# Patient Record
Sex: MALE | Race: WHITE | NOT HISPANIC OR LATINO | URBAN - METROPOLITAN AREA
[De-identification: names, ages, dates, MRNs, and addresses within clinical notes are randomized per-mention and may not be internally consistent; named-entity substitution may affect disease eponyms.]

---

## 2021-03-15 ENCOUNTER — INPATIENT (INPATIENT)
Facility: HOSPITAL | Age: 86
LOS: 3 days | Discharge: HOME | DRG: 177 | End: 2021-03-19
Attending: HOSPITALIST | Admitting: HOSPITALIST
Payer: MEDICARE

## 2021-03-15 VITALS
DIASTOLIC BLOOD PRESSURE: 78 MMHG | OXYGEN SATURATION: 95 % | SYSTOLIC BLOOD PRESSURE: 126 MMHG | WEIGHT: 184.97 LBS | RESPIRATION RATE: 16 BRPM | HEART RATE: 74 BPM | TEMPERATURE: 100 F

## 2021-03-15 DIAGNOSIS — I24.8 OTHER FORMS OF ACUTE ISCHEMIC HEART DISEASE: ICD-10-CM

## 2021-03-15 DIAGNOSIS — E11.9 TYPE 2 DIABETES MELLITUS WITHOUT COMPLICATIONS: ICD-10-CM

## 2021-03-15 DIAGNOSIS — Z29.9 ENCOUNTER FOR PROPHYLACTIC MEASURES, UNSPECIFIED: ICD-10-CM

## 2021-03-15 DIAGNOSIS — I10 ESSENTIAL (PRIMARY) HYPERTENSION: ICD-10-CM

## 2021-03-15 DIAGNOSIS — U07.1 COVID-19: ICD-10-CM

## 2021-03-15 DIAGNOSIS — R09.89 OTHER SPECIFIED SYMPTOMS AND SIGNS INVOLVING THE CIRCULATORY AND RESPIRATORY SYSTEMS: ICD-10-CM

## 2021-03-15 DIAGNOSIS — R77.8 OTHER SPECIFIED ABNORMALITIES OF PLASMA PROTEINS: ICD-10-CM

## 2021-03-15 LAB
ALBUMIN SERPL ELPH-MCNC: 3.1 G/DL — LOW (ref 3.5–5)
ALP SERPL-CCNC: 96 U/L — SIGNIFICANT CHANGE UP (ref 40–120)
ALT FLD-CCNC: 31 U/L DA — SIGNIFICANT CHANGE UP (ref 10–60)
ANION GAP SERPL CALC-SCNC: 6 MMOL/L — SIGNIFICANT CHANGE UP (ref 5–17)
APTT BLD: 28 SEC — SIGNIFICANT CHANGE UP (ref 27.5–35.5)
AST SERPL-CCNC: 26 U/L — SIGNIFICANT CHANGE UP (ref 10–40)
BASOPHILS # BLD AUTO: 0.01 K/UL — SIGNIFICANT CHANGE UP (ref 0–0.2)
BASOPHILS NFR BLD AUTO: 0.1 % — SIGNIFICANT CHANGE UP (ref 0–2)
BILIRUB SERPL-MCNC: 0.4 MG/DL — SIGNIFICANT CHANGE UP (ref 0.2–1.2)
BUN SERPL-MCNC: 27 MG/DL — HIGH (ref 7–18)
CALCIUM SERPL-MCNC: 8.4 MG/DL — SIGNIFICANT CHANGE UP (ref 8.4–10.5)
CHLORIDE SERPL-SCNC: 101 MMOL/L — SIGNIFICANT CHANGE UP (ref 96–108)
CK SERPL-CCNC: 104 U/L — SIGNIFICANT CHANGE UP (ref 35–232)
CO2 SERPL-SCNC: 31 MMOL/L — SIGNIFICANT CHANGE UP (ref 22–31)
CREAT SERPL-MCNC: 1.06 MG/DL — SIGNIFICANT CHANGE UP (ref 0.5–1.3)
D DIMER BLD IA.RAPID-MCNC: 1577 NG/ML DDU — HIGH
EOSINOPHIL # BLD AUTO: 0 K/UL — SIGNIFICANT CHANGE UP (ref 0–0.5)
EOSINOPHIL NFR BLD AUTO: 0 % — SIGNIFICANT CHANGE UP (ref 0–6)
FIBRINOGEN PPP-MCNC: 577 MG/DL — HIGH (ref 290–520)
GLUCOSE SERPL-MCNC: 127 MG/DL — HIGH (ref 70–99)
HCT VFR BLD CALC: 38.3 % — LOW (ref 39–50)
HGB BLD-MCNC: 12.8 G/DL — LOW (ref 13–17)
IMM GRANULOCYTES NFR BLD AUTO: 0.7 % — SIGNIFICANT CHANGE UP (ref 0–1.5)
INR BLD: 1.01 RATIO — SIGNIFICANT CHANGE UP (ref 0.88–1.16)
LACTATE SERPL-SCNC: 1.5 MMOL/L — SIGNIFICANT CHANGE UP (ref 0.7–2)
LDH SERPL L TO P-CCNC: 194 U/L — SIGNIFICANT CHANGE UP (ref 120–225)
LYMPHOCYTES # BLD AUTO: 0.93 K/UL — LOW (ref 1–3.3)
LYMPHOCYTES # BLD AUTO: 13.6 % — SIGNIFICANT CHANGE UP (ref 13–44)
MCHC RBC-ENTMCNC: 31.1 PG — SIGNIFICANT CHANGE UP (ref 27–34)
MCHC RBC-ENTMCNC: 33.4 GM/DL — SIGNIFICANT CHANGE UP (ref 32–36)
MCV RBC AUTO: 93 FL — SIGNIFICANT CHANGE UP (ref 80–100)
MONOCYTES # BLD AUTO: 0.98 K/UL — HIGH (ref 0–0.9)
MONOCYTES NFR BLD AUTO: 14.3 % — HIGH (ref 2–14)
NEUTROPHILS # BLD AUTO: 4.86 K/UL — SIGNIFICANT CHANGE UP (ref 1.8–7.4)
NEUTROPHILS NFR BLD AUTO: 71.3 % — SIGNIFICANT CHANGE UP (ref 43–77)
NRBC # BLD: 0 /100 WBCS — SIGNIFICANT CHANGE UP (ref 0–0)
NT-PROBNP SERPL-SCNC: 2445 PG/ML — HIGH (ref 0–450)
PLATELET # BLD AUTO: 174 K/UL — SIGNIFICANT CHANGE UP (ref 150–400)
POTASSIUM SERPL-MCNC: 3.6 MMOL/L — SIGNIFICANT CHANGE UP (ref 3.5–5.3)
POTASSIUM SERPL-SCNC: 3.6 MMOL/L — SIGNIFICANT CHANGE UP (ref 3.5–5.3)
PROT SERPL-MCNC: 6.8 G/DL — SIGNIFICANT CHANGE UP (ref 6–8.3)
PROTHROM AB SERPL-ACNC: 12 SEC — SIGNIFICANT CHANGE UP (ref 10.6–13.6)
RAPID RVP RESULT: DETECTED
RBC # BLD: 4.12 M/UL — LOW (ref 4.2–5.8)
RBC # FLD: 12.1 % — SIGNIFICANT CHANGE UP (ref 10.3–14.5)
SARS-COV-2 RNA SPEC QL NAA+PROBE: DETECTED
SODIUM SERPL-SCNC: 138 MMOL/L — SIGNIFICANT CHANGE UP (ref 135–145)
TROPONIN I SERPL-MCNC: 0.07 NG/ML — HIGH (ref 0–0.04)
WBC # BLD: 6.83 K/UL — SIGNIFICANT CHANGE UP (ref 3.8–10.5)
WBC # FLD AUTO: 6.83 K/UL — SIGNIFICANT CHANGE UP (ref 3.8–10.5)

## 2021-03-15 PROCEDURE — 99222 1ST HOSP IP/OBS MODERATE 55: CPT | Mod: CS

## 2021-03-15 PROCEDURE — 71275 CT ANGIOGRAPHY CHEST: CPT | Mod: 26

## 2021-03-15 PROCEDURE — 71045 X-RAY EXAM CHEST 1 VIEW: CPT | Mod: 26

## 2021-03-15 PROCEDURE — 99285 EMERGENCY DEPT VISIT HI MDM: CPT | Mod: CS

## 2021-03-15 RX ORDER — ALBUTEROL 90 UG/1
2 AEROSOL, METERED ORAL EVERY 4 HOURS
Refills: 0 | Status: DISCONTINUED | OUTPATIENT
Start: 2021-03-15 | End: 2021-03-17

## 2021-03-15 RX ORDER — ACETAMINOPHEN 500 MG
650 TABLET ORAL EVERY 4 HOURS
Refills: 0 | Status: DISCONTINUED | OUTPATIENT
Start: 2021-03-15 | End: 2021-03-19

## 2021-03-15 RX ORDER — GUAIFENESIN/DEXTROMETHORPHAN 600MG-30MG
10 TABLET, EXTENDED RELEASE 12 HR ORAL EVERY 4 HOURS
Refills: 0 | Status: DISCONTINUED | OUTPATIENT
Start: 2021-03-15 | End: 2021-03-18

## 2021-03-15 RX ORDER — ENOXAPARIN SODIUM 100 MG/ML
40 INJECTION SUBCUTANEOUS DAILY
Refills: 0 | Status: DISCONTINUED | OUTPATIENT
Start: 2021-03-15 | End: 2021-03-19

## 2021-03-15 RX ORDER — REMDESIVIR 5 MG/ML
200 INJECTION INTRAVENOUS EVERY 24 HOURS
Refills: 0 | Status: COMPLETED | OUTPATIENT
Start: 2021-03-15 | End: 2021-03-16

## 2021-03-15 RX ORDER — INSULIN LISPRO 100/ML
VIAL (ML) SUBCUTANEOUS
Refills: 0 | Status: DISCONTINUED | OUTPATIENT
Start: 2021-03-15 | End: 2021-03-19

## 2021-03-15 RX ORDER — ASPIRIN/CALCIUM CARB/MAGNESIUM 324 MG
325 TABLET ORAL ONCE
Refills: 0 | Status: COMPLETED | OUTPATIENT
Start: 2021-03-15 | End: 2021-03-15

## 2021-03-15 RX ORDER — SODIUM CHLORIDE 9 MG/ML
1000 INJECTION INTRAMUSCULAR; INTRAVENOUS; SUBCUTANEOUS ONCE
Refills: 0 | Status: COMPLETED | OUTPATIENT
Start: 2021-03-15 | End: 2021-03-15

## 2021-03-15 RX ORDER — REMDESIVIR 5 MG/ML
INJECTION INTRAVENOUS
Refills: 0 | Status: DISCONTINUED | OUTPATIENT
Start: 2021-03-15 | End: 2021-03-19

## 2021-03-15 RX ORDER — ASPIRIN/CALCIUM CARB/MAGNESIUM 324 MG
81 TABLET ORAL DAILY
Refills: 0 | Status: DISCONTINUED | OUTPATIENT
Start: 2021-03-15 | End: 2021-03-19

## 2021-03-15 RX ORDER — DEXAMETHASONE 0.5 MG/5ML
6 ELIXIR ORAL DAILY
Refills: 0 | Status: DISCONTINUED | OUTPATIENT
Start: 2021-03-15 | End: 2021-03-19

## 2021-03-15 RX ADMIN — SODIUM CHLORIDE 1000 MILLILITER(S): 9 INJECTION INTRAMUSCULAR; INTRAVENOUS; SUBCUTANEOUS at 16:28

## 2021-03-15 RX ADMIN — Medication 325 MILLIGRAM(S): at 17:57

## 2021-03-15 NOTE — H&P ADULT - ASSESSMENT
86 year old male with a PMH of HTN, DM who presented with a chief complaint of fever, cough and sob. Patient is admitted for AHRF 2/2 COVID pna     Primary team to confirm home medications

## 2021-03-15 NOTE — H&P ADULT - HISTORY OF PRESENT ILLNESS
86 year old male with a PMH of HTN, DM who presented with a chief complaint of fever, cough and sob. pt states his sxs started few days ago when he started having fevers, cough productive of whitish-colored sputum associated with shortness of breath. He endorses his sxs have progressively gotten worse. Denies nausea, vomiting, diarrhea, abdominal pain, chest pain, palpitations, dizziness, headache, wheezing, joint pain or swelling

## 2021-03-15 NOTE — H&P ADULT - ATTENDING COMMENTS
Patient is an 86 year old male with a PMH of HTN, DM who presented with a chief complaint of fever.  (Spanish  ID: [id number])  Patient states that beginning several days prior to current presentation he began to experience intermittent fevers as well as a progressively worsening cough productive of whitish-colored sputum associated with shortness of breath, particularly with exertion.    At time of examination in the ED, patient denies any headache, dizziness, chest pain, palpitations, abdominal pain, nausea/vomiting/diarrhea.    T(C): 37.7 (03-15-21 @ 14:04), Max: 37.7 (03-15-21 @ 14:04)  T(F): 99.8 (03-15-21 @ 14:04), Max: 99.8 (03-15-21 @ 14:04)  HR: 64 (03-15-21 @ 17:12) (64 - 74)  BP: 106/53 (03-15-21 @ 17:12) (106/53 - 126/78)  RR: 19 (03-15-21 @ 17:12) (16 - 19)  SpO2: 93% (03-15-21 @ 17:12) (93% - 95%)  Wt(kg): --    P/E: As above MAR    A/P:    Cough, Fever and Shortness of Breath most probably due to Severe COVID Infection:  -COVID testing still pending in ED  -SIRS Criteria=  0 + possible source of infection (?Lungs)  -At time of examination in the ED, patient is requiring supplemental oxygen.  Therefore, should COVID testing return as detected, patient can be categorized as Severe Disease.  -Therefore, will start patient on Lovenox  -In addition, will start patient on Dexamethasone 6mg IV Daily for 10 days or until discharge (whichever is shorter)  -Will also start patient on Albuterol MDI Q2H PRN  -Will send ESR, CRP, Procalcitonin  -Will continue to trend d-dimer, CRP, LDH, Troponin, Ferritin, CPK  -Tylenol PRN for fever  -Robitussin for Cough  -Will continue to provide patient with any and all additional supportive care as necessary  -Will ask Infectious Disease to evaluate patient for further recommendations, such as Remdesivir    Troponinemia likely due to Type 2 MI:  -Troponin= 0.066 (No baseline available)  -Will admit patient to Telemetry for continuous cardiac monitoring  -Will continue to trend troponin with simultaneous acquisition of EKG  -TTE    Elevated Pro-BNP:  -As above  -Pro-BNP= 2,445 (No baseline available)    Normocytic Anemia:  -Hgb= 12.8  -MCV= 93.0, RDW= 12.1  -Will send Retic Count, Thiamine, Folate, B12, Iron Panel (Iron, TIBC, Ferritin)    HTN:  -Will resume patient's home medication  -Vital Signs Q4H    DM:  -Hemoglobin A1c with AM labs  -Blood Glucose Monitoring ACHS  -Regular Insulin Sliding Scale ACHS  -Carb Controlled, Heart Healthy diet as tolerated    Hypoalbuminemia:  -Nutrition Consult    GI/DVT PPx:  -Pepcid  -Lovenox Patient is an 86 year old male with a PMH of HTN, DM who presented with a chief complaint of fever.  (Tanzanian  ID: [id number])  Patient states that beginning several days prior to current presentation he began to experience intermittent fevers as well as a progressively worsening cough productive of whitish-colored sputum associated with shortness of breath, particularly with exertion.    At time of examination in the ED, patient denies any headache, dizziness, chest pain, palpitations, abdominal pain, nausea/vomiting/diarrhea.    T(C): 37.7 (03-15-21 @ 14:04), Max: 37.7 (03-15-21 @ 14:04)  T(F): 99.8 (03-15-21 @ 14:04), Max: 99.8 (03-15-21 @ 14:04)  HR: 64 (03-15-21 @ 17:12) (64 - 74)  BP: 106/53 (03-15-21 @ 17:12) (106/53 - 126/78)  RR: 19 (03-15-21 @ 17:12) (16 - 19)  SpO2: 93% (03-15-21 @ 17:12) (93% - 95%)  Wt(kg): --    P/E: As above MAR    A/P:    Cough, Fever and Shortness of Breath most probably due to Severe COVID Infection:  -COVID testing still pending in ED  -SIRS Criteria=  0 + possible source of infection (?Lungs)  -At time of examination in the ED, patient is requiring supplemental oxygen.  Therefore, should COVID testing return as detected, patient can be categorized as Severe Disease.  -Therefore, will start patient on Lovenox  -In addition, will start patient on Dexamethasone 6mg IV Daily for 10 days or until discharge (whichever is shorter)  -Will also start patient on Albuterol MDI Q2H PRN  -Will send ESR, CRP, Procalcitonin  -Will continue to trend d-dimer, CRP, LDH, Troponin, Ferritin, CPK  -Tylenol PRN for fever  -Robitussin for Cough  -Will continue to provide patient with any and all additional supportive care as necessary  -Will ask Infectious Disease to evaluate patient for further recommendations, such as Remdesivir    Troponinemia likely due to Type 2 MI:  -Troponin= 0.066 (No baseline available)  -Will admit patient to Telemetry for continuous cardiac monitoring  -Will continue to trend troponin with simultaneous acquisition of EKG  -TTE    Elevated Pro-BNP:  -As above  -Pro-BNP= 2,445 (No baseline available)    Normocytic Anemia:  -Hgb= 12.8  -MCV= 93.0, RDW= 12.1  -Will send Retic Count, Thiamine, Folate, B12, Iron Panel (Iron, TIBC, Ferritin)    HTN:  -Will resume patient's home medication  -Vital Signs Q4H    DM:  -Hemoglobin A1c with AM labs  -Blood Glucose Monitoring ACHS  -Regular Insulin Sliding Scale ACHS  -Carb Controlled, Heart Healthy, Low Sodium diet as tolerated    Hypoalbuminemia:  -Nutrition Consult    GI/DVT PPx:  -Pepcid  -Lovenox Patient is an 86 year old male with a PMH of HTN, DM who presented with a chief complaint of fever.  (Gibraltarian  ID: [id number])  Patient states that beginning several days prior to current presentation he began to experience intermittent fevers as well as a progressively worsening cough productive of whitish-colored sputum associated with shortness of breath, particularly with exertion.    At time of examination in the ED, patient denies any headache, dizziness, chest pain, palpitations, abdominal pain, nausea/vomiting/diarrhea.    T(C): 37.7 (03-15-21 @ 14:04), Max: 37.7 (03-15-21 @ 14:04)  T(F): 99.8 (03-15-21 @ 14:04), Max: 99.8 (03-15-21 @ 14:04)  HR: 64 (03-15-21 @ 17:12) (64 - 74)  BP: 106/53 (03-15-21 @ 17:12) (106/53 - 126/78)  RR: 19 (03-15-21 @ 17:12) (16 - 19)  SpO2: 93% (03-15-21 @ 17:12) (93% - 95%)  Wt(kg): --    P/E: As above MAR    A/P:    Cough, Fever and Shortness of Breath most probably due to Severe COVID Infection vs Community Acquired Pneumonia (CAP):  -COVID testing still pending in ED  -SIRS Criteria=  0 + possible source of infection (?Lungs)  -Chest film, though portable, appears to demonstrate a possible small consolidated density in the left lower lung field potentially suggestive of a bacterial pneumonia  -At time of examination in the ED, patient is requiring supplemental oxygen.  Therefore, should COVID testing return as detected, patient can be categorized as Severe Disease.  -Therefore, will start patient on Lovenox  -In addition, will start patient on Dexamethasone 6mg IV Daily for 10 days or until discharge (whichever is shorter)  -Will also start patient on Albuterol MDI Q2H PRN  -Will send ESR, CRP, Procalcitonin STAT  -Will continue to trend d-dimer, CRP, LDH, Troponin, Ferritin, CPK  -Tylenol PRN for fever  -Robitussin for Cough  -Will empirically start patient on Rocephin and Azithro, for now  -Will continue to provide patient with any and all additional supportive care as necessary  -Will ask Infectious Disease to evaluate patient for further recommendations, such as Remdesivir    Troponinemia likely due to Type 2 MI:  -Troponin= 0.066 (No baseline available)  -Will admit patient to Telemetry for continuous cardiac monitoring  -Will continue to trend troponin with simultaneous acquisition of EKG  -TTE    Elevated Pro-BNP:  -As above  -Pro-BNP= 2,445 (No baseline available)    Normocytic Anemia:  -Hgb= 12.8  -MCV= 93.0, RDW= 12.1  -Will send Retic Count, Thiamine, Folate, B12, Iron Panel (Iron, TIBC, Ferritin)    HTN:  -Will resume patient's home medication  -Vital Signs Q4H    DM:  -Hemoglobin A1c with AM labs  -Blood Glucose Monitoring ACHS  -Regular Insulin Sliding Scale ACHS  -Carb Controlled, Heart Healthy, Low Sodium diet as tolerated    Hypoalbuminemia:  -Nutrition Consult    GI/DVT PPx:  -Pepcid  -Lovenox Patient is an 86 year old male with a PMH of HTN, DM who presented with a chief complaint of fever.  (Japanese  ID: [id number])  Patient states that beginning several days prior to current presentation he began to experience intermittent fevers as well as a progressively worsening cough productive of whitish-colored sputum associated with shortness of breath, particularly with exertion.    At time of examination in the ED, patient denies any headache, dizziness, chest pain, palpitations, abdominal pain, nausea/vomiting/diarrhea.    T(C): 37.7 (03-15-21 @ 14:04), Max: 37.7 (03-15-21 @ 14:04)  T(F): 99.8 (03-15-21 @ 14:04), Max: 99.8 (03-15-21 @ 14:04)  HR: 64 (03-15-21 @ 17:12) (64 - 74)  BP: 106/53 (03-15-21 @ 17:12) (106/53 - 126/78)  RR: 19 (03-15-21 @ 17:12) (16 - 19)  SpO2: 93% (03-15-21 @ 17:12) (93% - 95%)  Wt(kg): --    P/E: As above MAR    A/P:    Cough, Fever and Shortness of Breath most probably due to Severe COVID Infection:  -COVID testing still pending in ED  -SIRS Criteria=  0 + possible source of infection (?Lungs)  -Chest film, though portable, appears to demonstrate a possible small consolidated density in the left lower lung field  -At time of examination in the ED, patient is requiring supplemental oxygen.  Therefore, should COVID testing return as detected, patient can be categorized as Severe Disease.  -Therefore, will start patient on Lovenox  -In addition, will start patient on Dexamethasone 6mg IV Daily for 10 days or until discharge (whichever is shorter)  -Will also start patient on Albuterol MDI Q2H PRN  -Will send ESR, CRP, Procalcitonin STAT  -Will continue to trend d-dimer, CRP, LDH, Troponin, Ferritin, CPK  -Tylenol PRN for fever  -Robitussin for Cough  -Will continue to provide patient with any and all additional supportive care as necessary  -Will ask Infectious Disease to evaluate patient for further recommendations, such as Remdesivir    Troponinemia likely due to Type 2 MI:  -Troponin= 0.066 (No baseline available)  -Will admit patient to Telemetry for continuous cardiac monitoring  -Will continue to trend troponin with simultaneous acquisition of EKG  -TTE    Elevated Pro-BNP:  -As above  -Pro-BNP= 2,445 (No baseline available)    Normocytic Anemia:  -Hgb= 12.8  -MCV= 93.0, RDW= 12.1  -Will send Retic Count, Thiamine, Folate, B12, Iron Panel (Iron, TIBC, Ferritin)    HTN:  -Will resume patient's home medication  -Vital Signs Q4H    DM:  -Hemoglobin A1c with AM labs  -Blood Glucose Monitoring ACHS  -Regular Insulin Sliding Scale ACHS  -Carb Controlled, Heart Healthy, Low Sodium diet as tolerated    Hypoalbuminemia:  -Nutrition Consult    GI/DVT PPx:  -Pepcid  -Lovenox

## 2021-03-15 NOTE — ED PROVIDER NOTE - CLINICAL SUMMARY MEDICAL DECISION MAKING FREE TEXT BOX
No e/o fluid overload. No e/o DVT and no CP/SOB to suggest PE. No e/o fluid overload. No e/o DVT and no CP/SOB to suggest PE. Noted elevated trop and will admit. No focal PNA noted and concern for COVID and will hold off on abx at this time. Patient well appearing, hemodynamically stable, satting well on RA. Given ASA. D/w his girlfriend. Admitted to internal medicine for further monitoring, w/u, and care. No e/o fluid overload. No e/o DVT and no CP/SOB to suggest PE - noted elevated trop/BNP and will send for CTA, low suspicion for this and MAR Dr. Dobbs will f/u results. Noted elevated trop and will admit. No focal PNA noted and concern for COVID and will hold off on abx at this time. Patient well appearing, hemodynamically stable, satting well on RA. Given ASA. Satting well on 2L NC. D/w his girlfriend. Admitted to internal medicine for further monitoring, w/u, and care. No e/o fluid overload. No e/o DVT and no CP/SOB to suggest PE and CTA negative. Noted elevated trop and will admit. No focal PNA noted and concern for COVID and will hold off on abx at this time - COVID confirmed. Patient well appearing, hemodynamically stable, satting well on 2L NC. Given ASA. Satting well on 2L NC. D/w his girlfriend. Admitted to internal medicine for further monitoring, w/u, and care.

## 2021-03-15 NOTE — H&P ADULT - PROBLEM SELECTOR PLAN 2
-Pt noted to have troponin of .06  -EKG no acute changes  -d dimer >1500   -CTA negative for PE   -Will trend troponins   -will monitor on tele and order echo for now

## 2021-03-15 NOTE — H&P ADULT - PROBLEM SELECTOR PLAN 3
-Patient takes at home: Metformin  -Hold oral hypoglycemics  -HSS  -Accucheck: Before meals and at bedtime

## 2021-03-15 NOTE — ED ADULT NURSE NOTE - NSIMPLEMENTINTERV_GEN_ALL_ED
Implemented All Fall with Harm Risk Interventions:  [place] to call system. Call bell, personal items and telephone within reach. Instruct patient to call for assistance. Room bathroom lighting operational. Non-slip footwear when patient is off stretcher. Physically safe environment: no spills, clutter or unnecessary equipment. Stretcher in lowest position, wheels locked, appropriate side rails in place. Provide visual cue, wrist band, yellow gown, etc. Monitor gait and stability. Monitor for mental status changes and reorient to person, place, and time. Review medications for side effects contributing to fall risk. Reinforce activity limits and safety measures with patient and family. Provide visual clues: red socks.

## 2021-03-15 NOTE — H&P ADULT - NSHPPHYSICALEXAM_GEN_ALL_CORE
Vital Signs Last 24 Hrs  T(C): 37.7 (15 Mar 2021 14:04), Max: 37.7 (15 Mar 2021 14:04)  T(F): 99.8 (15 Mar 2021 14:04), Max: 99.8 (15 Mar 2021 14:04)  HR: 64 (15 Mar 2021 17:12) (64 - 74)  BP: 106/53 (15 Mar 2021 17:12) (106/53 - 126/78)  BP(mean): --  RR: 19 (15 Mar 2021 17:12) (16 - 19)  SpO2: 93% (15 Mar 2021 17:12) (93% - 95%)    GENERAL: NAD, on 2l nc  HEAD:  Atraumatic, Normocephalic  EYES: EOMI, PERRLA, conjunctiva and sclera clear  ENT: Moist mucous membranes  NECK: Supple, No JVD  CHEST/LUNG: RLL mild crackles   HEART: Regular rate and rhythm; S1+ S2+  ABDOMEN: Bowel sounds present; Soft, Nontender, Nondistended. No hepatomegaly  EXTREMITIES:  2+ Peripheral Pulses, brisk capillary refill. No clubbing, cyanosis, 2+ edema  NERVOUS SYSTEM:  Alert & Oriented , speech clear. No deficits   MSK: FROM all 4 extremities, full and equal strength  SKIN: No rashes or lesions

## 2021-03-15 NOTE — ED PROVIDER NOTE - PHYSICAL EXAMINATION
Afebrile, hemodynamically stable, saturating well on RA  NAD, well appearing, sitting and walking comfortably in bed, no WOB, speaking full sentences  Head NCAT  EOMI grossly, anicteric  MMM  No JVD  RRR, nml S1/S2, no m/r/g  Lungs CTAB, no w/r/r  Abd soft, NT, ND, nml BS, no rebound or guarding  AAO, CN's 3-12 grossly intact  JO spontaneously, no leg cyanosis or edema  Skin warm, well perfused, no rashes or hives

## 2021-03-15 NOTE — H&P ADULT - NSHPLABSRESULTS_GEN_ALL_CORE
< from: CT Angio Chest w/ IV Cont (03.15.21 @ 22:27) >      EXAM:  CT ANGIO CHEST (W)AW IC                            PROCEDURE DATE:  03/15/2021          INTERPRETATION:  CLINICAL INFORMATION: Cough, shortness of breath, concern for PE, history of heart disease    COMPARISON: None.    CONTRAST/COMPLICATIONS:  IV Contrast: Omnipaque 350 / 47 cc administered / 53 cc discarded.  Oral Contrast: NONE  Complications: None reported at time of study completion    PROCEDURE:  CT Angiography of the Chest.  Sagittal and coronal reformats were performed as well as3D (MIP) reconstructions.    FINDINGS:    LUNGS AND AIRWAYS: Patent central airways.  Streaky bilateral subsegmental atelectasis, worst in the lung bases. No focal lung consolidation.  PLEURA: No pleural effusion.  MEDIASTINUM AND SALLY: No lymphadenopathy.  VESSELS: No evidence of pulmonary embolism, though multiple segmental pulmonary arteries, particularly at the lung bases, are not well evaluated due to motion artifact.  HEART: Heart size is normal. No pericardial effusion. Coronary artery calcifications.  CHEST WALL AND LOWER NECK: Within normal limits.  VISUALIZED UPPER ABDOMEN: Within normal limits.  BONES: Degenerative changes.    IMPRESSION:  No CT evidence of pulmonary embolism, though multiple segmental pulmonary arteries, particularly at the lung bases, are not well evaluated due to motion artifact.    No pneumonia.            MARCEL WOO MD; Attending Radiologist  This document has been electronically signed. Mar 15 2021 10:51PM    < end of copied text >    < from: Xray Chest 1 View-PORTABLE IMMEDIATE (03.15.21 @ 16:33) >    IMPRESSION:  Heart magnified by AP film shallow inspiration. Unfolding calcification thoracic aorta. Bibasilar fibrotic/atelectatic stranding. No focal consolidation. Trace right pleural effusion. No acute infiltrate or pleural effusion.          < end of copied text >

## 2021-03-15 NOTE — H&P ADULT - PROBLEM SELECTOR PLAN 5
IMPROVE VTE Individual Risk Assessment  RISK                                                         Points  [  ] Previous VTE                                      3  [  ] Thrombophilia                                   2  [  ] Lower limb paralysis                         2 (unable to hold up >15 seconds)    [  ] Current Cancer                                  2       (within 6 months)  [  ] Immobilization > 24 hrs                    1  [  ] ICU/CCU stay > 24 hrs                         1  [  ] Age > 60                                              1  lovenox for dvt ppx

## 2021-03-15 NOTE — ED PROVIDER NOTE - OBJECTIVE STATEMENT
86yoM with h/o HTN, DM, presents with mucous productive cough x2-3 wks. Is unsure if he has fever. States lives alone and cares for himself and is eating normally and taking care of himself appropriately. No recent COVID swab, unknown if exposed. Denies CP, SOB, leg pain or swelling, abd pain, v/d, dysuria, rashes, and all other symptoms.

## 2021-03-15 NOTE — H&P ADULT - PROBLEM SELECTOR PLAN 1
Cough, Fever and Shortness of Breath most probably due to Severe COVID Infection vs Community Acquired Pneumonia (CAP):  -COVID +  -SIRS Criteria=  0 + possible source of infection ( Lungs)  -Chest No CT evidence of pulmonary embolism, though multiple segmental pulmonary arteries, particularly at the lung bases, are not well evaluated due to motion artifact.  -CXR Heart magnified by AP film shallow inspiration. Unfolding calcification thoracic aorta. Bibasilar fibrotic/atelectatic stranding. No focal consolidation. Trace right pleural effusion. No acute infiltrate or pleural effusion.  -will start patient on Dexamethasone 6mg IV Daily  -Will also start patient on Albuterol MDI PRN  -Will send ESR, CRP, Procalcitonin  -Will continue to trend d-dimer, CRP, LDH, Troponin, Ferritin, CPK  -Tylenol PRN for fever, Robitussin for Cough  -Will start on Remdesivir, dc if antibodies are positive

## 2021-03-15 NOTE — ED PROVIDER NOTE - CARE PLAN
Principal Discharge DX:	Demand ischemia of myocardium  Secondary Diagnosis:	Cough   Principal Discharge DX:	Demand ischemia of myocardium  Secondary Diagnosis:	Cough  Secondary Diagnosis:	COVID-19 virus infection

## 2021-03-16 DIAGNOSIS — I24.8 OTHER FORMS OF ACUTE ISCHEMIC HEART DISEASE: ICD-10-CM

## 2021-03-16 LAB
A1C WITH ESTIMATED AVERAGE GLUCOSE RESULT: 6.7 % — HIGH (ref 4–5.6)
ANION GAP SERPL CALC-SCNC: 7 MMOL/L — SIGNIFICANT CHANGE UP (ref 5–17)
APTT BLD: 29.1 SEC — SIGNIFICANT CHANGE UP (ref 27.5–35.5)
BUN SERPL-MCNC: 25 MG/DL — HIGH (ref 7–18)
CALCIUM SERPL-MCNC: 8.1 MG/DL — LOW (ref 8.4–10.5)
CHLORIDE SERPL-SCNC: 97 MMOL/L — SIGNIFICANT CHANGE UP (ref 96–108)
CHOLEST SERPL-MCNC: 156 MG/DL — SIGNIFICANT CHANGE UP
CK MB BLD-MCNC: 1.3 % — SIGNIFICANT CHANGE UP (ref 0–3.5)
CK MB CFR SERPL CALC: 1.4 NG/ML — SIGNIFICANT CHANGE UP (ref 0–3.6)
CK SERPL-CCNC: 110 U/L — SIGNIFICANT CHANGE UP (ref 35–232)
CK SERPL-CCNC: 114 U/L — SIGNIFICANT CHANGE UP (ref 35–232)
CO2 SERPL-SCNC: 33 MMOL/L — HIGH (ref 22–31)
CREAT SERPL-MCNC: 0.85 MG/DL — SIGNIFICANT CHANGE UP (ref 0.5–1.3)
CRP SERPL-MCNC: 59 MG/L — HIGH
CRP SERPL-MCNC: 90 MG/L — HIGH
D DIMER BLD IA.RAPID-MCNC: 971 NG/ML DDU — HIGH
ERYTHROCYTE [SEDIMENTATION RATE] IN BLOOD: 18 MM/HR — SIGNIFICANT CHANGE UP (ref 0–20)
ESTIMATED AVERAGE GLUCOSE: 146 MG/DL — HIGH (ref 68–114)
FERRITIN SERPL-MCNC: 208 NG/ML — SIGNIFICANT CHANGE UP (ref 30–400)
FERRITIN SERPL-MCNC: 274 NG/ML — SIGNIFICANT CHANGE UP (ref 30–400)
FOLATE SERPL-MCNC: 14.2 NG/ML — SIGNIFICANT CHANGE UP
GLUCOSE BLDC GLUCOMTR-MCNC: 189 MG/DL — HIGH (ref 70–99)
GLUCOSE BLDC GLUCOMTR-MCNC: 192 MG/DL — HIGH (ref 70–99)
GLUCOSE BLDC GLUCOMTR-MCNC: 204 MG/DL — HIGH (ref 70–99)
GLUCOSE BLDC GLUCOMTR-MCNC: 204 MG/DL — HIGH (ref 70–99)
GLUCOSE BLDC GLUCOMTR-MCNC: 218 MG/DL — HIGH (ref 70–99)
GLUCOSE SERPL-MCNC: 157 MG/DL — HIGH (ref 70–99)
HCT VFR BLD CALC: 36.4 % — LOW (ref 39–50)
HDLC SERPL-MCNC: 53 MG/DL — SIGNIFICANT CHANGE UP
HGB BLD-MCNC: 12.1 G/DL — LOW (ref 13–17)
INR BLD: 1.01 RATIO — SIGNIFICANT CHANGE UP (ref 0.88–1.16)
IRON SATN MFR SERPL: 14 UG/DL — LOW (ref 65–170)
IRON SATN MFR SERPL: 6 % — LOW (ref 20–55)
LDH SERPL L TO P-CCNC: 176 U/L — SIGNIFICANT CHANGE UP (ref 120–225)
LIPID PNL WITH DIRECT LDL SERPL: 83 MG/DL — SIGNIFICANT CHANGE UP
MAGNESIUM SERPL-MCNC: 2 MG/DL — SIGNIFICANT CHANGE UP (ref 1.6–2.6)
MCHC RBC-ENTMCNC: 31.3 PG — SIGNIFICANT CHANGE UP (ref 27–34)
MCHC RBC-ENTMCNC: 33.2 GM/DL — SIGNIFICANT CHANGE UP (ref 32–36)
MCV RBC AUTO: 94.1 FL — SIGNIFICANT CHANGE UP (ref 80–100)
NON HDL CHOLESTEROL: 103 MG/DL — SIGNIFICANT CHANGE UP
NRBC # BLD: 0 /100 WBCS — SIGNIFICANT CHANGE UP (ref 0–0)
NT-PROBNP SERPL-SCNC: 746 PG/ML — HIGH (ref 0–450)
PHOSPHATE SERPL-MCNC: 3 MG/DL — SIGNIFICANT CHANGE UP (ref 2.5–4.5)
PLATELET # BLD AUTO: 150 K/UL — SIGNIFICANT CHANGE UP (ref 150–400)
POTASSIUM SERPL-MCNC: 3.4 MMOL/L — LOW (ref 3.5–5.3)
POTASSIUM SERPL-SCNC: 3.4 MMOL/L — LOW (ref 3.5–5.3)
PROCALCITONIN SERPL-MCNC: 0.16 NG/ML — HIGH (ref 0.02–0.1)
PROCALCITONIN SERPL-MCNC: 0.23 NG/ML — HIGH (ref 0.02–0.1)
PROTHROM AB SERPL-ACNC: 12 SEC — SIGNIFICANT CHANGE UP (ref 10.6–13.6)
RBC # BLD: 3.87 M/UL — LOW (ref 4.2–5.8)
RBC # FLD: 12.4 % — SIGNIFICANT CHANGE UP (ref 10.3–14.5)
SARS-COV-2 IGG SERPL QL IA: NEGATIVE — SIGNIFICANT CHANGE UP
SARS-COV-2 IGM SERPL IA-ACNC: 0.11 INDEX — SIGNIFICANT CHANGE UP
SODIUM SERPL-SCNC: 137 MMOL/L — SIGNIFICANT CHANGE UP (ref 135–145)
TIBC SERPL-MCNC: 226 UG/DL — LOW (ref 250–450)
TRIGL SERPL-MCNC: 100 MG/DL — SIGNIFICANT CHANGE UP
TROPONIN I SERPL-MCNC: 0.05 NG/ML — HIGH (ref 0–0.04)
TROPONIN I SERPL-MCNC: 0.06 NG/ML — HIGH (ref 0–0.04)
TROPONIN I SERPL-MCNC: 0.06 NG/ML — HIGH (ref 0–0.04)
TSH SERPL-MCNC: 1.1 UU/ML — SIGNIFICANT CHANGE UP (ref 0.34–4.82)
UIBC SERPL-MCNC: 212 UG/DL — SIGNIFICANT CHANGE UP (ref 110–370)
VIT B12 SERPL-MCNC: 1527 PG/ML — HIGH (ref 232–1245)
WBC # BLD: 5.88 K/UL — SIGNIFICANT CHANGE UP (ref 3.8–10.5)
WBC # FLD AUTO: 5.88 K/UL — SIGNIFICANT CHANGE UP (ref 3.8–10.5)

## 2021-03-16 PROCEDURE — 99233 SBSQ HOSP IP/OBS HIGH 50: CPT | Mod: CS,GC

## 2021-03-16 RX ORDER — FERROUS SULFATE 325(65) MG
325 TABLET ORAL DAILY
Refills: 0 | Status: DISCONTINUED | OUTPATIENT
Start: 2021-03-16 | End: 2021-03-19

## 2021-03-16 RX ORDER — PANTOPRAZOLE SODIUM 20 MG/1
40 TABLET, DELAYED RELEASE ORAL
Refills: 0 | Status: DISCONTINUED | OUTPATIENT
Start: 2021-03-16 | End: 2021-03-19

## 2021-03-16 RX ORDER — SIMVASTATIN 20 MG/1
20 TABLET, FILM COATED ORAL AT BEDTIME
Refills: 0 | Status: DISCONTINUED | OUTPATIENT
Start: 2021-03-16 | End: 2021-03-19

## 2021-03-16 RX ORDER — METOPROLOL TARTRATE 50 MG
12.5 TABLET ORAL
Refills: 0 | Status: DISCONTINUED | OUTPATIENT
Start: 2021-03-16 | End: 2021-03-17

## 2021-03-16 RX ORDER — REMDESIVIR 5 MG/ML
100 INJECTION INTRAVENOUS EVERY 24 HOURS
Refills: 0 | Status: DISCONTINUED | OUTPATIENT
Start: 2021-03-17 | End: 2021-03-19

## 2021-03-16 RX ORDER — MIRTAZAPINE 45 MG/1
15 TABLET, ORALLY DISINTEGRATING ORAL AT BEDTIME
Refills: 0 | Status: DISCONTINUED | OUTPATIENT
Start: 2021-03-16 | End: 2021-03-19

## 2021-03-16 RX ORDER — POTASSIUM CHLORIDE 20 MEQ
20 PACKET (EA) ORAL ONCE
Refills: 0 | Status: COMPLETED | OUTPATIENT
Start: 2021-03-16 | End: 2021-03-16

## 2021-03-16 RX ADMIN — Medication 2: at 16:59

## 2021-03-16 RX ADMIN — SIMVASTATIN 20 MILLIGRAM(S): 20 TABLET, FILM COATED ORAL at 21:09

## 2021-03-16 RX ADMIN — Medication 1: at 21:09

## 2021-03-16 RX ADMIN — ENOXAPARIN SODIUM 40 MILLIGRAM(S): 100 INJECTION SUBCUTANEOUS at 11:54

## 2021-03-16 RX ADMIN — Medication 2: at 08:13

## 2021-03-16 RX ADMIN — Medication 20 MILLIEQUIVALENT(S): at 09:57

## 2021-03-16 RX ADMIN — MIRTAZAPINE 15 MILLIGRAM(S): 45 TABLET, ORALLY DISINTEGRATING ORAL at 21:09

## 2021-03-16 RX ADMIN — Medication 81 MILLIGRAM(S): at 11:55

## 2021-03-16 RX ADMIN — Medication 12.5 MILLIGRAM(S): at 17:19

## 2021-03-16 RX ADMIN — REMDESIVIR 500 MILLIGRAM(S): 5 INJECTION INTRAVENOUS at 02:57

## 2021-03-16 RX ADMIN — Medication 6 MILLIGRAM(S): at 02:57

## 2021-03-16 RX ADMIN — Medication 1: at 13:10

## 2021-03-16 NOTE — PATIENT PROFILE ADULT - STATED REASON FOR ADMISSION
"He has been coughing for a year so I didn't think it was Covid He has fever and cough for 3 days now".

## 2021-03-16 NOTE — PROGRESS NOTE ADULT - PROBLEM SELECTOR PLAN 1
c/w Remdesivir, Dexamethasone , Day 2  Continue with albuterol, antitussive  Supplemental oxygen to keep SpO2 92% and above. Wean as tolerated.  Monitor oxygenation  Airborne/contact isolation

## 2021-03-16 NOTE — PROGRESS NOTE ADULT - SUBJECTIVE AND OBJECTIVE BOX
NP Note discussed with  Primary Attending    Patient is a 86y old  Male who presents with a chief complaint of cough fever (15 Mar 2021 22:07)    HPI    86 year old male with a PMH of HTN, DM who presented with a chief complaint of fever, cough and sob. Pt states his symptoms started few days ago when he started having fevers, cough productive of whitish-colored sputum associated with shortness of breath. He endorses his symptoms  have progressively gotten worse. Denies nausea, vomiting, diarrhea, abdominal pain, chest pain, palpitations, dizziness, headache, wheezing, joint pain or swelling.  Pt found to have COVID 19 PCR detected. Remdesivir /Dexamethasone initiated, now Day 2. Oxygenating 98% on O2 3L NC.     INTERVAL HPI/OVERNIGHT EVENTS: no new complaints. Pt seen and examined this morning. Pt Croatian speaking. # [id number]. Pt reports "feeling good, no SOB, no chest discomfort, no cough, no fever..." Pt endorses chronic swelling in bilat low extremities for many years, not worse.      MEDICATIONS  (STANDING):  aspirin  chewable 81 milliGRAM(s) Oral daily  dexAMETHasone  Injectable 6 milliGRAM(s) IV Push daily  enoxaparin Injectable 40 milliGRAM(s) SubCutaneous daily  insulin lispro (ADMELOG) corrective regimen sliding scale   SubCutaneous Before meals and at bedtime  remdesivir  IVPB   IV Intermittent     MEDICATIONS  (PRN):  acetaminophen   Tablet .. 650 milliGRAM(s) Oral every 4 hours PRN Temp greater or equal to 38.5C (101.3F)  ALBUTerol    90 MICROgram(s) HFA Inhaler 2 Puff(s) Inhalation every 4 hours PRN Shortness of Breath and/or Wheezing  benzonatate 100 milliGRAM(s) Oral three times a day PRN Cough  guaifenesin/dextromethorphan  Syrup 10 milliLiter(s) Oral every 4 hours PRN Cough      __________________________________________________  REVIEW OF SYSTEMS:    CONSTITUTIONAL: No fever,   EYES: no acute visual disturbances  NECK: No pain or stiffness  RESPIRATORY: No cough; No shortness of breath  CARDIOVASCULAR: No chest pain, no palpitations  GASTROINTESTINAL: No pain. No nausea or vomiting; No diarrhea   NEUROLOGICAL: No headache or numbness, no tremors  MUSCULOSKELETAL: No joint pain, no muscle pain  GENITOURINARY: no dysuria, no frequency, no hesitancy  PSYCHIATRY: no depression , no anxiety  ALL OTHER  ROS negative        Vital Signs Last 24 Hrs  T(C): 36.7 (16 Mar 2021 13:52), Max: 37.2 (16 Mar 2021 02:00)  T(F): 98 (16 Mar 2021 13:52), Max: 98.9 (16 Mar 2021 02:00)  HR: 85 (16 Mar 2021 13:52) (54 - 98)  BP: 127/80 (16 Mar 2021 13:52) (104/69 - 129/59)  BP(mean): --  RR: 18 (16 Mar 2021 13:52) (18 - 19)  SpO2: 96% (16 Mar 2021 13:52) (93% - 99%)    ________________________________________________  PHYSICAL EXAM:  GENERAL: NAD  HEENT: Normocephalic;  conjunctivae and sclerae clear; moist mucous membranes;   NECK : supple  CHEST/LUNG: Clear to auscultation bilaterally with good air entry   HEART: S1 S2  regular; no murmurs, gallops or rubs  ABDOMEN: Soft, Nontender, Nondistended; Bowel sounds present  EXTREMITIES: Trace edema BLE.  calf tenderness  SKIN: warm and dry; no rash  NERVOUS SYSTEM:  Awake and alert; Oriented  to place, person and time ; no new deficits    _________________________________________________  LABS:                        12.1   5.88  )-----------( 150      ( 16 Mar 2021 06:03 )             36.4     03-16    137  |  97  |  25<H>  ----------------------------<  157<H>  3.4<L>   |  33<H>  |  0.85    Ca    8.1<L>      16 Mar 2021 06:03  Phos  3.0     03-16  Mg     2.0     03-16    TPro  6.8  /  Alb  3.1<L>  /  TBili  0.4  /  DBili  x   /  AST  26  /  ALT  31  /  AlkPhos  96  03-15    PT/INR - ( 16 Mar 2021 06:03 )   PT: 12.0 sec;   INR: 1.01 ratio         PTT - ( 16 Mar 2021 06:03 )  PTT:29.1 sec    CAPILLARY BLOOD GLUCOSE      POCT Blood Glucose.: 189 mg/dL (16 Mar 2021 13:02)  POCT Blood Glucose.: 218 mg/dL (16 Mar 2021 10:37)  POCT Blood Glucose.: 204 mg/dL (16 Mar 2021 07:48)    ---------------  `SARS-CoV-2: Detected (15 Mar 2021 16:27)      RADIOLOGY & ADDITIONAL TESTS:    ra< from: CT Angio Chest w/ IV Cont (03.15.21 @ 22:27) >  IMPRESSION:  No CT evidence of pulmonary embolism, though multiple segmental pulmonary arteries, particularly at the lung bases, are not well evaluated due to motion artifact.    No pneumonia.    < end of copied text >  < from: Xray Chest 1 View-PORTABLE IMMEDIATE (03.15.21 @ 16:33) >    IMPRESSION:  Heart magnified by AP film shallow inspiration. Unfolding calcificationthoracic aorta. Bibasilar fibrotic/atelectatic stranding. No focal consolidation. Trace right pleural effusion. No acute infiltrate or pleural effusion.    < end of copied text >      Consultant(s) Notes Reviewed:   YES/ No    Care Discussed with Consultants :     Plan of care was discussed with patient and /or primary care giver; all questions and concerns were addressed and care was aligned with patient's wishes.

## 2021-03-16 NOTE — PROGRESS NOTE ADULT - PROBLEM SELECTOR PLAN 2
Currently BP controlled without anti-HTN meds   c/w ASA  Will verify meds at his pharmacy [facility] [phone]

## 2021-03-17 LAB
ALBUMIN SERPL ELPH-MCNC: 2.6 G/DL — LOW (ref 3.5–5)
ALP SERPL-CCNC: 76 U/L — SIGNIFICANT CHANGE UP (ref 40–120)
ALT FLD-CCNC: 28 U/L DA — SIGNIFICANT CHANGE UP (ref 10–60)
ANION GAP SERPL CALC-SCNC: 5 MMOL/L — SIGNIFICANT CHANGE UP (ref 5–17)
AST SERPL-CCNC: 24 U/L — SIGNIFICANT CHANGE UP (ref 10–40)
BILIRUB SERPL-MCNC: 0.3 MG/DL — SIGNIFICANT CHANGE UP (ref 0.2–1.2)
BUN SERPL-MCNC: 28 MG/DL — HIGH (ref 7–18)
CALCIUM SERPL-MCNC: 8 MG/DL — LOW (ref 8.4–10.5)
CHLORIDE SERPL-SCNC: 101 MMOL/L — SIGNIFICANT CHANGE UP (ref 96–108)
CO2 SERPL-SCNC: 32 MMOL/L — HIGH (ref 22–31)
CREAT SERPL-MCNC: 0.8 MG/DL — SIGNIFICANT CHANGE UP (ref 0.5–1.3)
CRP SERPL-MCNC: 78 MG/L — HIGH
D DIMER BLD IA.RAPID-MCNC: 568 NG/ML DDU — HIGH
FERRITIN SERPL-MCNC: 472 NG/ML — HIGH (ref 30–400)
GLUCOSE BLDC GLUCOMTR-MCNC: 172 MG/DL — HIGH (ref 70–99)
GLUCOSE BLDC GLUCOMTR-MCNC: 178 MG/DL — HIGH (ref 70–99)
GLUCOSE BLDC GLUCOMTR-MCNC: 239 MG/DL — HIGH (ref 70–99)
GLUCOSE BLDC GLUCOMTR-MCNC: 307 MG/DL — HIGH (ref 70–99)
GLUCOSE SERPL-MCNC: 120 MG/DL — HIGH (ref 70–99)
HCT VFR BLD CALC: 37.9 % — LOW (ref 39–50)
HGB BLD-MCNC: 12.4 G/DL — LOW (ref 13–17)
LDH SERPL L TO P-CCNC: 211 U/L — SIGNIFICANT CHANGE UP (ref 120–225)
MAGNESIUM SERPL-MCNC: 2.1 MG/DL — SIGNIFICANT CHANGE UP (ref 1.6–2.6)
MCHC RBC-ENTMCNC: 30.3 PG — SIGNIFICANT CHANGE UP (ref 27–34)
MCHC RBC-ENTMCNC: 32.7 GM/DL — SIGNIFICANT CHANGE UP (ref 32–36)
MCV RBC AUTO: 92.7 FL — SIGNIFICANT CHANGE UP (ref 80–100)
NRBC # BLD: 0 /100 WBCS — SIGNIFICANT CHANGE UP (ref 0–0)
PHOSPHATE SERPL-MCNC: 2.4 MG/DL — LOW (ref 2.5–4.5)
PLATELET # BLD AUTO: 156 K/UL — SIGNIFICANT CHANGE UP (ref 150–400)
POTASSIUM SERPL-MCNC: 3.5 MMOL/L — SIGNIFICANT CHANGE UP (ref 3.5–5.3)
POTASSIUM SERPL-SCNC: 3.5 MMOL/L — SIGNIFICANT CHANGE UP (ref 3.5–5.3)
PROT SERPL-MCNC: 5.8 G/DL — LOW (ref 6–8.3)
RBC # BLD: 4.09 M/UL — LOW (ref 4.2–5.8)
RBC # FLD: 12.1 % — SIGNIFICANT CHANGE UP (ref 10.3–14.5)
SODIUM SERPL-SCNC: 138 MMOL/L — SIGNIFICANT CHANGE UP (ref 135–145)
WBC # BLD: 4.28 K/UL — SIGNIFICANT CHANGE UP (ref 3.8–10.5)
WBC # FLD AUTO: 4.28 K/UL — SIGNIFICANT CHANGE UP (ref 3.8–10.5)

## 2021-03-17 PROCEDURE — 99233 SBSQ HOSP IP/OBS HIGH 50: CPT | Mod: CS,GC

## 2021-03-17 RX ORDER — SIMVASTATIN 20 MG/1
1 TABLET, FILM COATED ORAL
Qty: 0 | Refills: 0 | DISCHARGE

## 2021-03-17 RX ORDER — SODIUM,POTASSIUM PHOSPHATES 278-250MG
1 POWDER IN PACKET (EA) ORAL
Refills: 0 | Status: COMPLETED | OUTPATIENT
Start: 2021-03-17 | End: 2021-03-17

## 2021-03-17 RX ORDER — POTASSIUM CHLORIDE 20 MEQ
20 PACKET (EA) ORAL ONCE
Refills: 0 | Status: COMPLETED | OUTPATIENT
Start: 2021-03-17 | End: 2021-03-17

## 2021-03-17 RX ORDER — ALBUTEROL 90 UG/1
2 AEROSOL, METERED ORAL EVERY 6 HOURS
Refills: 0 | Status: DISCONTINUED | OUTPATIENT
Start: 2021-03-17 | End: 2021-03-19

## 2021-03-17 RX ORDER — POTASSIUM CHLORIDE 20 MEQ
40 PACKET (EA) ORAL ONCE
Refills: 0 | Status: DISCONTINUED | OUTPATIENT
Start: 2021-03-17 | End: 2021-03-17

## 2021-03-17 RX ORDER — ACETAMINOPHEN 500 MG
650 TABLET ORAL ONCE
Refills: 0 | Status: COMPLETED | OUTPATIENT
Start: 2021-03-17 | End: 2021-03-17

## 2021-03-17 RX ADMIN — SIMVASTATIN 20 MILLIGRAM(S): 20 TABLET, FILM COATED ORAL at 23:14

## 2021-03-17 RX ADMIN — MIRTAZAPINE 15 MILLIGRAM(S): 45 TABLET, ORALLY DISINTEGRATING ORAL at 23:14

## 2021-03-17 RX ADMIN — Medication 4: at 13:24

## 2021-03-17 RX ADMIN — Medication 650 MILLIGRAM(S): at 09:34

## 2021-03-17 RX ADMIN — Medication 2: at 17:08

## 2021-03-17 RX ADMIN — Medication 12.5 MILLIGRAM(S): at 05:12

## 2021-03-17 RX ADMIN — Medication 1: at 23:13

## 2021-03-17 RX ADMIN — PANTOPRAZOLE SODIUM 40 MILLIGRAM(S): 20 TABLET, DELAYED RELEASE ORAL at 05:12

## 2021-03-17 RX ADMIN — Medication 81 MILLIGRAM(S): at 11:25

## 2021-03-17 RX ADMIN — Medication 1: at 08:07

## 2021-03-17 RX ADMIN — Medication 1 PACKET(S): at 08:22

## 2021-03-17 RX ADMIN — ALBUTEROL 2 PUFF(S): 90 AEROSOL, METERED ORAL at 15:17

## 2021-03-17 RX ADMIN — Medication 325 MILLIGRAM(S): at 11:25

## 2021-03-17 RX ADMIN — Medication 20 MILLIEQUIVALENT(S): at 09:43

## 2021-03-17 RX ADMIN — ALBUTEROL 2 PUFF(S): 90 AEROSOL, METERED ORAL at 09:39

## 2021-03-17 RX ADMIN — ALBUTEROL 2 PUFF(S): 90 AEROSOL, METERED ORAL at 23:14

## 2021-03-17 RX ADMIN — Medication 6 MILLIGRAM(S): at 05:11

## 2021-03-17 RX ADMIN — REMDESIVIR 500 MILLIGRAM(S): 5 INJECTION INTRAVENOUS at 02:27

## 2021-03-17 RX ADMIN — ENOXAPARIN SODIUM 40 MILLIGRAM(S): 100 INJECTION SUBCUTANEOUS at 11:25

## 2021-03-17 RX ADMIN — Medication 1 PACKET(S): at 09:39

## 2021-03-17 RX ADMIN — Medication 650 MILLIGRAM(S): at 08:14

## 2021-03-17 NOTE — PROGRESS NOTE ADULT - PROBLEM SELECTOR PLAN 2
Currently BP controlled without anti-HTN meds   c/w ASA  Will verify meds at his pharmacy [facility] [phone] Currently BP controlled without anti-HTN meds   c/w ASA  Med [facility] [phone]

## 2021-03-17 NOTE — PROGRESS NOTE ADULT - PROBLEM SELECTOR PLAN 3
Troponin elevated, downtrendin.066-->0.047-->0.060--->0.056  c/w ASA  c/w Tele Troponin elevated, downtrendin.066-->0.047-->0.060--->0.056  c/w ASA  c/w Tele  - Echo pending

## 2021-03-17 NOTE — PROGRESS NOTE ADULT - PROBLEM SELECTOR PLAN 1
c/w Remdesivir, Dexamethasone , Day 2  Continue with albuterol, antitussive  Supplemental oxygen to keep SpO2 92% and above. Wean as tolerated.  Monitor oxygenation  Airborne/contact isolation c/w Remdesivir, Dexamethasone since 3/15  Continue with albuterol, antitussive  titrating O2 down goal SpO2 92% and above  Monitor oxygenation  Airborne/contact isolation  currently on 2L   qualified for home O2 88% on RA - sent for home O2

## 2021-03-17 NOTE — PROGRESS NOTE ADULT - SUBJECTIVE AND OBJECTIVE BOX
PGY-1 Progress Note discussed with attending    PAGER #: [[phone]] TILL 5:00 PM  PLEASE CONTACT ON CALL TEAM:   - On Call Team (Please refer to Toni) FROM 5:00 PM - 8:30PM  - Nightfloat Team FROM 8:30 -7:30 AM    CHIEF COMPLAINT & BRIEF HOSPITAL COURSE:  86 year old male with a PMH of HTN, DM who presented with a chief complaint of fever, cough and sob. COVID PCR+/Ab neg.  D-dimer 1557. CT neg for PE. . Trop up to 0.06 max 2/2 demand ischemia. Echo ordered. Rem/Dex since 3/15. Stable on 3L 96%    INTERVAL HPI/OVERNIGHT EVENTS:   Patient comfortable on 3L NC saturating 95-98%. No overnight events. On D2 of Rem/Dex. 88% oxygen at rest, improves with NC to 95-98% 2L. Sent request for home O2.    REVIEW OF SYSTEMS:  CONSTITUTIONAL: No fever, weight loss, or fatigue  RESPIRATORY: No cough, wheezing, chills or hemoptysis; + shortness of breath improving  CARDIOVASCULAR: No chest pain, palpitations, dizziness, or leg swelling  GASTROINTESTINAL: No abdominal pain. No nausea, vomiting, or hematemesis; No diarrhea or constipation. No melena or hematochezia.  GENITOURINARY: No dysuria or hematuria, urinary frequency  NEUROLOGICAL: No headaches, memory loss, loss of strength, numbness, or tremors  SKIN: No itching, burning, rashes, or lesions     MEDICATIONS  (STANDING):  ALBUTerol    90 MICROgram(s) HFA Inhaler 2 Puff(s) Inhalation every 6 hours  aspirin  chewable 81 milliGRAM(s) Oral daily  dexAMETHasone  Injectable 6 milliGRAM(s) IV Push daily  enoxaparin Injectable 40 milliGRAM(s) SubCutaneous daily  ferrous    sulfate 325 milliGRAM(s) Oral daily  insulin lispro (ADMELOG) corrective regimen sliding scale   SubCutaneous Before meals and at bedtime  metoprolol tartrate 12.5 milliGRAM(s) Oral two times a day  mirtazapine 15 milliGRAM(s) Oral at bedtime  pantoprazole    Tablet 40 milliGRAM(s) Oral before breakfast  remdesivir  IVPB   IV Intermittent   remdesivir  IVPB 100 milliGRAM(s) IV Intermittent every 24 hours  simvastatin 20 milliGRAM(s) Oral at bedtime    MEDICATIONS  (PRN):  acetaminophen   Tablet .. 650 milliGRAM(s) Oral every 4 hours PRN Temp greater or equal to 38.5C (101.3F)  benzonatate 100 milliGRAM(s) Oral three times a day PRN Cough  guaifenesin/dextromethorphan  Syrup 10 milliLiter(s) Oral every 4 hours PRN Cough      Vital Signs Last 24 Hrs  T(C): 36.5 (17 Mar 2021 11:42), Max: 37.3 (17 Mar 2021 04:45)  T(F): 97.7 (17 Mar 2021 11:42), Max: 99.1 (17 Mar 2021 04:45)  HR: 56 (17 Mar 2021 11:42) (56 - 64)  BP: 116/58 (17 Mar 2021 11:42) (116/58 - 134/83)  BP(mean): --  RR: 18 (17 Mar 2021 11:42) (18 - 18)  SpO2: 98% (17 Mar 2021 11:42) (95% - 99%)    PHYSICAL EXAMINATION:  GENERAL: NAD, well built  HEAD:  Atraumatic, Normocephalic  EYES:  conjunctiva and sclera clear  NECK: Supple, No JVD, Normal thyroid  CHEST/LUNG: Clear to auscultation. Clear to percussion bilaterally; No rales, rhonchi, wheezing, or rubs  HEART: Regular rate and rhythm; No murmurs, rubs, or gallops  ABDOMEN: Soft, Nontender, Nondistended; Bowel sounds present  NERVOUS SYSTEM:  Alert & Oriented X3,    EXTREMITIES:  2+ Peripheral Pulses, No clubbing, cyanosis, or edema  SKIN: warm dry                          12.4   4.28  )-----------( 156      ( 17 Mar 2021 06:54 )             37.9     03-17    138  |  101  |  28<H>  ----------------------------<  120<H>  3.5   |  32<H>  |  0.80    Ca    8.0<L>      17 Mar 2021 06:54  Phos  2.4     03-17  Mg     2.1     03-17    TPro  5.8<L>  /  Alb  2.6<L>  /  TBili  0.3  /  DBili  x   /  AST  24  /  ALT  28  /  AlkPhos  76  03-17    LIVER FUNCTIONS - ( 17 Mar 2021 06:54 )  Alb: 2.6 g/dL / Pro: 5.8 g/dL / ALK PHOS: 76 U/L / ALT: 28 U/L DA / AST: 24 U/L / GGT: x           CARDIAC MARKERS ( 16 Mar 2021 13:17 )  0.056 ng/mL / x     / x     / x     / x      CARDIAC MARKERS ( 16 Mar 2021 06:03 )  0.060 ng/mL / x     / 114 U/L / x     / x      CARDIAC MARKERS ( 16 Mar 2021 03:18 )  0.047 ng/mL / x     / 110 U/L / x     / 1.4 ng/mL  CARDIAC MARKERS ( 15 Mar 2021 16:26 )  0.066 ng/mL / x     / 104 U/L / x     / x          PT/INR - ( 16 Mar 2021 06:03 )   PT: 12.0 sec;   INR: 1.01 ratio         PTT - ( 16 Mar 2021 06:03 )  PTT:29.1 sec        I&O's Summary      CAPILLARY BLOOD GLUCOSE      POCT Blood Glucose.: 307 mg/dL (17 Mar 2021 13:18)    CAPILLARY BLOOD GLUCOSE      POCT Blood Glucose.: 307 mg/dL (17 Mar 2021 13:18)  POCT Blood Glucose.: 178 mg/dL (17 Mar 2021 08:01)  POCT Blood Glucose.: 192 mg/dL (16 Mar 2021 20:55)  POCT Blood Glucose.: 204 mg/dL (16 Mar 2021 16:32)      RADIOLOGY & ADDITIONAL TESTS:                   PGY-1 Progress Note discussed with attending    PAGER #: [[phone]] TILL 5:00 PM  PLEASE CONTACT ON CALL TEAM:   - On Call Team (Please refer to Toni) FROM 5:00 PM - 8:30PM  - Nightfloat Team FROM 8:30 -7:30 AM    CHIEF COMPLAINT & BRIEF HOSPITAL COURSE:  86 year old male with a PMH of HTN, DM who presented with a chief complaint of fever, cough and sob. COVID PCR+/Ab neg.  D-dimer 1557. CT neg for PE. . Trop up to 0.06 max 2/2 demand ischemia. Echo ordered. Rem/Dex since 3/15. Stable on 3L 96%    INTERVAL HPI/OVERNIGHT EVENTS:   Patient comfortable on 3L NC saturating 95-98%. No overnight events. On D2 of Rem/Dex. 88% oxygen at rest, improves with NC to 95-98% 2L. Sent request for home O2.    REVIEW OF SYSTEMS:  CONSTITUTIONAL: No fever, weight loss, or fatigue  RESPIRATORY: No cough, wheezing, chills or hemoptysis; + shortness of breath improving  CARDIOVASCULAR: No chest pain, palpitations, dizziness, or leg swelling  GASTROINTESTINAL: No abdominal pain. No nausea, vomiting, or hematemesis; No diarrhea or constipation. No melena or hematochezia.  GENITOURINARY: No dysuria or hematuria, urinary frequency  NEUROLOGICAL: No headaches, memory loss, loss of strength, numbness, or tremors  SKIN: No itching, burning, rashes, or lesions     MEDICATIONS  (STANDING):  ALBUTerol    90 MICROgram(s) HFA Inhaler 2 Puff(s) Inhalation every 6 hours  aspirin  chewable 81 milliGRAM(s) Oral daily  dexAMETHasone  Injectable 6 milliGRAM(s) IV Push daily  enoxaparin Injectable 40 milliGRAM(s) SubCutaneous daily  ferrous    sulfate 325 milliGRAM(s) Oral daily  insulin lispro (ADMELOG) corrective regimen sliding scale   SubCutaneous Before meals and at bedtime  metoprolol tartrate 12.5 milliGRAM(s) Oral two times a day  mirtazapine 15 milliGRAM(s) Oral at bedtime  pantoprazole    Tablet 40 milliGRAM(s) Oral before breakfast  remdesivir  IVPB   IV Intermittent   remdesivir  IVPB 100 milliGRAM(s) IV Intermittent every 24 hours  simvastatin 20 milliGRAM(s) Oral at bedtime    MEDICATIONS  (PRN):  acetaminophen   Tablet .. 650 milliGRAM(s) Oral every 4 hours PRN Temp greater or equal to 38.5C (101.3F)  benzonatate 100 milliGRAM(s) Oral three times a day PRN Cough  guaifenesin/dextromethorphan  Syrup 10 milliLiter(s) Oral every 4 hours PRN Cough      Vital Signs Last 24 Hrs  T(C): 36.5 (17 Mar 2021 11:42), Max: 37.3 (17 Mar 2021 04:45)  T(F): 97.7 (17 Mar 2021 11:42), Max: 99.1 (17 Mar 2021 04:45)  HR: 56 (17 Mar 2021 11:42) (56 - 64)  BP: 116/58 (17 Mar 2021 11:42) (116/58 - 134/83)  BP(mean): --  RR: 18 (17 Mar 2021 11:42) (18 - 18)  SpO2: 98% (17 Mar 2021 11:42) (95% - 99%)    PHYSICAL EXAMINATION:  GENERAL: NAD, well built  HEAD:  Atraumatic, Normocephalic  EYES:  conjunctiva and sclera clear  NECK: Supple, No JVD, Normal thyroid  CHEST/LUNG: scant course breath sounds in b/l lung fields. equal air entry. No rales,  wheezing, or rubs  HEART: Regular rate and rhythm; No murmurs, rubs, or gallops  ABDOMEN: Soft, Nontender, Nondistended; Bowel sounds present  NERVOUS SYSTEM:  Alert & Oriented X3  EXTREMITIES:  2+ Peripheral Pulses, No clubbing, cyanosis, or edema  SKIN: warm dry                          12.4   4.28  )-----------( 156      ( 17 Mar 2021 06:54 )             37.9     03-17    138  |  101  |  28<H>  ----------------------------<  120<H>  3.5   |  32<H>  |  0.80    Ca    8.0<L>      17 Mar 2021 06:54  Phos  2.4     03-17  Mg     2.1     03-17    TPro  5.8<L>  /  Alb  2.6<L>  /  TBili  0.3  /  DBili  x   /  AST  24  /  ALT  28  /  AlkPhos  76  03-17    LIVER FUNCTIONS - ( 17 Mar 2021 06:54 )  Alb: 2.6 g/dL / Pro: 5.8 g/dL / ALK PHOS: 76 U/L / ALT: 28 U/L DA / AST: 24 U/L / GGT: x           CARDIAC MARKERS ( 16 Mar 2021 13:17 )  0.056 ng/mL / x     / x     / x     / x      CARDIAC MARKERS ( 16 Mar 2021 06:03 )  0.060 ng/mL / x     / 114 U/L / x     / x      CARDIAC MARKERS ( 16 Mar 2021 03:18 )  0.047 ng/mL / x     / 110 U/L / x     / 1.4 ng/mL  CARDIAC MARKERS ( 15 Mar 2021 16:26 )  0.066 ng/mL / x     / 104 U/L / x     / x          PT/INR - ( 16 Mar 2021 06:03 )   PT: 12.0 sec;   INR: 1.01 ratio         PTT - ( 16 Mar 2021 06:03 )  PTT:29.1 sec        I&O's Summary      CAPILLARY BLOOD GLUCOSE      POCT Blood Glucose.: 307 mg/dL (17 Mar 2021 13:18)    CAPILLARY BLOOD GLUCOSE      POCT Blood Glucose.: 307 mg/dL (17 Mar 2021 13:18)  POCT Blood Glucose.: 178 mg/dL (17 Mar 2021 08:01)  POCT Blood Glucose.: 192 mg/dL (16 Mar 2021 20:55)  POCT Blood Glucose.: 204 mg/dL (16 Mar 2021 16:32)      RADIOLOGY & ADDITIONAL TESTS:                   PGY-1 Progress Note discussed with attending    PAGER #: [[phone]] TILL 5:00 PM  PLEASE CONTACT ON CALL TEAM:   - On Call Team (Please refer to Toni) FROM 5:00 PM - 8:30PM  - Nightfloat Team FROM 8:30 -7:30 AM    CHIEF COMPLAINT & BRIEF HOSPITAL COURSE:  86 year old male with a PMH of HTN, DM who presented with a chief complaint of fever, cough and sob. COVID PCR+/Ab neg.  D-dimer 1557. CT neg for PE. . Trop up to 0.06 max 2/2 demand ischemia. Echo ordered. Rem/Dex since 3/15. Stable on 3L 96%    INTERVAL HPI/OVERNIGHT EVENTS:   Patient comfortable on 3L NC saturating 95-98%. No overnight events. On D2 of Rem/Dex. 88% oxygen at rest, improves with NC to 95-98% 2L. Sent request for home O2.    REVIEW OF SYSTEMS:  CONSTITUTIONAL: No fever, weight loss, or fatigue  RESPIRATORY: No cough, wheezing, chills or hemoptysis; + shortness of breath improving  CARDIOVASCULAR: No chest pain, palpitations, dizziness, or leg swelling  GASTROINTESTINAL: No abdominal pain. No nausea, vomiting, or hematemesis; No diarrhea or constipation. No melena or hematochezia.  GENITOURINARY: No dysuria or hematuria, urinary frequency  NEUROLOGICAL: No headaches, memory loss, loss of strength, numbness, or tremors  SKIN: No itching, burning, rashes, or lesions     MEDICATIONS  (STANDING):  ALBUTerol    90 MICROgram(s) HFA Inhaler 2 Puff(s) Inhalation every 6 hours  aspirin  chewable 81 milliGRAM(s) Oral daily  dexAMETHasone  Injectable 6 milliGRAM(s) IV Push daily  enoxaparin Injectable 40 milliGRAM(s) SubCutaneous daily  ferrous    sulfate 325 milliGRAM(s) Oral daily  insulin lispro (ADMELOG) corrective regimen sliding scale   SubCutaneous Before meals and at bedtime  metoprolol tartrate 12.5 milliGRAM(s) Oral two times a day  mirtazapine 15 milliGRAM(s) Oral at bedtime  pantoprazole    Tablet 40 milliGRAM(s) Oral before breakfast  remdesivir  IVPB   IV Intermittent   remdesivir  IVPB 100 milliGRAM(s) IV Intermittent every 24 hours  simvastatin 20 milliGRAM(s) Oral at bedtime    MEDICATIONS  (PRN):  acetaminophen   Tablet .. 650 milliGRAM(s) Oral every 4 hours PRN Temp greater or equal to 38.5C (101.3F)  benzonatate 100 milliGRAM(s) Oral three times a day PRN Cough  guaifenesin/dextromethorphan  Syrup 10 milliLiter(s) Oral every 4 hours PRN Cough      Vital Signs Last 24 Hrs  T(C): 36.5 (17 Mar 2021 11:42), Max: 37.3 (17 Mar 2021 04:45)  T(F): 97.7 (17 Mar 2021 11:42), Max: 99.1 (17 Mar 2021 04:45)  HR: 56 (17 Mar 2021 11:42) (56 - 64)  BP: 116/58 (17 Mar 2021 11:42) (116/58 - 134/83)  BP(mean): --  RR: 18 (17 Mar 2021 11:42) (18 - 18)  SpO2: 98% (17 Mar 2021 11:42) (95% - 99%)    PHYSICAL EXAMINATION:  GENERAL: NAD, well built  HEAD:  Atraumatic, Normocephalic  EYES:  conjunctiva and sclera clear  NECK: Supple, No JVD, Normal thyroid  CHEST/LUNG: scant course breath sounds in b/l lung fields. equal air entry. No rales,  wheezing, or rubs  HEART: Regular rate and rhythm; No murmurs, rubs, or gallops  ABDOMEN: Soft, Nontender, Nondistended; Bowel sounds present  NERVOUS SYSTEM:  Alert & Oriented X3  EXTREMITIES:  2+ Peripheral Pulses, No clubbing, cyanosis, or edema  SKIN: warm dry                          12.4   4.28  )-----------( 156      ( 17 Mar 2021 06:54 )             37.9     03-17    138  |  101  |  28<H>  ----------------------------<  120<H>  3.5   |  32<H>  |  0.80    Ca    8.0<L>      17 Mar 2021 06:54  Phos  2.4     03-17  Mg     2.1     03-17    TPro  5.8<L>  /  Alb  2.6<L>  /  TBili  0.3  /  DBili  x   /  AST  24  /  ALT  28  /  AlkPhos  76  03-17    LIVER FUNCTIONS - ( 17 Mar 2021 06:54 )  Alb: 2.6 g/dL / Pro: 5.8 g/dL / ALK PHOS: 76 U/L / ALT: 28 U/L DA / AST: 24 U/L / GGT: x           CARDIAC MARKERS ( 16 Mar 2021 13:17 )  0.056 ng/mL / x     / x     / x     / x      CARDIAC MARKERS ( 16 Mar 2021 06:03 )  0.060 ng/mL / x     / 114 U/L / x     / x      CARDIAC MARKERS ( 16 Mar 2021 03:18 )  0.047 ng/mL / x     / 110 U/L / x     / 1.4 ng/mL  CARDIAC MARKERS ( 15 Mar 2021 16:26 )  0.066 ng/mL / x     / 104 U/L / x     / x          PT/INR - ( 16 Mar 2021 06:03 )   PT: 12.0 sec;   INR: 1.01 ratio         PTT - ( 16 Mar 2021 06:03 )  PTT:29.1 sec        I&O's Summary      CAPILLARY BLOOD GLUCOSE  POCT Blood Glucose.: 307 mg/dL (17 Mar 2021 13:18)    CAPILLARY BLOOD GLUCOSE  POCT Blood Glucose.: 307 mg/dL (17 Mar 2021 13:18)  POCT Blood Glucose.: 178 mg/dL (17 Mar 2021 08:01)  POCT Blood Glucose.: 192 mg/dL (16 Mar 2021 20:55)  POCT Blood Glucose.: 204 mg/dL (16 Mar 2021 16:32)      RADIOLOGY & ADDITIONAL TESTS:    < from: CT Angio Chest w/ IV Cont (03.15.21 @ 22:27) >    EXAM:  CT ANGIO CHEST (W)AW IC                            PROCEDURE DATE:  03/15/2021          INTERPRETATION:  CLINICAL INFORMATION: Cough, shortness of breath, concern for PE, history of heart disease    COMPARISON: None.    CONTRAST/COMPLICATIONS:  IV Contrast: Omnipaque 350 / 47 cc administered / 53 cc discarded.  Oral Contrast: NONE  Complications: None reported at time of study completion    PROCEDURE:  CT Angiography of the Chest.  Sagittal and coronal reformats were performed as well as3D (MIP) reconstructions.    FINDINGS:    LUNGS AND AIRWAYS: Patent central airways.  Streaky bilateral subsegmental atelectasis, worst in the lung bases. No focal lung consolidation.  PLEURA: No pleural effusion.  MEDIASTINUM AND SALLY: No lymphadenopathy.  VESSELS: No evidence of pulmonary embolism, though multiple segmental pulmonary arteries, particularly at the lung bases, are not well evaluated due to motion artifact.  HEART: Heart size is normal. No pericardial effusion. Coronary artery calcifications.  CHEST WALL AND LOWER NECK: Within normal limits.  VISUALIZED UPPER ABDOMEN: Within normal limits.  BONES: Degenerative changes.    IMPRESSION:  No CT evidence of pulmonary embolism, though multiple segmental pulmonary arteries, particularly at the lung bases, are not well evaluated due to motion artifact.    No pneumonia.      MARCEL WOO MD; Attending Radiologist  This document has been electronically signed. Mar 15 2021 10:51PM    < end of copied text >  < from: Xray Chest 1 View-PORTABLE IMMEDIATE (03.15.21 @ 16:33) >    EXAM:  XR CHEST PORTABLE IMMED 1V                            PROCEDURE DATE:  03/15/2021          INTERPRETATION:  Shortness of breath cough fever.    AP chest.    IMPRESSION:  Heart magnified by AP film shallow inspiration. Unfolding calcificationthoracic aorta. Bibasilar fibrotic/atelectatic stranding. No focal consolidation. Trace right pleural effusion. No acute infiltrate or pleural effusion.        LORENA BURGESS MD; Attending Radiologist  This document has been electronically signed. Mar 15 2021  4:52PM    < end of copied text >

## 2021-03-18 ENCOUNTER — TRANSCRIPTION ENCOUNTER (OUTPATIENT)
Age: 86
End: 2021-03-18

## 2021-03-18 LAB
ALBUMIN SERPL ELPH-MCNC: 2.4 G/DL — LOW (ref 3.5–5)
ALP SERPL-CCNC: 74 U/L — SIGNIFICANT CHANGE UP (ref 40–120)
ALT FLD-CCNC: 31 U/L DA — SIGNIFICANT CHANGE UP (ref 10–60)
ANION GAP SERPL CALC-SCNC: 5 MMOL/L — SIGNIFICANT CHANGE UP (ref 5–17)
AST SERPL-CCNC: 28 U/L — SIGNIFICANT CHANGE UP (ref 10–40)
BILIRUB SERPL-MCNC: 0.3 MG/DL — SIGNIFICANT CHANGE UP (ref 0.2–1.2)
BUN SERPL-MCNC: 27 MG/DL — HIGH (ref 7–18)
CALCIUM SERPL-MCNC: 8.6 MG/DL — SIGNIFICANT CHANGE UP (ref 8.4–10.5)
CHLORIDE SERPL-SCNC: 105 MMOL/L — SIGNIFICANT CHANGE UP (ref 96–108)
CO2 SERPL-SCNC: 32 MMOL/L — HIGH (ref 22–31)
CREAT SERPL-MCNC: 0.69 MG/DL — SIGNIFICANT CHANGE UP (ref 0.5–1.3)
CRP SERPL-MCNC: 40 MG/L — HIGH
FERRITIN SERPL-MCNC: 594 NG/ML — HIGH (ref 30–400)
GLUCOSE BLDC GLUCOMTR-MCNC: 145 MG/DL — HIGH (ref 70–99)
GLUCOSE BLDC GLUCOMTR-MCNC: 153 MG/DL — HIGH (ref 70–99)
GLUCOSE BLDC GLUCOMTR-MCNC: 153 MG/DL — HIGH (ref 70–99)
GLUCOSE BLDC GLUCOMTR-MCNC: 188 MG/DL — HIGH (ref 70–99)
GLUCOSE SERPL-MCNC: 131 MG/DL — HIGH (ref 70–99)
HCT VFR BLD CALC: 38.2 % — LOW (ref 39–50)
HGB BLD-MCNC: 12.8 G/DL — LOW (ref 13–17)
LDH SERPL L TO P-CCNC: 212 U/L — SIGNIFICANT CHANGE UP (ref 120–225)
MAGNESIUM SERPL-MCNC: 2.2 MG/DL — SIGNIFICANT CHANGE UP (ref 1.6–2.6)
MCHC RBC-ENTMCNC: 31.2 PG — SIGNIFICANT CHANGE UP (ref 27–34)
MCHC RBC-ENTMCNC: 33.5 GM/DL — SIGNIFICANT CHANGE UP (ref 32–36)
MCV RBC AUTO: 93.2 FL — SIGNIFICANT CHANGE UP (ref 80–100)
NRBC # BLD: 0 /100 WBCS — SIGNIFICANT CHANGE UP (ref 0–0)
PHOSPHATE SERPL-MCNC: 2.2 MG/DL — LOW (ref 2.5–4.5)
PLATELET # BLD AUTO: 161 K/UL — SIGNIFICANT CHANGE UP (ref 150–400)
POTASSIUM SERPL-MCNC: 3.9 MMOL/L — SIGNIFICANT CHANGE UP (ref 3.5–5.3)
POTASSIUM SERPL-SCNC: 3.9 MMOL/L — SIGNIFICANT CHANGE UP (ref 3.5–5.3)
PROT SERPL-MCNC: 5.7 G/DL — LOW (ref 6–8.3)
RBC # BLD: 4.1 M/UL — LOW (ref 4.2–5.8)
RBC # FLD: 12.1 % — SIGNIFICANT CHANGE UP (ref 10.3–14.5)
SODIUM SERPL-SCNC: 142 MMOL/L — SIGNIFICANT CHANGE UP (ref 135–145)
WBC # BLD: 5.01 K/UL — SIGNIFICANT CHANGE UP (ref 3.8–10.5)
WBC # FLD AUTO: 5.01 K/UL — SIGNIFICANT CHANGE UP (ref 3.8–10.5)

## 2021-03-18 PROCEDURE — 99233 SBSQ HOSP IP/OBS HIGH 50: CPT | Mod: CS,GC

## 2021-03-18 RX ORDER — METOPROLOL TARTRATE 50 MG
12.5 TABLET ORAL
Refills: 0 | Status: DISCONTINUED | OUTPATIENT
Start: 2021-03-18 | End: 2021-03-18

## 2021-03-18 RX ORDER — INSULIN LISPRO 100/ML
2 VIAL (ML) SUBCUTANEOUS
Refills: 0 | Status: DISCONTINUED | OUTPATIENT
Start: 2021-03-18 | End: 2021-03-19

## 2021-03-18 RX ORDER — GUAIFENESIN/DEXTROMETHORPHAN 600MG-30MG
10 TABLET, EXTENDED RELEASE 12 HR ORAL THREE TIMES A DAY
Refills: 0 | Status: DISCONTINUED | OUTPATIENT
Start: 2021-03-18 | End: 2021-03-19

## 2021-03-18 RX ORDER — INSULIN GLARGINE 100 [IU]/ML
4 INJECTION, SOLUTION SUBCUTANEOUS EVERY MORNING
Refills: 0 | Status: DISCONTINUED | OUTPATIENT
Start: 2021-03-18 | End: 2021-03-18

## 2021-03-18 RX ORDER — SODIUM,POTASSIUM PHOSPHATES 278-250MG
1 POWDER IN PACKET (EA) ORAL
Refills: 0 | Status: COMPLETED | OUTPATIENT
Start: 2021-03-18 | End: 2021-03-18

## 2021-03-18 RX ADMIN — ALBUTEROL 2 PUFF(S): 90 AEROSOL, METERED ORAL at 14:51

## 2021-03-18 RX ADMIN — Medication 6 MILLIGRAM(S): at 06:21

## 2021-03-18 RX ADMIN — Medication 1: at 08:21

## 2021-03-18 RX ADMIN — Medication 1: at 11:37

## 2021-03-18 RX ADMIN — ALBUTEROL 2 PUFF(S): 90 AEROSOL, METERED ORAL at 21:47

## 2021-03-18 RX ADMIN — PANTOPRAZOLE SODIUM 40 MILLIGRAM(S): 20 TABLET, DELAYED RELEASE ORAL at 06:21

## 2021-03-18 RX ADMIN — Medication 325 MILLIGRAM(S): at 11:38

## 2021-03-18 RX ADMIN — Medication 10 MILLILITER(S): at 21:52

## 2021-03-18 RX ADMIN — Medication 81 MILLIGRAM(S): at 11:38

## 2021-03-18 RX ADMIN — Medication 2 UNIT(S): at 11:38

## 2021-03-18 RX ADMIN — REMDESIVIR 500 MILLIGRAM(S): 5 INJECTION INTRAVENOUS at 03:03

## 2021-03-18 RX ADMIN — Medication 2 UNIT(S): at 17:43

## 2021-03-18 RX ADMIN — SIMVASTATIN 20 MILLIGRAM(S): 20 TABLET, FILM COATED ORAL at 21:53

## 2021-03-18 RX ADMIN — ENOXAPARIN SODIUM 40 MILLIGRAM(S): 100 INJECTION SUBCUTANEOUS at 11:37

## 2021-03-18 RX ADMIN — Medication 10 MILLILITER(S): at 08:23

## 2021-03-18 RX ADMIN — Medication 12.5 MILLIGRAM(S): at 09:00

## 2021-03-18 RX ADMIN — Medication 200 MILLIGRAM(S): at 14:51

## 2021-03-18 RX ADMIN — ALBUTEROL 2 PUFF(S): 90 AEROSOL, METERED ORAL at 09:01

## 2021-03-18 RX ADMIN — ALBUTEROL 2 PUFF(S): 90 AEROSOL, METERED ORAL at 03:03

## 2021-03-18 RX ADMIN — Medication 1: at 21:46

## 2021-03-18 RX ADMIN — MIRTAZAPINE 15 MILLIGRAM(S): 45 TABLET, ORALLY DISINTEGRATING ORAL at 21:47

## 2021-03-18 RX ADMIN — Medication 1 PACKET(S): at 11:40

## 2021-03-18 RX ADMIN — Medication 1 PACKET(S): at 09:01

## 2021-03-18 NOTE — PROGRESS NOTE ADULT - PROBLEM SELECTOR PLAN 5
DVT ppx: Lovenox SC   GI ppx: will add PPI

## 2021-03-18 NOTE — PROGRESS NOTE ADULT - PROBLEM SELECTOR PLAN 2
Currently BP controlled without anti-HTN meds   c/w ASA  Med Rec [facility] [phone] Currently BP controlled without anti-HTN meds   Med [facility] [phone] DONE  Home Metop succ 25, HCTZD 25, Olmesartan 40  - restarted Metop 12.5 BID with parameters  - c/w ASA

## 2021-03-18 NOTE — PROGRESS NOTE ADULT - PROBLEM SELECTOR PLAN 3
Troponin elevated, downtrendin.066-->0.047-->0.060--->0.056  c/w ASA  c/w Tele  - Echo pending Troponin elevated, downtrendin.066-->0.047-->0.060--->0.056  c/w ASA, metop (lowered dose w/ parameters)  c/w Tele : events of bradycardia  - Echo pending

## 2021-03-18 NOTE — DISCHARGE NOTE PROVIDER - HOSPITAL COURSE
86 year old male with a PMH of HTN, DM who presented with a chief complaint of fever, cough and sob. COVID PCR+/Ab neg.  D-dimer 1557. CT neg for PE. . Trop up to 0.06 max 2/2 demand ischemia. Echo ordered. Rem/Dex since 3/15. Qualified for home O2:  88% oxygen at rest, improves with NC to 95-98% 2L. Sent request for home O2 on 3/17.    Given patient's improved clinical status and current hemodynamic stability, decision was made to discharge. Discussed with attending. Please refer to patient's complete medical chart with documents for a full hospital course, for this is only a brief summary.   86 year old male with a PMH of HTN, DM who presented with a chief complaint of fever, cough and sob. COVID PCR+/Ab neg.  D-dimer 1557. CT neg for PE. . Trop up to 0.06 max 2/2 demand ischemia. Rem/Dex since 3/15. Qualified for home O2:  88% oxygen at rest, improves with NC to 95-98% 2L. Sent request for home O2 on 3/17. Episodes of bradycardia on telemetry, home beta blocker was held, will hold on discharge. EKG shows ****. Will DC on 5 days of Dexa to complete 10 days. Home O2 confirmed delivery on ***    Given patient's improved clinical status and current hemodynamic stability, decision was made to discharge. Discussed with attending. Please refer to patient's complete medical chart with documents for a full hospital course, for this is only a brief summary.   86 year old male with a PMH of HTN, DM who presented with a chief complaint of fever, cough and sob. COVID PCR+/Ab neg.  D-dimer 1557. CT neg for PE. . Trop up to 0.06 max 2/2 demand ischemia. Rem/Dex since 3/15. Qualified for home O2:  88% oxygen at rest, improves with NC to 95-98% 2L. Sent request for home O2 on 3/17. Episodes of bradycardia on telemetry, home beta blocker was held, will hold on discharge. EKG shows sinus bradycardia with left anterior fascicular block. Rate 55, [place] 132ms, , QT/QTc 428/409. Will DC on 5 days of Dexa to complete 10 days and anticoagulate with **** x 30 days. Home O2 delivered to bedside.     Given patient's improved clinical status and current hemodynamic stability, decision was made to discharge. Discussed with attending. Please refer to patient's complete medical chart with documents for a full hospital course, for this is only a brief summary.   86 year old male with a PMH of HTN, DM who presented with a chief complaint of fever, cough and sob. COVID PCR+/Ab neg.  D-dimer 1557. CT neg for PE. . Trop up to 0.06 max 2/2 demand ischemia. Rem/Dex since 3/15. Qualified for home O2:  88% oxygen at rest, improves with NC to 95-98% 2L. Sent request for home O2 on 3/17. Episodes of bradycardia on telemetry, home beta blocker was held, will hold on discharge. EKG shows sinus bradycardia with left anterior fascicular block. Rate 55, [place] 132ms, , QT/QTc 428/409. Will DC on 5 days of Dexa to complete 10 days and anticoagulate with Rivaroxaban x 30 days. Home O2 delivered to bedside.     Given patient's improved clinical status and current hemodynamic stability, decision was made to discharge. Discussed with attending. Please refer to patient's complete medical chart with documents for a full hospital course, for this is only a brief summary.   86 year old male with a PMH of HTN, DM who presented with a chief complaint of fever, cough and sob. COVID PCR+/Ab neg.  D-dimer 1557. CT neg for PE. . Trop up to 0.06 max 2/2 demand ischemia. Rem/Dex since 3/15. Qualified for home O2:  88% oxygen at rest, improves with NC to 95-98% 2L. Sent request for home O2 on 3/17. Episodes of bradycardia on telemetry, home beta blocker was held, will hold on discharge. EKG shows sinus bradycardia with left anterior fascicular block. Rate 55, [place] 132ms, , QT/QTc 428/409. Will DC on 5 days of Dexa to complete 10 days and anticoagulate with Rivaroxaban x 30 days. Home O2 delivered to bedside.     Given patient's improved clinical status and current hemodynamic stability, decision was made to discharge. Discussed with attending. Please refer to patient's complete medical chart with documents for a full hospital course, for this is only a brief summary.    Patient's proxy was updated about discharge plans.  Patient was interviewed and counseled through  phone.

## 2021-03-18 NOTE — DISCHARGE NOTE PROVIDER - NSDCCPCAREPLAN_GEN_ALL_CORE_FT
PRINCIPAL DISCHARGE DIAGNOSIS  Diagnosis: COVID-19 virus infection  Assessment and Plan of Treatment:       SECONDARY DISCHARGE DIAGNOSES  Diagnosis: Demand ischemia  Assessment and Plan of Treatment:     Diagnosis: HTN (hypertension)  Assessment and Plan of Treatment:      PRINCIPAL DISCHARGE DIAGNOSIS  Diagnosis: COVID-19 virus infection  Assessment and Plan of Treatment: You presented with fever, cough and shortness of breath. Your COVID PCR + was positive and your COVID Antibody test was negative, this means that you have a new coronavirus infection. As your risk of clotting increases with COVID and your maker called  D-dimer was elevated at 1557 we performed a CT scan of your chest to rule out a pulmonary blood clot. This CT chest was negative for clot, you do not have a pulmonary embolism.      SECONDARY DISCHARGE DIAGNOSES  Diagnosis: Bradycardia  Assessment and Plan of Treatment: You had episodes of bradycardia on [facility] telemetry monitoring during the evening. Your home beta blocker medication Metoprolol was stopped because it can slow your heart rate even further. Please DO NOT continue this medication until you see your primary care physician and cardiologist.   on discharge. EKG shows ****. Will DC on 5 days of Dexa to complete 10 days. Home O2 confirmed delivery on ***    Diagnosis: Demand ischemia  Assessment and Plan of Treatment:    . Trop up to 0.06 max 2/2 demand ischemia. Rem/Dex since 3/15. Qualified for home O2:  88% oxygen at rest, improves with NC to 95-98% 2L. Sent request for home O2 on 3/17. Episodes of bradycardia on telemetry, home beta blocker was held, will hold on discharge. EKG shows ****. Will DC on 5 days of Dexa to complete 10 days. Home O2 confirmed delivery on ***    Diagnosis: HTN (hypertension)  Assessment and Plan of Treatment:      PRINCIPAL DISCHARGE DIAGNOSIS  Diagnosis: COVID-19 virus infection  Assessment and Plan of Treatment: You presented with fever, cough and shortness of breath. Your COVID PCR + was positive and your COVID Antibody test was negative, this means that you have a new coronavirus infection. As your risk of clotting increases with COVID and your maker called  D-dimer was elevated at 1557 we performed a CT scan of your chest to rule out a pulmonary blood clot. This CT chest was negative for clot, you do not have a pulmonary embolism. You qualified for home oxygen which you should use as needed. You completed a course of Remdesivir. Please keep your oxygen saturation above 92%. Come to the hospital if you experience worsening shortness of breath, chest pain, leg swelling or other concerning symptoms. Continue your Dexamethasone steroid for 5 more days and your aspirin for 30 days at least. Follow up with your primary physician in 1 week.   ------------------------------------------------  1. You should restrict activities outside your home, except for getting medical care.   2. Do not go to work, school, or public areas.   3. Avoid using public transportation, ride-sharing, or taxis.   4. Separate yourself from other people and animals in your home as much as possible UNTIL 3/25/2021.  When you are around other people (e.g., sharing a room or vehicle) you should wear a facemask.  5. Wash your hands often with soap and water for at least 20 seconds, especially after blowing your nose, coughing, or sneezing; going to the bathroom; and before eating or preparing food.  6. Cover your mouth and nose with a tissue when you cough or sneeze. Throw used tissues in a lined trash can. Immediately wash your hands with soap and water for at least 20 seconds  7. High touch surfaces include counters, tabletops, doorknobs, bathroom fixtures, toilets, phones, keyboards, tablets, and bedside tables. 8. Avoid sharing dishes, drinking glasses, cups, eating utensils, towels, or bedding with other people or pets in your home. After using these items, they should be washed thoroughly with soap and water.      SECONDARY DISCHARGE DIAGNOSES  Diagnosis: Bradycardia  Assessment and Plan of Treatment: You had episodes of bradycardia on [facility] telemetry monitoring during the evening. Your home beta blocker medication Metoprolol was stopped because it can slow your heart rate even further. Please DO NOT continue this medication until you see your primary care physician and cardiologist. Your EKG showed ***    Diagnosis: Demand ischemia  Assessment and Plan of Treatment: You presnted with demand ischemia in the setting of covid infection with hypoxia requiring oxygen supplementation. Your pro-BNP was elevated at 746 which indicates increased cardiac stretch and your troponin cardiac enzymes were elevated up to 0.06 max in the setting of demand ischemia. Your EKG did not show any signs of a heart attack. CT angio was negative for pulmonary embolism. You did not have any chest pain. Your chest x-ray showed Unfolding calcification  of your thoracic aorta. Bibasilar fibrotic/atelectatic stranding. No focal consolidation. Trace right pleural effusion. No acute infiltrate or pleural effusion. Please continue your aspirin on discharge and follow up with your primary doctor and cardiologist.    Diagnosis: HTN (hypertension)  Assessment and Plan of Treatment: Hypertension is an elevation in blood pressure, we restarted a reduced dose of your home medication olmesartan as your blood pressure was low and in the normal range. As it began to rise we began your home medications again. Please check your blood pressure at home, eat a healthy diet and limit your intake of fats and salt. Follow up with your primary physician and cardiologist. Do not take your Metoprolol until you see them as your heart rate was slow.     PRINCIPAL DISCHARGE DIAGNOSIS  Diagnosis: COVID-19 virus infection  Assessment and Plan of Treatment: You presented with fever, cough and shortness of breath. Your COVID PCR + was positive and your COVID Antibody test was negative, this means that you have a new coronavirus infection. As your risk of clotting increases with COVID and your maker called  D-dimer was elevated at 1557 we performed a CT scan of your chest to rule out a pulmonary blood clot. This CT chest was negative for clot, you do not have a pulmonary embolism. You qualified for home oxygen which you should use as needed. You completed a course of Remdesivir. Please keep your oxygen saturation above 92%. Come to the hospital if you experience worsening shortness of breath, chest pain, leg swelling or other concerning symptoms. Continue your Dexamethasone steroid for 5 more days and your **** 30 days at least. Follow up with your primary physician in 1 week.   ------------------------------------------------  1. You should restrict activities outside your home, except for getting medical care.   2. Do not go to work, school, or public areas.   3. Avoid using public transportation, ride-sharing, or taxis.   4. Separate yourself from other people and animals in your home as much as possible UNTIL 3/25/2021.  When you are around other people (e.g., sharing a room or vehicle) you should wear a facemask.  5. Wash your hands often with soap and water for at least 20 seconds, especially after blowing your nose, coughing, or sneezing; going to the bathroom; and before eating or preparing food.  6. Cover your mouth and nose with a tissue when you cough or sneeze. Throw used tissues in a lined trash can. Immediately wash your hands with soap and water for at least 20 seconds  7. High touch surfaces include counters, tabletops, doorknobs, bathroom fixtures, toilets, phones, keyboards, tablets, and bedside tables. 8. Avoid sharing dishes, drinking glasses, cups, eating utensils, towels, or bedding with other people or pets in your home. After using these items, they should be washed thoroughly with soap and water.      SECONDARY DISCHARGE DIAGNOSES  Diagnosis: Bradycardia  Assessment and Plan of Treatment: You had episodes of bradycardia on [facility] telemetry monitoring during the evening. Your home beta blocker medication Metoprolol was stopped because it can slow your heart rate even further. Please DO NOT continue this medication until you see your primary care physician and cardiologist. Your EKG shows sinus bradycardia with left anterior fascicular block. Rate 55, [place] 132ms, , QT/QTc 428/409.   Follow up with your cardiologist Dr. Butelr and Dr. Garcia.    Diagnosis: Demand ischemia  Assessment and Plan of Treatment: You presnted with demand ischemia in the setting of covid infection with hypoxia requiring oxygen supplementation. Your pro-BNP was elevated at 746 which indicates increased cardiac stretch and your troponin cardiac enzymes were elevated up to 0.06 max in the setting of demand ischemia. Your EKG did not show any signs of a heart attack. CT angio was negative for pulmonary embolism. You did not have any chest pain. Your chest x-ray showed Unfolding calcification  of your thoracic aorta. Bibasilar fibrotic/atelectatic stranding. No focal consolidation. Trace right pleural effusion. No acute infiltrate or pleural effusion. Please continue your aspirin on discharge and follow up with your primary doctor and cardiologist.    Diagnosis: HTN (hypertension)  Assessment and Plan of Treatment: Hypertension is an elevation in blood pressure, we restarted a reduced dose of your home medication olmesartan as your blood pressure was low and in the normal range. As it began to rise we began your home medications again. Please check your blood pressure at home, eat a healthy diet and limit your intake of fats and salt. Follow up with your primary physician and cardiologist. Do not take your Metoprolol until you see them as your heart rate was slow.     PRINCIPAL DISCHARGE DIAGNOSIS  Diagnosis: COVID-19 virus infection  Assessment and Plan of Treatment: You presented with fever, cough and shortness of breath. Your COVID PCR + was positive and your COVID Antibody test was negative, this means that you have a new coronavirus infection. As your risk of clotting increases with COVID and your maker called  D-dimer was elevated at 1557 we performed a CT scan of your chest to rule out a pulmonary blood clot. This CT chest was negative for clot, you do not have a pulmonary embolism. You qualified for home oxygen which you should use as needed. You completed a course of Remdesivir. Please keep your oxygen saturation above 92%. Come to the hospital if you experience worsening shortness of breath, chest pain, leg swelling or other concerning symptoms. Continue Dexamethasone for 5 more days and rivaroxaban 10mg daily 30 days at least. Follow up with your primary physician in 1 week.   ------------------------------------------------  1. You should restrict activities outside your home, except for getting medical care.   2. Do not go to work, school, or public areas.   3. Avoid using public transportation, ride-sharing, or taxis.   4. Separate yourself from other people and animals in your home as much as possible UNTIL 3/25/2021.  When you are around other people (e.g., sharing a room or vehicle) you should wear a facemask.  5. Wash your hands often with soap and water for at least 20 seconds, especially after blowing your nose, coughing, or sneezing; going to the bathroom; and before eating or preparing food.  6. Cover your mouth and nose with a tissue when you cough or sneeze. Throw used tissues in a lined trash can. Immediately wash your hands with soap and water for at least 20 seconds  7. High touch surfaces include counters, tabletops, doorknobs, bathroom fixtures, toilets, phones, keyboards, tablets, and bedside tables. 8. Avoid sharing dishes, drinking glasses, cups, eating utensils, towels, or bedding with other people or pets in your home. After using these items, they should be washed thoroughly with soap and water.      SECONDARY DISCHARGE DIAGNOSES  Diagnosis: Bradycardia  Assessment and Plan of Treatment: You had episodes of bradycardia on [facility] telemetry monitoring during the evening. Your home beta blocker medication Metoprolol was stopped because it can slow your heart rate even further. Please DO NOT continue this medication until you see your primary care physician and cardiologist. Your EKG shows sinus bradycardia with left anterior fascicular block. Rate 55, [place] 132ms, , QT/QTc 428/409.   Follow up with your cardiologist Dr. Butler and Dr. Garcia.    Diagnosis: Demand ischemia  Assessment and Plan of Treatment: You presnted with demand ischemia in the setting of covid infection with hypoxia requiring oxygen supplementation. Your pro-BNP was elevated at 746 which indicates increased cardiac stretch and your troponin cardiac enzymes were elevated up to 0.06 max in the setting of demand ischemia. Your EKG did not show any signs of a heart attack. CT angio was negative for pulmonary embolism. You did not have any chest pain. Your chest x-ray showed Unfolding calcification  of your thoracic aorta. Bibasilar fibrotic/atelectatic stranding. No focal consolidation. Trace right pleural effusion. No acute infiltrate or pleural effusion. Please continue your aspirin on discharge and follow up with your primary doctor and cardiologist.    Diagnosis: HTN (hypertension)  Assessment and Plan of Treatment: Hypertension is an elevation in blood pressure, we restarted a reduced dose of your home medication olmesartan as your blood pressure was low and in the normal range. As it began to rise we began your home medications again. Please check your blood pressure at home, eat a healthy diet and limit your intake of fats and salt. Follow up with your primary physician and cardiologist. Do not take your Metoprolol until you see them as your heart rate was slow.

## 2021-03-18 NOTE — PROGRESS NOTE ADULT - PROBLEM SELECTOR PLAN 4
Monitor BG ac/HS   Admelog low scale Monitor BG ac/HS   Admelog low scale  9 units sliding scale in 24 hours   Lantus 4 QHS started 3/18 Monitor BG ac/HS , insulin naive  Admelog low scale  9 units sliding scale in 24 hours   Admelog 2 U TID 3/18

## 2021-03-18 NOTE — DISCHARGE NOTE PROVIDER - CARE PROVIDER_API CALL
JYOTI GRAY  [id number] [place] [address]  Phone: ([phone]  Fax: ()-  Follow Up Time: 1 week    GOMEZ DICKENS  Internal Medicine  [address] [place] [address]  Phone: ()-  Fax: ()-  Follow Up Time: 1 week

## 2021-03-18 NOTE — DISCHARGE NOTE PROVIDER - PROVIDER TOKENS
PROVIDER:[TOKEN:[[id number]],FOLLOWUP:[1 week]],PROVIDER:[TOKEN:[[id number]],FOLLOWUP:[1 week]]

## 2021-03-18 NOTE — PROGRESS NOTE ADULT - SUBJECTIVE AND OBJECTIVE BOX
PGY-1 Progress Note discussed with attending    PAGER #: [[phone]] TILL 5:00 PM  PLEASE CONTACT ON CALL TEAM:   - On Call Team (Please refer to Toni) FROM 5:00 PM - 8:30PM  - Nightfloat Team FROM 8:30 -7:30 AM    CHIEF COMPLAINT & BRIEF HOSPITAL COURSE:      INTERVAL HPI/OVERNIGHT EVENTS:       REVIEW OF SYSTEMS:  CONSTITUTIONAL: No fever, weight loss, or fatigue  RESPIRATORY: No cough, wheezing, chills or hemoptysis; No shortness of breath  CARDIOVASCULAR: No chest pain, palpitations, dizziness, or leg swelling  GASTROINTESTINAL: No abdominal pain. No nausea, vomiting, or hematemesis; No diarrhea or constipation. No melena or hematochezia.  GENITOURINARY: No dysuria or hematuria, urinary frequency  NEUROLOGICAL: No headaches, memory loss, loss of strength, numbness, or tremors  SKIN: No itching, burning, rashes, or lesions     MEDICATIONS  (STANDING):  ALBUTerol    90 MICROgram(s) HFA Inhaler 2 Puff(s) Inhalation every 6 hours  aspirin  chewable 81 milliGRAM(s) Oral daily  dexAMETHasone  Injectable 6 milliGRAM(s) IV Push daily  enoxaparin Injectable 40 milliGRAM(s) SubCutaneous daily  ferrous    sulfate 325 milliGRAM(s) Oral daily  insulin lispro (ADMELOG) corrective regimen sliding scale   SubCutaneous Before meals and at bedtime  metoprolol tartrate 12.5 milliGRAM(s) Oral two times a day  mirtazapine 15 milliGRAM(s) Oral at bedtime  pantoprazole    Tablet 40 milliGRAM(s) Oral before breakfast  potassium phosphate / sodium phosphate Powder (PHOS-NaK) 1 Packet(s) Oral every 3 hours  remdesivir  IVPB   IV Intermittent   remdesivir  IVPB 100 milliGRAM(s) IV Intermittent every 24 hours  simvastatin 20 milliGRAM(s) Oral at bedtime    MEDICATIONS  (PRN):  acetaminophen   Tablet .. 650 milliGRAM(s) Oral every 4 hours PRN Temp greater or equal to 38.5C (101.3F)  benzonatate 100 milliGRAM(s) Oral three times a day PRN Cough  guaifenesin/dextromethorphan  Syrup 10 milliLiter(s) Oral every 4 hours PRN Cough      Vital Signs Last 24 Hrs  T(C): 36.7 (18 Mar 2021 07:54), Max: 36.8 (17 Mar 2021 23:46)  T(F): 98.1 (18 Mar 2021 07:54), Max: 98.3 (17 Mar 2021 23:46)  HR: 86 (18 Mar 2021 07:54) (47 - 86)  BP: 137/70 (18 Mar 2021 07:54) (116/58 - 146/76)  BP(mean): --  RR: 18 (18 Mar 2021 07:54) (17 - 18)  SpO2: 90% (18 Mar 2021 08:38) (90% - 98%)    PHYSICAL EXAMINATION:  GENERAL: NAD, well built  HEAD:  Atraumatic, Normocephalic  EYES:  conjunctiva and sclera clear  NECK: Supple, No JVD, Normal thyroid  CHEST/LUNG: Clear to auscultation. Clear to percussion bilaterally; No rales, rhonchi, wheezing, or rubs  HEART: Regular rate and rhythm; No murmurs, rubs, or gallops  ABDOMEN: Soft, Nontender, Nondistended; Bowel sounds present  NERVOUS SYSTEM:  Alert & Oriented X3,    EXTREMITIES:  2+ Peripheral Pulses, No clubbing, cyanosis, or edema  SKIN: warm dry                          12.8   5.01  )-----------( 161      ( 18 Mar 2021 07:14 )             38.2     03-18    142  |  105  |  27<H>  ----------------------------<  131<H>  3.9   |  32<H>  |  0.69    Ca    8.6      18 Mar 2021 07:14  Phos  2.2     03-18  Mg     2.2     03-18    TPro  5.7<L>  /  Alb  2.4<L>  /  TBili  0.3  /  DBili  x   /  AST  28  /  ALT  31  /  AlkPhos  74  03-18    LIVER FUNCTIONS - ( 18 Mar 2021 07:14 )  Alb: 2.4 g/dL / Pro: 5.7 g/dL / ALK PHOS: 74 U/L / ALT: 31 U/L DA / AST: 28 U/L / GGT: x           CARDIAC MARKERS ( 16 Mar 2021 13:17 )  0.056 ng/mL / x     / x     / x     / x                  I&O's Summary    18 Mar 2021 07:01  -  18 Mar 2021 09:31  --------------------------------------------------------  IN: 220 mL / OUT: 0 mL / NET: 220 mL        CAPILLARY BLOOD GLUCOSE      POCT Blood Glucose.: 153 mg/dL (18 Mar 2021 07:45)    CAPILLARY BLOOD GLUCOSE      POCT Blood Glucose.: 153 mg/dL (18 Mar 2021 07:45)  POCT Blood Glucose.: 172 mg/dL (17 Mar 2021 22:21)  POCT Blood Glucose.: 239 mg/dL (17 Mar 2021 16:52)  POCT Blood Glucose.: 307 mg/dL (17 Mar 2021 13:18)      RADIOLOGY & ADDITIONAL TESTS:                   PGY-1 Progress Note discussed with attending    PAGER #: [[phone]] TILL 5:00 PM  PLEASE CONTACT ON CALL TEAM:   - On Call Team (Please refer to Toni) FROM 5:00 PM - 8:30PM  - Nightfloat Team FROM 8:30 -7:30 AM    CHIEF COMPLAINT & BRIEF HOSPITAL COURSE:  86 year old male with a PMH of HTN, DM who presented with a chief complaint of fever, cough and sob. COVID PCR+/ Ab neg.  D-dimer 1557. CT neg for PE. . Trop up to 0.06 max 2/2 demand ischemia. Echo ordered. Rem/Dex since 3/15. Stable on 2L NC 97% > desaturates on room air 88% sent for home O2 on 3/17. Inflammatory markers trending down. O2 requirements decreasing. Pt eval, SW eval as pt lives at home. GOC Full Code.     INTERVAL HPI/OVERNIGHT EVENTS:    Surendra [id number]   Patient reports improved SOB but persistent dry cough. Given Robitussin Patient concerned about elevated blood sugars, was counseled on steroid medication effects on blood sugar.     REVIEW OF SYSTEMS:  CONSTITUTIONAL: No fever, weight loss, or fatigue  RESPIRATORY: + cough, + SOB improving. No wheezing, chills or hemoptysis;  CARDIOVASCULAR: No chest pain, palpitations, dizziness, or leg swelling  GASTROINTESTINAL: No abdominal pain. No nausea, vomiting, or hematemesis; No diarrhea or constipation. No melena or hematochezia.  GENITOURINARY: No dysuria or hematuria, urinary frequency  NEUROLOGICAL: No headaches, memory loss, loss of strength, numbness, or tremors  SKIN: No itching, burning, rashes, or lesions     MEDICATIONS  (STANDING):  ALBUTerol    90 MICROgram(s) HFA Inhaler 2 Puff(s) Inhalation every 6 hours  aspirin  chewable 81 milliGRAM(s) Oral daily  dexAMETHasone  Injectable 6 milliGRAM(s) IV Push daily  enoxaparin Injectable 40 milliGRAM(s) SubCutaneous daily  ferrous    sulfate 325 milliGRAM(s) Oral daily  insulin lispro (ADMELOG) corrective regimen sliding scale   SubCutaneous Before meals and at bedtime  metoprolol tartrate 12.5 milliGRAM(s) Oral two times a day  mirtazapine 15 milliGRAM(s) Oral at bedtime  pantoprazole    Tablet 40 milliGRAM(s) Oral before breakfast  potassium phosphate / sodium phosphate Powder (PHOS-NaK) 1 Packet(s) Oral every 3 hours  remdesivir  IVPB   IV Intermittent   remdesivir  IVPB 100 milliGRAM(s) IV Intermittent every 24 hours  simvastatin 20 milliGRAM(s) Oral at bedtime    MEDICATIONS  (PRN):  acetaminophen   Tablet .. 650 milliGRAM(s) Oral every 4 hours PRN Temp greater or equal to 38.5C (101.3F)  benzonatate 100 milliGRAM(s) Oral three times a day PRN Cough  guaifenesin/dextromethorphan  Syrup 10 milliLiter(s) Oral every 4 hours PRN Cough      Vital Signs Last 24 Hrs  T(C): 36.7 (18 Mar 2021 07:54), Max: 36.8 (17 Mar 2021 23:46)  T(F): 98.1 (18 Mar 2021 07:54), Max: 98.3 (17 Mar 2021 23:46)  HR: 86 (18 Mar 2021 07:54) (47 - 86)  BP: 137/70 (18 Mar 2021 07:54) (116/58 - 146/76)  BP(mean): --  RR: 18 (18 Mar 2021 07:54) (17 - 18)  SpO2: 90% (18 Mar 2021 08:38) (90% - 98%)    PHYSICAL EXAMINATION:  GENERAL: NAD, well built  HEAD:  Atraumatic, Normocephalic  EYES:  conjunctiva and sclera clear  NECK: Supple, No JVD, Normal thyroid  CHEST/LUNG: scant course breath sounds in b/l lung fields. equal air entry. No rales,  wheezing, or rubs  HEART: Regular rate and rhythm; No murmurs, rubs, or gallops  ABDOMEN: Soft, Nontender, Nondistended; Bowel sounds present  NERVOUS SYSTEM:  Alert & Oriented X3  EXTREMITIES:  2+ Peripheral Pulses, No clubbing, cyanosis, or edema  SKIN: warm dry                          12.8   5.01  )-----------( 161      ( 18 Mar 2021 07:14 )             38.2     03-18    142  |  105  |  27<H>  ----------------------------<  131<H>  3.9   |  32<H>  |  0.69    Ca    8.6      18 Mar 2021 07:14  Phos  2.2     03-18  Mg     2.2     03-18    TPro  5.7<L>  /  Alb  2.4<L>  /  TBili  0.3  /  DBili  x   /  AST  28  /  ALT  31  /  AlkPhos  74  03-18    LIVER FUNCTIONS - ( 18 Mar 2021 07:14 )  Alb: 2.4 g/dL / Pro: 5.7 g/dL / ALK PHOS: 74 U/L / ALT: 31 U/L DA / AST: 28 U/L / GGT: x           CARDIAC MARKERS ( 16 Mar 2021 13:17 )  0.056 ng/mL / x     / x     / x     / x          I&O's Summary    18 Mar 2021 07:01  -  18 Mar 2021 09:31  --------------------------------------------------------  IN: 220 mL / OUT: 0 mL / NET: 220 mL        CAPILLARY BLOOD GLUCOSE  POCT Blood Glucose.: 153 mg/dL (18 Mar 2021 07:45)    CAPILLARY BLOOD GLUCOSE  POCT Blood Glucose.: 153 mg/dL (18 Mar 2021 07:45)  POCT Blood Glucose.: 172 mg/dL (17 Mar 2021 22:21)  POCT Blood Glucose.: 239 mg/dL (17 Mar 2021 16:52)  POCT Blood Glucose.: 307 mg/dL (17 Mar 2021 13:18)      RADIOLOGY & ADDITIONAL TESTS:                   PGY-1 Progress Note discussed with attending    PAGER #: [[phone]] TILL 5:00 PM  PLEASE CONTACT ON CALL TEAM:   - On Call Team (Please refer to Toni) FROM 5:00 PM - 8:30PM  - Nightfloat Team FROM 8:30 -7:30 AM    CHIEF COMPLAINT & BRIEF HOSPITAL COURSE:  86 year old male with a PMH of HTN, DM who presented with a chief complaint of fever, cough and sob. COVID PCR+/ Ab neg.  D-dimer 1557. CT neg for PE. . Trop up to 0.06 max 2/2 demand ischemia. Echo ordered. Rem/Dex since 3/15. Stable on 2L NC 97% > desaturates on room air 88% sent for home O2 on 3/17. Inflammatory markers trending down. O2 requirements decreasing. Pt eval, SW eval as pt lives at home. GOC Full Code.     INTERVAL HPI/OVERNIGHT EVENTS:    Surendra [id number]   Episodes of bradycardia on tele, discontinued BB. Will call PMD today [phone]  Patient reports improved SOB but persistent dry cough. Given Robitussin Patient concerned about elevated blood sugars, was counseled on steroid medication effects on blood sugar.     REVIEW OF SYSTEMS:  CONSTITUTIONAL: No fever, weight loss, or fatigue  RESPIRATORY: + cough, + SOB improving. No wheezing, chills or hemoptysis;  CARDIOVASCULAR: No chest pain, palpitations, dizziness, or leg swelling  GASTROINTESTINAL: No abdominal pain. No nausea, vomiting, or hematemesis; No diarrhea or constipation. No melena or hematochezia.  GENITOURINARY: No dysuria or hematuria, urinary frequency  NEUROLOGICAL: No headaches, memory loss, loss of strength, numbness, or tremors  SKIN: No itching, burning, rashes, or lesions     MEDICATIONS  (STANDING):  ALBUTerol    90 MICROgram(s) HFA Inhaler 2 Puff(s) Inhalation every 6 hours  aspirin  chewable 81 milliGRAM(s) Oral daily  dexAMETHasone  Injectable 6 milliGRAM(s) IV Push daily  enoxaparin Injectable 40 milliGRAM(s) SubCutaneous daily  ferrous    sulfate 325 milliGRAM(s) Oral daily  insulin lispro (ADMELOG) corrective regimen sliding scale   SubCutaneous Before meals and at bedtime  metoprolol tartrate 12.5 milliGRAM(s) Oral two times a day  mirtazapine 15 milliGRAM(s) Oral at bedtime  pantoprazole    Tablet 40 milliGRAM(s) Oral before breakfast  potassium phosphate / sodium phosphate Powder (PHOS-NaK) 1 Packet(s) Oral every 3 hours  remdesivir  IVPB   IV Intermittent   remdesivir  IVPB 100 milliGRAM(s) IV Intermittent every 24 hours  simvastatin 20 milliGRAM(s) Oral at bedtime    MEDICATIONS  (PRN):  acetaminophen   Tablet .. 650 milliGRAM(s) Oral every 4 hours PRN Temp greater or equal to 38.5C (101.3F)  benzonatate 100 milliGRAM(s) Oral three times a day PRN Cough  guaifenesin/dextromethorphan  Syrup 10 milliLiter(s) Oral every 4 hours PRN Cough      Vital Signs Last 24 Hrs  T(C): 36.7 (18 Mar 2021 07:54), Max: 36.8 (17 Mar 2021 23:46)  T(F): 98.1 (18 Mar 2021 07:54), Max: 98.3 (17 Mar 2021 23:46)  HR: 86 (18 Mar 2021 07:54) (47 - 86)  BP: 137/70 (18 Mar 2021 07:54) (116/58 - 146/76)  BP(mean): --  RR: 18 (18 Mar 2021 07:54) (17 - 18)  SpO2: 90% (18 Mar 2021 08:38) (90% - 98%)    PHYSICAL EXAMINATION:  GENERAL: NAD, well built  HEAD:  Atraumatic, Normocephalic  EYES:  conjunctiva and sclera clear  NECK: Supple, No JVD, Normal thyroid  CHEST/LUNG: scant course breath sounds in b/l lung fields. equal air entry. No rales,  wheezing, or rubs  HEART: Regular rate and rhythm; No murmurs, rubs, or gallops  ABDOMEN: Soft, Nontender, Nondistended; Bowel sounds present  NERVOUS SYSTEM:  Alert & Oriented X3  EXTREMITIES:  2+ Peripheral Pulses, No clubbing, cyanosis, or edema  SKIN: warm dry                          12.8   5.01  )-----------( 161      ( 18 Mar 2021 07:14 )             38.2     03-18    142  |  105  |  27<H>  ----------------------------<  131<H>  3.9   |  32<H>  |  0.69    Ca    8.6      18 Mar 2021 07:14  Phos  2.2     03-18  Mg     2.2     03-18    TPro  5.7<L>  /  Alb  2.4<L>  /  TBili  0.3  /  DBili  x   /  AST  28  /  ALT  31  /  AlkPhos  74  03-18    LIVER FUNCTIONS - ( 18 Mar 2021 07:14 )  Alb: 2.4 g/dL / Pro: 5.7 g/dL / ALK PHOS: 74 U/L / ALT: 31 U/L DA / AST: 28 U/L / GGT: x           CARDIAC MARKERS ( 16 Mar 2021 13:17 )  0.056 ng/mL / x     / x     / x     / x          I&O's Summary    18 Mar 2021 07:01  -  18 Mar 2021 09:31  --------------------------------------------------------  IN: 220 mL / OUT: 0 mL / NET: 220 mL        CAPILLARY BLOOD GLUCOSE  POCT Blood Glucose.: 153 mg/dL (18 Mar 2021 07:45)    CAPILLARY BLOOD GLUCOSE  POCT Blood Glucose.: 153 mg/dL (18 Mar 2021 07:45)  POCT Blood Glucose.: 172 mg/dL (17 Mar 2021 22:21)  POCT Blood Glucose.: 239 mg/dL (17 Mar 2021 16:52)  POCT Blood Glucose.: 307 mg/dL (17 Mar 2021 13:18)      RADIOLOGY & ADDITIONAL TESTS:                   PGY-1 Progress Note discussed with attending    PAGER #: [[phone]] TILL 5:00 PM  PLEASE CONTACT ON CALL TEAM:   - On Call Team (Please refer to Toni) FROM 5:00 PM - 8:30PM  - Nightfloat Team FROM 8:30 -7:30 AM    CHIEF COMPLAINT & BRIEF HOSPITAL COURSE:  86 year old male with a PMH of HTN, DM who presented with a chief complaint of fever, cough and sob. COVID PCR+/ Ab neg.  D-dimer 1557. CT neg for PE. . Trop up to 0.06 max 2/2 demand ischemia. Echo ordered. Rem/Dex since 3/15. Stable on 2L NC 97% > desaturates on room air 88% sent for home O2 on 3/17. Inflammatory markers trending down. O2 requirements decreasing. Pt eval, SW eval as pt lives at home. GOC Full Code.     INTERVAL HPI/OVERNIGHT EVENTS:    Surendra [id number]   Episodes of bradycardia on tele, discontinued BB. Will call PMD today [phone]  Patient reports improved SOB but persistent dry cough. Given Robitussin Patient concerned about elevated blood sugars, was counseled on steroid medication effects on blood sugar.     REVIEW OF SYSTEMS:  CONSTITUTIONAL: No fever, weight loss, or fatigue  RESPIRATORY: + cough, + SOB improving. No wheezing, chills or hemoptysis;  CARDIOVASCULAR: No chest pain, palpitations, dizziness, or leg swelling  GASTROINTESTINAL: No abdominal pain. No nausea, vomiting, or hematemesis; No diarrhea or constipation. No melena or hematochezia.  GENITOURINARY: No dysuria or hematuria, urinary frequency  NEUROLOGICAL: No headaches, memory loss, loss of strength, numbness, or tremors  SKIN: No itching, burning, rashes, or lesions     MEDICATIONS  (STANDING):  ALBUTerol    90 MICROgram(s) HFA Inhaler 2 Puff(s) Inhalation every 6 hours  aspirin  chewable 81 milliGRAM(s) Oral daily  dexAMETHasone  Injectable 6 milliGRAM(s) IV Push daily  enoxaparin Injectable 40 milliGRAM(s) SubCutaneous daily  ferrous    sulfate 325 milliGRAM(s) Oral daily  insulin lispro (ADMELOG) corrective regimen sliding scale   SubCutaneous Before meals and at bedtime  metoprolol tartrate 12.5 milliGRAM(s) Oral two times a day  mirtazapine 15 milliGRAM(s) Oral at bedtime  pantoprazole    Tablet 40 milliGRAM(s) Oral before breakfast  potassium phosphate / sodium phosphate Powder (PHOS-NaK) 1 Packet(s) Oral every 3 hours  remdesivir  IVPB   IV Intermittent   remdesivir  IVPB 100 milliGRAM(s) IV Intermittent every 24 hours  simvastatin 20 milliGRAM(s) Oral at bedtime    MEDICATIONS  (PRN):  acetaminophen   Tablet .. 650 milliGRAM(s) Oral every 4 hours PRN Temp greater or equal to 38.5C (101.3F)  benzonatate 100 milliGRAM(s) Oral three times a day PRN Cough  guaifenesin/dextromethorphan  Syrup 10 milliLiter(s) Oral every 4 hours PRN Cough      Vital Signs Last 24 Hrs  T(C): 36.7 (18 Mar 2021 07:54), Max: 36.8 (17 Mar 2021 23:46)  T(F): 98.1 (18 Mar 2021 07:54), Max: 98.3 (17 Mar 2021 23:46)  HR: 86 (18 Mar 2021 07:54) (47 - 86)  BP: 137/70 (18 Mar 2021 07:54) (116/58 - 146/76)  BP(mean): --  RR: 18 (18 Mar 2021 07:54) (17 - 18)  SpO2: 90% (18 Mar 2021 08:38) (90% - 98%)    PHYSICAL EXAMINATION:  GENERAL: NAD, well built  HEAD:  Atraumatic, Normocephalic  EYES:  conjunctiva and sclera clear  NECK: Supple, No JVD, Normal thyroid  CHEST/LUNG: scant course breath sounds in b/l lung fields. equal air entry. No rales,  wheezing, or rubs  HEART: Regular rate and rhythm; No murmurs, rubs, or gallops  ABDOMEN: Soft, Nontender, Nondistended; Bowel sounds present  NERVOUS SYSTEM:  Alert & Oriented X3  EXTREMITIES:  2+ Peripheral Pulses, No clubbing, cyanosis, or edema  SKIN: warm dry                          12.8   5.01  )-----------( 161      ( 18 Mar 2021 07:14 )             38.2     03-18    142  |  105  |  27<H>  ----------------------------<  131<H>  3.9   |  32<H>  |  0.69    Ca    8.6      18 Mar 2021 07:14  Phos  2.2     03-18  Mg     2.2     03-18    TPro  5.7<L>  /  Alb  2.4<L>  /  TBili  0.3  /  DBili  x   /  AST  28  /  ALT  31  /  AlkPhos  74  03-18    LIVER FUNCTIONS - ( 18 Mar 2021 07:14 )  Alb: 2.4 g/dL / Pro: 5.7 g/dL / ALK PHOS: 74 U/L / ALT: 31 U/L DA / AST: 28 U/L / GGT: x           CARDIAC MARKERS ( 16 Mar 2021 13:17 )  0.056 ng/mL / x     / x     / x     / x          I&O's Summary    18 Mar 2021 07:01  -  18 Mar 2021 09:31  --------------------------------------------------------  IN: 220 mL / OUT: 0 mL / NET: 220 mL        CAPILLARY BLOOD GLUCOSE  POCT Blood Glucose.: 153 mg/dL (18 Mar 2021 07:45)    CAPILLARY BLOOD GLUCOSE  POCT Blood Glucose.: 153 mg/dL (18 Mar 2021 07:45)  POCT Blood Glucose.: 172 mg/dL (17 Mar 2021 22:21)  POCT Blood Glucose.: 239 mg/dL (17 Mar 2021 16:52)  POCT Blood Glucose.: 307 mg/dL (17 Mar 2021 13:18)

## 2021-03-18 NOTE — PROGRESS NOTE ADULT - PROBLEM SELECTOR PLAN 1
c/w Remdesivir, Dexamethasone since 3/15  Continue with albuterol, antitussive  titrating O2 down goal SpO2 92% and above  Monitor oxygenation  Airborne/contact isolation  currently on 2L   qualified for home O2 88% on RA - sent for home O2 c/w Remdesivir, Dexamethasone since 3/15  Continue with albuterol, antitussive  titrating O2 down goal SpO2 92% and above  Monitor oxygenation  Airborne/contact isolation  currently on 2L   qualified for home O2 88% on RA - sent for home O2  3/19 last day Remdesivir

## 2021-03-18 NOTE — CHART NOTE - NSCHARTNOTEFT_GEN_A_CORE
Patient's PCP Dr. Hira Briones [phone] called and discussed patient's bradycardia seen here on telemetry. Prior EKGs showed resting HR in low 60's to mid 50's. Metoprolol succ 50 QD was started by Cardiologist Dr. Forrest Butler [facility] [phone]. Remeron is prescribed for the patient's insomnia concerns, no history of dementia.

## 2021-03-18 NOTE — PROGRESS NOTE ADULT - ATTENDING COMMENTS
Patient was seen and examined in ED  [employer]  used [id number]  Reports SOB has improved since came to hospital     Vitals noted     P/E:  NAD   AAOx3, No focal deficit   Lungs scattered crepitations BL  S1S2 WNL  Abd soft and non tender, BS present   BL LE no edema or calf tenderness     Labs noted     A/P:  Acute hypoxic respiratory failure due to COVID PNA  NSTEMI  HTN  DM    Plan:  - Oxygen supplementation as needed; keep saturation >92%  - continue Remdesevir and Dexamethasone   - COVID Ab neg  - Monitor LFTs and renal function daily  - monitor inflammatory markers: ESR, CRP, Procalcitonin, LDH, Ferritin and D-dimer q2-3 days   - Lovenox ppx as per BMI and GFR  - HbA1c   - ISS  - Isolation precautions for COVID-19 per infection control protocol  - continue with current HTN management  - continue with DM management; check FS while on steroid  - Full code  - Patient wants to appoint his girlfriend as HCP
Patient seen and examined.  Case discussed with house staff.  Agree with above as edited.   Interviewed via Bulgarian  ID #[id number]  Patient is a 86yoM with h/o HTN and DM type II a/w acute hypoxic respiratory failure 2/2 COVID-19  Acute hypoxic respiratory failure 2/2 COVID-19 - on suppl O2 via NC, decadron, Remdesivr.  Patient is saturating 88 % on room at at rest. Will require O2  Tropinemia - likely demand ischemia - on ASA, statin. On tele. Trops are now downtrending. No CP   HTN - holding ARB for now. Monitor BP. c/w metoprolol  HLD - c/w statin  Hypophosphatemia - Replete with KPhos and monitor.   Case d/w Case management, social work and nursing on IDRs
Patient seen and examined.  Case discussed with house staff.  Agree with above as edited.   Interviewed via Nauruan  ID #[id number]  Patient is a 86yoM with h/o HTN and DM type II a/w acute hypoxic respiratory failure 2/2 COVID-19  Acute hypoxic respiratory failure 2/2 COVID-19 - on suppl O2 via NC, decadron, Remdesivr.  Patient with documented saturation of 88 % on room at at rest. Will require O2  Bradycardia over night - metoprolol held. Sinus patricia. Asymptomatic. d/w patient  Tropinemia - likely demand ischemia - on ASA, statin. On tele. Trops are now downtrending. No CP   HTN - holding ARB for now. Monitor BP. c/w metoprolol  HLD - c/w statin  Hypophosphatemia - Replete with KPhos and monitor.   Case d/w Case management, social work and nursing on IDRs  Likely d/c in 1-2 days  Family has been updated

## 2021-03-19 ENCOUNTER — TRANSCRIPTION ENCOUNTER (OUTPATIENT)
Age: 86
End: 2021-03-19

## 2021-03-19 VITALS
SYSTOLIC BLOOD PRESSURE: 146 MMHG | RESPIRATION RATE: 18 BRPM | HEART RATE: 77 BPM | DIASTOLIC BLOOD PRESSURE: 87 MMHG | TEMPERATURE: 98 F | OXYGEN SATURATION: 96 %

## 2021-03-19 LAB
ANION GAP SERPL CALC-SCNC: 5 MMOL/L — SIGNIFICANT CHANGE UP (ref 5–17)
BUN SERPL-MCNC: 22 MG/DL — HIGH (ref 7–18)
CALCIUM SERPL-MCNC: 8.1 MG/DL — LOW (ref 8.4–10.5)
CHLORIDE SERPL-SCNC: 106 MMOL/L — SIGNIFICANT CHANGE UP (ref 96–108)
CO2 SERPL-SCNC: 31 MMOL/L — SIGNIFICANT CHANGE UP (ref 22–31)
CREAT SERPL-MCNC: 0.63 MG/DL — SIGNIFICANT CHANGE UP (ref 0.5–1.3)
CRP SERPL-MCNC: 25 MG/L — HIGH
D DIMER BLD IA.RAPID-MCNC: 302 NG/ML DDU — HIGH
FERRITIN SERPL-MCNC: 641 NG/ML — HIGH (ref 30–400)
GLUCOSE BLDC GLUCOMTR-MCNC: 153 MG/DL — HIGH (ref 70–99)
GLUCOSE BLDC GLUCOMTR-MCNC: 169 MG/DL — HIGH (ref 70–99)
GLUCOSE BLDC GLUCOMTR-MCNC: 191 MG/DL — HIGH (ref 70–99)
GLUCOSE SERPL-MCNC: 118 MG/DL — HIGH (ref 70–99)
HCT VFR BLD CALC: 36.8 % — LOW (ref 39–50)
HGB BLD-MCNC: 12.1 G/DL — LOW (ref 13–17)
LDH SERPL L TO P-CCNC: 225 U/L — SIGNIFICANT CHANGE UP (ref 120–225)
MAGNESIUM SERPL-MCNC: 2 MG/DL — SIGNIFICANT CHANGE UP (ref 1.6–2.6)
MCHC RBC-ENTMCNC: 30.7 PG — SIGNIFICANT CHANGE UP (ref 27–34)
MCHC RBC-ENTMCNC: 32.9 GM/DL — SIGNIFICANT CHANGE UP (ref 32–36)
MCV RBC AUTO: 93.4 FL — SIGNIFICANT CHANGE UP (ref 80–100)
NRBC # BLD: 0 /100 WBCS — SIGNIFICANT CHANGE UP (ref 0–0)
PHOSPHATE SERPL-MCNC: 2.2 MG/DL — LOW (ref 2.5–4.5)
PLATELET # BLD AUTO: 162 K/UL — SIGNIFICANT CHANGE UP (ref 150–400)
POTASSIUM SERPL-MCNC: 3.9 MMOL/L — SIGNIFICANT CHANGE UP (ref 3.5–5.3)
POTASSIUM SERPL-SCNC: 3.9 MMOL/L — SIGNIFICANT CHANGE UP (ref 3.5–5.3)
RBC # BLD: 3.94 M/UL — LOW (ref 4.2–5.8)
RBC # FLD: 12 % — SIGNIFICANT CHANGE UP (ref 10.3–14.5)
SODIUM SERPL-SCNC: 142 MMOL/L — SIGNIFICANT CHANGE UP (ref 135–145)
WBC # BLD: 4.66 K/UL — SIGNIFICANT CHANGE UP (ref 3.8–10.5)
WBC # FLD AUTO: 4.66 K/UL — SIGNIFICANT CHANGE UP (ref 3.8–10.5)

## 2021-03-19 PROCEDURE — 93010 ELECTROCARDIOGRAM REPORT: CPT

## 2021-03-19 PROCEDURE — 99239 HOSP IP/OBS DSCHRG MGMT >30: CPT | Mod: CS

## 2021-03-19 RX ORDER — RIVAROXABAN 15 MG-20MG
1 KIT ORAL
Qty: 30 | Refills: 0
Start: 2021-03-19 | End: 2021-04-17

## 2021-03-19 RX ORDER — LOSARTAN POTASSIUM 100 MG/1
50 TABLET, FILM COATED ORAL DAILY
Refills: 0 | Status: DISCONTINUED | OUTPATIENT
Start: 2021-03-19 | End: 2021-03-19

## 2021-03-19 RX ORDER — METOPROLOL TARTRATE 50 MG
1 TABLET ORAL
Qty: 0 | Refills: 0 | DISCHARGE

## 2021-03-19 RX ORDER — GUAIFENESIN/DEXTROMETHORPHAN 600MG-30MG
10 TABLET, EXTENDED RELEASE 12 HR ORAL
Qty: 900 | Refills: 0
Start: 2021-03-19 | End: 2021-04-17

## 2021-03-19 RX ORDER — DEXAMETHASONE 0.5 MG/5ML
1 ELIXIR ORAL
Qty: 5 | Refills: 0
Start: 2021-03-19 | End: 2021-03-23

## 2021-03-19 RX ORDER — FERROUS SULFATE 325(65) MG
1 TABLET ORAL
Qty: 30 | Refills: 0
Start: 2021-03-19 | End: 2021-04-17

## 2021-03-19 RX ORDER — ALBUTEROL 90 UG/1
2 AEROSOL, METERED ORAL
Qty: 1 | Refills: 0
Start: 2021-03-19 | End: 2021-04-17

## 2021-03-19 RX ADMIN — Medication 1: at 11:53

## 2021-03-19 RX ADMIN — ALBUTEROL 2 PUFF(S): 90 AEROSOL, METERED ORAL at 08:08

## 2021-03-19 RX ADMIN — ENOXAPARIN SODIUM 40 MILLIGRAM(S): 100 INJECTION SUBCUTANEOUS at 11:42

## 2021-03-19 RX ADMIN — ALBUTEROL 2 PUFF(S): 90 AEROSOL, METERED ORAL at 06:10

## 2021-03-19 RX ADMIN — Medication 6 MILLIGRAM(S): at 06:11

## 2021-03-19 RX ADMIN — PANTOPRAZOLE SODIUM 40 MILLIGRAM(S): 20 TABLET, DELAYED RELEASE ORAL at 06:11

## 2021-03-19 RX ADMIN — Medication 2 UNIT(S): at 08:08

## 2021-03-19 RX ADMIN — Medication 325 MILLIGRAM(S): at 11:42

## 2021-03-19 RX ADMIN — Medication 10 MILLILITER(S): at 06:11

## 2021-03-19 RX ADMIN — REMDESIVIR 500 MILLIGRAM(S): 5 INJECTION INTRAVENOUS at 06:38

## 2021-03-19 RX ADMIN — LOSARTAN POTASSIUM 50 MILLIGRAM(S): 100 TABLET, FILM COATED ORAL at 08:53

## 2021-03-19 RX ADMIN — Medication 100 MILLIGRAM(S): at 08:39

## 2021-03-19 RX ADMIN — Medication 2 UNIT(S): at 11:52

## 2021-03-19 RX ADMIN — Medication 81 MILLIGRAM(S): at 11:42

## 2021-03-19 RX ADMIN — Medication 1: at 08:07

## 2021-03-19 NOTE — DISCHARGE NOTE NURSING/CASE MANAGEMENT/SOCIAL WORK - PATIENT PORTAL LINK FT
You can access the FollowMyHealth Patient Portal offered by [facility] by registering at the following website: [email or web address]. By joining [employer]’s FollowMyHealth portal, you will also be able to view your health information using other applications (apps) compatible with our system.

## 2021-03-19 NOTE — CHART NOTE - NSCHARTNOTEFT_GEN_A_CORE
Patient seen and examined.  Case discussed with house staff.  Patient being discharged today.  Interviewed via Moroccan  ID #[id number]  See full discharge summary.  Briefly, patient is a 86yoM with h/o HTN and DM type II a/w acute hypoxic respiratory failure 2/2 COVID-19  Acute hypoxic respiratory failure 2/2 COVID-19 - on suppl O2 via NC, decadron. s/p Remdesivr.  Patient with documented saturation of 88 % on room at at rest. Will require O2  Tropinemia - likely demand ischemia - on ASA, statin. On tele. Trops downtrended. No CP   HTN - holding ARB for now. Monitor BP. c/w metoprolol  HLD - c/w statin  Case d/w Case management, social work and nursing on IDRs  d/w wife over the phone  D/C time: 40 minutes

## 2021-03-27 ENCOUNTER — INPATIENT (INPATIENT)
Facility: HOSPITAL | Age: 86
LOS: 3 days | Discharge: HOME | DRG: 177 | End: 2021-03-31
Attending: HOSPITALIST | Admitting: HOSPITALIST
Payer: MEDICARE

## 2021-03-27 VITALS
HEIGHT: 66 IN | WEIGHT: 154.98 LBS | HEART RATE: 92 BPM | DIASTOLIC BLOOD PRESSURE: 84 MMHG | OXYGEN SATURATION: 93 % | SYSTOLIC BLOOD PRESSURE: 121 MMHG | RESPIRATION RATE: 18 BRPM | TEMPERATURE: 98 F

## 2021-03-27 DIAGNOSIS — Z98.890 OTHER SPECIFIED POSTPROCEDURAL STATES: Chronic | ICD-10-CM

## 2021-03-27 DIAGNOSIS — R55 SYNCOPE AND COLLAPSE: ICD-10-CM

## 2021-03-27 LAB
ACETONE SERPL-MCNC: NEGATIVE — SIGNIFICANT CHANGE UP
ALBUMIN SERPL ELPH-MCNC: 2.3 G/DL — LOW (ref 3.5–5)
ALP SERPL-CCNC: 79 U/L — SIGNIFICANT CHANGE UP (ref 40–120)
ALT FLD-CCNC: 27 U/L DA — SIGNIFICANT CHANGE UP (ref 10–60)
ANION GAP SERPL CALC-SCNC: 8 MMOL/L — SIGNIFICANT CHANGE UP (ref 5–17)
AST SERPL-CCNC: 16 U/L — SIGNIFICANT CHANGE UP (ref 10–40)
BASOPHILS # BLD AUTO: 0.02 K/UL — SIGNIFICANT CHANGE UP (ref 0–0.2)
BASOPHILS NFR BLD AUTO: 0.3 % — SIGNIFICANT CHANGE UP (ref 0–2)
BILIRUB SERPL-MCNC: 0.8 MG/DL — SIGNIFICANT CHANGE UP (ref 0.2–1.2)
BUN SERPL-MCNC: 21 MG/DL — HIGH (ref 7–18)
CALCIUM SERPL-MCNC: 8.4 MG/DL — SIGNIFICANT CHANGE UP (ref 8.4–10.5)
CHLORIDE SERPL-SCNC: 103 MMOL/L — SIGNIFICANT CHANGE UP (ref 96–108)
CK SERPL-CCNC: 34 U/L — LOW (ref 35–232)
CO2 SERPL-SCNC: 28 MMOL/L — SIGNIFICANT CHANGE UP (ref 22–31)
CREAT SERPL-MCNC: 0.81 MG/DL — SIGNIFICANT CHANGE UP (ref 0.5–1.3)
EOSINOPHIL # BLD AUTO: 0.02 K/UL — SIGNIFICANT CHANGE UP (ref 0–0.5)
EOSINOPHIL NFR BLD AUTO: 0.3 % — SIGNIFICANT CHANGE UP (ref 0–6)
GLUCOSE SERPL-MCNC: 142 MG/DL — HIGH (ref 70–99)
HCT VFR BLD CALC: 38.1 % — LOW (ref 39–50)
HGB BLD-MCNC: 12.7 G/DL — LOW (ref 13–17)
IMM GRANULOCYTES NFR BLD AUTO: 1.2 % — SIGNIFICANT CHANGE UP (ref 0–1.5)
LACTATE SERPL-SCNC: 1.2 MMOL/L — SIGNIFICANT CHANGE UP (ref 0.7–2)
LYMPHOCYTES # BLD AUTO: 0.39 K/UL — LOW (ref 1–3.3)
LYMPHOCYTES # BLD AUTO: 5.6 % — LOW (ref 13–44)
MAGNESIUM SERPL-MCNC: 1.9 MG/DL — SIGNIFICANT CHANGE UP (ref 1.6–2.6)
MCHC RBC-ENTMCNC: 30 PG — SIGNIFICANT CHANGE UP (ref 27–34)
MCHC RBC-ENTMCNC: 33.3 GM/DL — SIGNIFICANT CHANGE UP (ref 32–36)
MCV RBC AUTO: 90.1 FL — SIGNIFICANT CHANGE UP (ref 80–100)
MONOCYTES # BLD AUTO: 0.41 K/UL — SIGNIFICANT CHANGE UP (ref 0–0.9)
MONOCYTES NFR BLD AUTO: 5.9 % — SIGNIFICANT CHANGE UP (ref 2–14)
NEUTROPHILS # BLD AUTO: 6.02 K/UL — SIGNIFICANT CHANGE UP (ref 1.8–7.4)
NEUTROPHILS NFR BLD AUTO: 86.7 % — HIGH (ref 43–77)
NRBC # BLD: 0 /100 WBCS — SIGNIFICANT CHANGE UP (ref 0–0)
NT-PROBNP SERPL-SCNC: 224 PG/ML — SIGNIFICANT CHANGE UP (ref 0–450)
PLATELET # BLD AUTO: 227 K/UL — SIGNIFICANT CHANGE UP (ref 150–400)
POTASSIUM SERPL-MCNC: 3.5 MMOL/L — SIGNIFICANT CHANGE UP (ref 3.5–5.3)
POTASSIUM SERPL-SCNC: 3.5 MMOL/L — SIGNIFICANT CHANGE UP (ref 3.5–5.3)
PROT SERPL-MCNC: 6.3 G/DL — SIGNIFICANT CHANGE UP (ref 6–8.3)
RBC # BLD: 4.23 M/UL — SIGNIFICANT CHANGE UP (ref 4.2–5.8)
RBC # FLD: 11.9 % — SIGNIFICANT CHANGE UP (ref 10.3–14.5)
SARS-COV-2 RNA SPEC QL NAA+PROBE: DETECTED
SODIUM SERPL-SCNC: 139 MMOL/L — SIGNIFICANT CHANGE UP (ref 135–145)
TROPONIN I SERPL-MCNC: 0.05 NG/ML — HIGH (ref 0–0.04)
WBC # BLD: 6.94 K/UL — SIGNIFICANT CHANGE UP (ref 3.8–10.5)
WBC # FLD AUTO: 6.94 K/UL — SIGNIFICANT CHANGE UP (ref 3.8–10.5)

## 2021-03-27 PROCEDURE — 71275 CT ANGIOGRAPHY CHEST: CPT | Mod: 26

## 2021-03-27 PROCEDURE — 70450 CT HEAD/BRAIN W/O DYE: CPT | Mod: 26,MA

## 2021-03-27 PROCEDURE — 99285 EMERGENCY DEPT VISIT HI MDM: CPT | Mod: CS

## 2021-03-27 PROCEDURE — 71045 X-RAY EXAM CHEST 1 VIEW: CPT | Mod: 26

## 2021-03-27 PROCEDURE — 99223 1ST HOSP IP/OBS HIGH 75: CPT | Mod: CS,AI

## 2021-03-27 RX ORDER — GUAIFENESIN/DEXTROMETHORPHAN 600MG-30MG
10 TABLET, EXTENDED RELEASE 12 HR ORAL THREE TIMES A DAY
Refills: 0 | Status: DISCONTINUED | OUTPATIENT
Start: 2021-03-27 | End: 2021-03-31

## 2021-03-27 RX ORDER — HYDROCHLOROTHIAZIDE 25 MG
25 TABLET ORAL DAILY
Refills: 0 | Status: DISCONTINUED | OUTPATIENT
Start: 2021-03-27 | End: 2021-03-31

## 2021-03-27 RX ORDER — ALBUTEROL 90 UG/1
2 AEROSOL, METERED ORAL EVERY 6 HOURS
Refills: 0 | Status: DISCONTINUED | OUTPATIENT
Start: 2021-03-27 | End: 2021-03-31

## 2021-03-27 RX ORDER — PANTOPRAZOLE SODIUM 20 MG/1
40 TABLET, DELAYED RELEASE ORAL
Refills: 0 | Status: DISCONTINUED | OUTPATIENT
Start: 2021-03-27 | End: 2021-03-31

## 2021-03-27 RX ORDER — LOSARTAN POTASSIUM 100 MG/1
100 TABLET, FILM COATED ORAL DAILY
Refills: 0 | Status: DISCONTINUED | OUTPATIENT
Start: 2021-03-27 | End: 2021-03-31

## 2021-03-27 RX ORDER — FERROUS SULFATE 325(65) MG
325 TABLET ORAL DAILY
Refills: 0 | Status: DISCONTINUED | OUTPATIENT
Start: 2021-03-27 | End: 2021-03-31

## 2021-03-27 RX ORDER — DEXAMETHASONE 0.5 MG/5ML
4 ELIXIR ORAL DAILY
Refills: 0 | Status: DISCONTINUED | OUTPATIENT
Start: 2021-03-27 | End: 2021-03-28

## 2021-03-27 RX ORDER — ENOXAPARIN SODIUM 100 MG/ML
40 INJECTION SUBCUTANEOUS DAILY
Refills: 0 | Status: DISCONTINUED | OUTPATIENT
Start: 2021-03-27 | End: 2021-03-31

## 2021-03-27 RX ORDER — MIRTAZAPINE 45 MG/1
15 TABLET, ORALLY DISINTEGRATING ORAL AT BEDTIME
Refills: 0 | Status: DISCONTINUED | OUTPATIENT
Start: 2021-03-27 | End: 2021-03-31

## 2021-03-27 RX ORDER — ASPIRIN/CALCIUM CARB/MAGNESIUM 324 MG
81 TABLET ORAL DAILY
Refills: 0 | Status: DISCONTINUED | OUTPATIENT
Start: 2021-03-27 | End: 2021-03-31

## 2021-03-27 RX ORDER — SODIUM CHLORIDE 9 MG/ML
1000 INJECTION INTRAMUSCULAR; INTRAVENOUS; SUBCUTANEOUS
Refills: 0 | Status: DISCONTINUED | OUTPATIENT
Start: 2021-03-27 | End: 2021-03-28

## 2021-03-27 RX ORDER — SIMVASTATIN 20 MG/1
20 TABLET, FILM COATED ORAL AT BEDTIME
Refills: 0 | Status: DISCONTINUED | OUTPATIENT
Start: 2021-03-27 | End: 2021-03-31

## 2021-03-27 NOTE — H&P ADULT - NSHPREVIEWOFSYSTEMS_GEN_ALL_CORE
CONSTITUTIONAL: No weakness, fevers or chills  EYES/ENT: No visual changes;  No vertigo or throat pain   NECK: No pain or stiffness  RESPIRATORY: cough, No wheezing, hemoptysis; shortness of breath  CARDIOVASCULAR: No chest pain or palpitations  GASTROINTESTINAL: No abdominal or epigastric pain. No nausea, vomiting, or hematemesis; No diarrhea or constipation. No melena or hematochezia.  GENITOURINARY: No dysuria, frequency or hematuria  NEUROLOGICAL: No numbness or weakness  SKIN: No itching, burning, rashes, or lesions   All other review of systems is negative unless indicated above.

## 2021-03-27 NOTE — H&P ADULT - PROBLEM SELECTOR PLAN 2
Patient is Covid positive from March 15,   Finished Remdesevir and Dexamethasone  Not compliant with Oxygen  Will start on supplemental Oxygen NC 2L as he is desaturating to 89% on RA on rest   CXR shows worsening or infiltrate in periphery and B/L pleural effusion on CT  No PE   Will start on Dexamethasone again   Albuterol PRN ,   f/u covid markers   Pulm consult in AM Patient is Covid positive from March 15,   Finished Remdesevir and Dexamethasone  Not compliant with Oxygen  Will start on supplemental Oxygen NC 2L as he is desaturating to 89% on RA on rest   CXR shows worsening or infiltrate in periphery and B/L pleural effusion on CT  No PE   Will start on Dexamethasone again   Albuterol PRN ,   f/u covid markers   Pulm consult Dr donovan

## 2021-03-27 NOTE — ED PROVIDER NOTE - CLINICAL SUMMARY MEDICAL DECISION MAKING FREE TEXT BOX
pt was recently Dx with COVID, now told with pl. eff., will get CXR, labs, pt also c/o dizziness, & syncope 2 days, ago, will get CT head, reassess

## 2021-03-27 NOTE — H&P ADULT - HISTORY OF PRESENT ILLNESS
86 y.o. male with NIDDM, Covid positive recently discharged is coming to hospital as he was told that he fluids in his lungs. Patient was recently discharged home with home oxygen, however he reports that he cannot use oxygen all the time as the equipment is heavy.   Patient also reports that he had a fall few days ago while he was walking to the kitchen (w/o Oxygen). Patient remembers feeling dizzy at that time.   On physical examination, patient has a vertical scab on the crown surface which he does not know about.    Pt also complains of generalized weakness, chills, dizziness when he gets up, and has chest pain with coughing.    ED Course:   Desaturates on room air, put on NC 3 L  Troponin: 0.050  Vital Signs Last 24 Hrs  T(C): 36.5 (28 Mar 2021 00:12), Max: 37.2 (27 Mar 2021 16:54)  T(F): 97.7 (28 Mar 2021 00:12), Max: 99 (27 Mar 2021 16:54)  HR: 76 (28 Mar 2021 00:12) (76 - 92)  BP: 102/61 (28 Mar 2021 00:12) (100/65 - 121/84)  RR: 20 (28 Mar 2021 00:12) (18 - 20)  SpO2: 95% (28 Mar 2021 00:12) (93% - 96%)

## 2021-03-27 NOTE — H&P ADULT - PROBLEM SELECTOR PLAN 3
Troponin 0.50, Likely demand ischemia ,  Was elevated even on last admission  Monitor on tele floor for now

## 2021-03-27 NOTE — H&P ADULT - ATTENDING COMMENTS
Patient is an 86 year old male with a PMH of HTN, DM, h/o COVID Infection 3/15/2021 who was BIBEMS due to shortness of breath.  (Com[place]an  ID: [id number])  Patient states that subsequent to his discharge from [facility] on 3/19/2021, he has been experiencing intermittent shortness of breath, particularly "when I take a big breath".  Additionally, patient endorses that he recently had an chest XR performed for which he was informed that he has a "pleural effusion" for which he needed to return to the ED.    Additionally of note, patient endorses that he "was on the floor" several days prior to current presentation, though could not elucidate further information.    T(C): 36.6 (03-27-21 @ 19:00), Max: 37.2 (03-27-21 @ 16:54)  T(F): 97.9 (03-27-21 @ 19:00), Max: 99 (03-27-21 @ 16:54)  HR: 85 (03-27-21 @ 19:00) (85 - 92)  BP: 100/65 (03-27-21 @ 19:00) (100/65 - 121/84)  RR: 20 (03-27-21 @ 19:00) (18 - 20)  SpO2: 95% (03-27-21 @ 19:00) (93% - 96%)  Wt(kg): --    P/E: As above MAR    A/P:    Shortness of Breath due to Non-Severe COVID Infection:  -COVID= Detected  -Chest film, though portable and slightly rotated, appears to demonstrate hazy densities throughout the lateral-most aspects of the bilateral lung fields which are increased in comparison to prior chest film on 3/15/2021  -At time of examination in the ED, patient is not requiring supplemental oxygen.  Therefore, patient can be categorized as Non-Severe Disease.    -Will start patient on Lovenox  -Will also start patient on Albuterol MDI Q2H PRN  -Will continue to trend d-dimer, CRP, LDH, Troponin, Ferritin, CPK  -Tylenol PRN for fever  -Will provide supplemental oxygen via NC if necessary  -Will continue to provide patient with any and all additional supportive care as necessary    ?Unwitnessed Syncope:  -CT Head identified no hemorrhage, cortical edema, mass effect or hydrocephalus.  There is a lucent lesion extending through the calvarium in the left frontal region. It measures 1.9 cm in long axis dimension with well-defined somewhat serpiginous margins. The axial projection  -EGSYS Score= 0 points, Cardiac syncope less likely, 3% mortality at 21-24 months for EGSYS <3  -CK level= 34  -Will obtain Orthostatic Vital Signs  -Will admit to Telemetry for continuous cardiac monitoring  -Will send Prolactin level  -TTE    Troponinemia likely due to Type 2 MI:  -Troponin= 0.050 (Baseline= 0.056 on 3/16/2021)  -Will admit patient to Telemetry for continuous cardiac monitoring  -Will continue to trend troponin with simultaneous acquisition of EKG    HTN:  -Will resume patient's home medication  -Vital Signs Q4H    DM:  -Hemoglobin A1c= 6.7% on 3/16/2021  -Blood Glucose Monitoring ACHS  -Regular Insulin Sliding Scale ACHS  -Carb Controlled, Heart Healthy, Low Sodium diet as tolerated    Hypoalbuminemia:  -Nutrition Consult    GI/DVT PPx:  -Pepcid  -Lovenox Patient is an 86 year old male with a PMH of HTN, DM, h/o COVID Infection 3/15/2021 who was BIBEMS due to shortness of breath.  (Congolese  ID: [id number])  Patient states that subsequent to his discharge from [facility] on 3/19/2021, he has been experiencing intermittent shortness of breath, particularly "when I take a big breath".  Additionally, patient endorses that he recently had an chest XR performed for which he was informed that he has a "pleural effusion" for which he needed to return to the ED.    Additionally of note, patient endorses that he "was on the floor" several days prior to current presentation, though could not elucidate further information.    T(C): 36.6 (03-27-21 @ 19:00), Max: 37.2 (03-27-21 @ 16:54)  T(F): 97.9 (03-27-21 @ 19:00), Max: 99 (03-27-21 @ 16:54)  HR: 85 (03-27-21 @ 19:00) (85 - 92)  BP: 100/65 (03-27-21 @ 19:00) (100/65 - 121/84)  RR: 20 (03-27-21 @ 19:00) (18 - 20)  SpO2: 95% (03-27-21 @ 19:00) (93% - 96%)  Wt(kg): --    P/E: As above MAR    A/P:    Shortness of Breath due to Severe COVID Infection:  -COVID= Detected  -Chest film, though portable and slightly rotated, appears to demonstrate hazy densities throughout the lateral-most aspects of the bilateral lung fields which are increased in comparison to prior chest film on 3/15/2021  -At time of examination in the ED, patient is not requiring supplemental oxygen.  Therefore, patient can be categorized as Non-Severe Disease.    -Will start patient on Lovenox  -Will also start patient on Albuterol MDI Q2H PRN  -Will continue to trend d-dimer, CRP, LDH, Troponin, Ferritin, CPK  -Tylenol PRN for fever  -Will continue patient on supplemental oxygen via 2L O2 via NC  -Will continue to provide patient with any and all additional supportive care as necessary    ?Unwitnessed Syncope:  -CT Head identified no hemorrhage, cortical edema, mass effect or hydrocephalus.  There is a lucent lesion extending through the calvarium in the left frontal region. It measures 1.9 cm in long axis dimension with well-defined somewhat serpiginous margins. The axial projection  -EGSYS Score= 0 points, Cardiac syncope less likely, 3% mortality at 21-24 months for EGSYS <3  -CK level= 34  -Will obtain Orthostatic Vital Signs  -Will admit to Telemetry for continuous cardiac monitoring  -Will send Prolactin level  -TTE    Troponinemia likely due to Type 2 MI:  -Troponin= 0.050 (Baseline= 0.056 on 3/16/2021)  -Will admit patient to Telemetry for continuous cardiac monitoring  -Will continue to trend troponin with simultaneous acquisition of EKG    HTN:  -Will resume patient's home medication  -Vital Signs Q4H    DM:  -Hemoglobin A1c= 6.7% on 3/16/2021  -Blood Glucose Monitoring ACHS  -Regular Insulin Sliding Scale ACHS  -Carb Controlled, Heart Healthy, Low Sodium diet as tolerated    Hypoalbuminemia:  -Nutrition Consult    GI/DVT PPx:  -Pepcid  -Lovenox Patient is an 86 year old male with a PMH of HTN, DM, h/o COVID Infection 3/15/2021 who was BIBEMS due to shortness of breath.  (Tur[place] Islander  ID: [id number])  Patient states that subsequent to his discharge from [facility] on 3/19/2021, he has been experiencing intermittent shortness of breath, particularly "when I take a big breath".  Additionally, patient endorses that he recently had an chest XR performed for which he was informed that he has a "pleural effusion" for which he needed to return to the ED.    Additionally of note, patient endorses that he "was on the floor" several days prior to current presentation, though could not elucidate further information.    T(C): 36.6 (03-27-21 @ 19:00), Max: 37.2 (03-27-21 @ 16:54)  T(F): 97.9 (03-27-21 @ 19:00), Max: 99 (03-27-21 @ 16:54)  HR: 85 (03-27-21 @ 19:00) (85 - 92)  BP: 100/65 (03-27-21 @ 19:00) (100/65 - 121/84)  RR: 20 (03-27-21 @ 19:00) (18 - 20)  SpO2: 95% (03-27-21 @ 19:00) (93% - 96%)  Wt(kg): --    P/E: As above MAR    A/P:    Shortness of Breath due to Severe COVID Infection:  -COVID= Detected  -Chest film, though portable and slightly rotated, appears to demonstrate hazy densities throughout the lateral-most aspects of the bilateral lung fields which are increased in comparison to prior chest film on 3/15/2021  -At time of examination in the ED, patient is not requiring supplemental oxygen.  Therefore, patient can be categorized as Non-Severe Disease.    -Will start patient on Lovenox  -In addition, will start patient on Dexamethasone 6mg IV Daily for 10 days or until discharge (whichever is shorter)  -Will also start patient on Albuterol MDI Q2H PRN  -Will send ESR, CRP, Procalcitonin  -Will continue to trend d-dimer, CRP, LDH, Troponin, Ferritin, CPK  -Tylenol PRN for fever  -Will continue patient on 2L O2 via NC  -Will continue to provide patient with any and all additional supportive care as necessary    ?Unwitnessed Syncope:  -CT Head identified no hemorrhage, cortical edema, mass effect or hydrocephalus.  There is a lucent lesion extending through the calvarium in the left frontal region. It measures 1.9 cm in long axis dimension with well-defined somewhat serpiginous margins. The axial projection  -EGSYS Score= 0 points, Cardiac syncope less likely, 3% mortality at 21-24 months for EGSYS <3  -CK level= 34  -Will obtain Orthostatic Vital Signs  -Will admit to Telemetry for continuous cardiac monitoring  -Will send Prolactin level  -TTE    Troponinemia likely due to Type 2 MI:  -Troponin= 0.050 (Baseline= 0.056 on 3/16/2021)  -Will admit patient to Telemetry for continuous cardiac monitoring  -Will continue to trend troponin with simultaneous acquisition of EKG    HTN:  -Will resume patient's home medication  -Vital Signs Q4H    DM:  -Hemoglobin A1c= 6.7% on 3/16/2021  -Blood Glucose Monitoring ACHS  -Regular Insulin Sliding Scale ACHS  -Carb Controlled, Heart Healthy, Low Sodium diet as tolerated    Hypoalbuminemia:  -Nutrition Consult    GI/DVT PPx:  -Pepcid  -Lovenox

## 2021-03-27 NOTE — H&P ADULT - NSHPPHYSICALEXAM_GEN_ALL_CORE
PHYSICAL EXAM:  GENERAL: NAD, speaks in full sentences, no signs of respiratory distress  HEAD:  Atraumatic, Normocephalic  EYES: EOMI, PERRLA, conjunctiva and sclera clear  NECK: Supple, No JVD  CHEST/LUNG: Clear to auscultation bilaterally; No wheeze; No crackles; No accessory muscles used  HEART: Regular rate and rhythm; No murmurs;   ABDOMEN: Soft, Nontender, Nondistended; Bowel sounds present; No guarding  EXTREMITIES:  2+ Peripheral Pulses, No cyanosis or edema  PSYCH: AAOx3  NEUROLOGY: non-focal  SKIN: Vertical dry scab on scalp

## 2021-03-27 NOTE — ED PROVIDER NOTE - OBJECTIVE STATEMENT
[id number] 86 y.o. male with h/o NIDDM, last dose this am, BIBA pt claims on 3/15 was admitted to  for COVID, d/c home on 3/19 with home O2.  Pt was f/u with house call doctor, CXR done-told pt with pl. eff. & sent to ED.  Pt with c/o chills, dizziness when he gets up, dry coughing, no appetite, weakness.  Pt states he found himself on the floor 2-3 days ago, denies any injury.

## 2021-03-27 NOTE — ED PROVIDER NOTE - CONSTITUTIONAL, MLM
vyvanse refill request. Last seen by pcp 5/7/18, last refill in epic 4/24/18 and next appt 11/12/18. PCP is out of the office till June 4th.   - - -

## 2021-03-27 NOTE — H&P ADULT - PROBLEM SELECTOR PLAN 1
Patient reporting fall during effort few days ago while was walking w/.o oxygen  Associated with dizziness, cannot exactly recall the episode  patient was recently discharged with home oxygen , non compliant   No chest pain, No LOC  -EKG: NSR with rate of 800  -On last admission, patient was found to be bradycardiac  EGSYS : 3 Likely cardiogenic or hypoxia induced?  -Admit to Tele floor,   - f/u Echo   - Supplemental O2 Patient reporting fall during effort few days ago while was walking w/.o oxygen  Associated with dizziness, cannot exactly recall the episode  patient was recently discharged with home oxygen , non compliant   No chest pain, No LOC  -EKG: NSR with rate of 80  -On last admission, patient was found to be bradycardiac  EGSYS : 3 Likely cardiogenic or hypoxia induced?  -Admit to Tele floor,   - f/u Echo   - Supplemental O2

## 2021-03-28 DIAGNOSIS — Z29.9 ENCOUNTER FOR PROPHYLACTIC MEASURES, UNSPECIFIED: ICD-10-CM

## 2021-03-28 DIAGNOSIS — U07.1 COVID-19: ICD-10-CM

## 2021-03-28 DIAGNOSIS — I10 ESSENTIAL (PRIMARY) HYPERTENSION: ICD-10-CM

## 2021-03-28 DIAGNOSIS — R55 SYNCOPE AND COLLAPSE: ICD-10-CM

## 2021-03-28 DIAGNOSIS — E11.9 TYPE 2 DIABETES MELLITUS WITHOUT COMPLICATIONS: ICD-10-CM

## 2021-03-28 DIAGNOSIS — I24.8 OTHER FORMS OF ACUTE ISCHEMIC HEART DISEASE: ICD-10-CM

## 2021-03-28 LAB
ALBUMIN SERPL ELPH-MCNC: 2.2 G/DL — LOW (ref 3.5–5)
ALP SERPL-CCNC: 78 U/L — SIGNIFICANT CHANGE UP (ref 40–120)
ALT FLD-CCNC: 26 U/L DA — SIGNIFICANT CHANGE UP (ref 10–60)
ANION GAP SERPL CALC-SCNC: 8 MMOL/L — SIGNIFICANT CHANGE UP (ref 5–17)
AST SERPL-CCNC: 15 U/L — SIGNIFICANT CHANGE UP (ref 10–40)
BASOPHILS # BLD AUTO: 0.01 K/UL — SIGNIFICANT CHANGE UP (ref 0–0.2)
BASOPHILS NFR BLD AUTO: 0.2 % — SIGNIFICANT CHANGE UP (ref 0–2)
BILIRUB SERPL-MCNC: 0.8 MG/DL — SIGNIFICANT CHANGE UP (ref 0.2–1.2)
BUN SERPL-MCNC: 17 MG/DL — SIGNIFICANT CHANGE UP (ref 7–18)
CALCIUM SERPL-MCNC: 8.6 MG/DL — SIGNIFICANT CHANGE UP (ref 8.4–10.5)
CHLORIDE SERPL-SCNC: 104 MMOL/L — SIGNIFICANT CHANGE UP (ref 96–108)
CO2 SERPL-SCNC: 27 MMOL/L — SIGNIFICANT CHANGE UP (ref 22–31)
COVID-19 SPIKE DOMAIN AB INTERP: POSITIVE
COVID-19 SPIKE DOMAIN ANTIBODY RESULT: 4 U/ML — HIGH
CREAT SERPL-MCNC: 0.72 MG/DL — SIGNIFICANT CHANGE UP (ref 0.5–1.3)
CRP SERPL-MCNC: 76 MG/L — HIGH
EOSINOPHIL # BLD AUTO: 0.02 K/UL — SIGNIFICANT CHANGE UP (ref 0–0.5)
EOSINOPHIL NFR BLD AUTO: 0.3 % — SIGNIFICANT CHANGE UP (ref 0–6)
GLUCOSE BLDC GLUCOMTR-MCNC: 208 MG/DL — HIGH (ref 70–99)
GLUCOSE BLDC GLUCOMTR-MCNC: 213 MG/DL — HIGH (ref 70–99)
GLUCOSE SERPL-MCNC: 179 MG/DL — HIGH (ref 70–99)
HCT VFR BLD CALC: 37.2 % — LOW (ref 39–50)
HGB BLD-MCNC: 12.5 G/DL — LOW (ref 13–17)
IMM GRANULOCYTES NFR BLD AUTO: 1 % — SIGNIFICANT CHANGE UP (ref 0–1.5)
LDH SERPL L TO P-CCNC: 214 U/L — SIGNIFICANT CHANGE UP (ref 120–225)
LYMPHOCYTES # BLD AUTO: 0.32 K/UL — LOW (ref 1–3.3)
LYMPHOCYTES # BLD AUTO: 5.1 % — LOW (ref 13–44)
MAGNESIUM SERPL-MCNC: 2.2 MG/DL — SIGNIFICANT CHANGE UP (ref 1.6–2.6)
MCHC RBC-ENTMCNC: 30.6 PG — SIGNIFICANT CHANGE UP (ref 27–34)
MCHC RBC-ENTMCNC: 33.6 GM/DL — SIGNIFICANT CHANGE UP (ref 32–36)
MCV RBC AUTO: 91 FL — SIGNIFICANT CHANGE UP (ref 80–100)
MONOCYTES # BLD AUTO: 0.16 K/UL — SIGNIFICANT CHANGE UP (ref 0–0.9)
MONOCYTES NFR BLD AUTO: 2.6 % — SIGNIFICANT CHANGE UP (ref 2–14)
NEUTROPHILS # BLD AUTO: 5.69 K/UL — SIGNIFICANT CHANGE UP (ref 1.8–7.4)
NEUTROPHILS NFR BLD AUTO: 90.8 % — HIGH (ref 43–77)
NRBC # BLD: 0 /100 WBCS — SIGNIFICANT CHANGE UP (ref 0–0)
PHOSPHATE SERPL-MCNC: 2.2 MG/DL — LOW (ref 2.5–4.5)
PLATELET # BLD AUTO: 231 K/UL — SIGNIFICANT CHANGE UP (ref 150–400)
POTASSIUM SERPL-MCNC: 3.5 MMOL/L — SIGNIFICANT CHANGE UP (ref 3.5–5.3)
POTASSIUM SERPL-SCNC: 3.5 MMOL/L — SIGNIFICANT CHANGE UP (ref 3.5–5.3)
PROCALCITONIN SERPL-MCNC: 0.04 NG/ML — SIGNIFICANT CHANGE UP (ref 0.02–0.1)
PROT SERPL-MCNC: 6.2 G/DL — SIGNIFICANT CHANGE UP (ref 6–8.3)
RBC # BLD: 4.09 M/UL — LOW (ref 4.2–5.8)
RBC # FLD: 12 % — SIGNIFICANT CHANGE UP (ref 10.3–14.5)
SARS-COV-2 IGG+IGM SERPL QL IA: 4 U/ML — HIGH
SARS-COV-2 IGG+IGM SERPL QL IA: POSITIVE
SODIUM SERPL-SCNC: 139 MMOL/L — SIGNIFICANT CHANGE UP (ref 135–145)
TROPONIN I SERPL-MCNC: 0.04 NG/ML — SIGNIFICANT CHANGE UP (ref 0–0.04)
WBC # BLD: 6.26 K/UL — SIGNIFICANT CHANGE UP (ref 3.8–10.5)
WBC # FLD AUTO: 6.26 K/UL — SIGNIFICANT CHANGE UP (ref 3.8–10.5)

## 2021-03-28 PROCEDURE — 99233 SBSQ HOSP IP/OBS HIGH 50: CPT | Mod: GC

## 2021-03-28 RX ORDER — INSULIN LISPRO 100/ML
VIAL (ML) SUBCUTANEOUS
Refills: 0 | Status: DISCONTINUED | OUTPATIENT
Start: 2021-03-28 | End: 2021-03-30

## 2021-03-28 RX ORDER — POTASSIUM PHOSPHATE, MONOBASIC POTASSIUM PHOSPHATE, DIBASIC 236; 224 MG/ML; MG/ML
15 INJECTION, SOLUTION INTRAVENOUS ONCE
Refills: 0 | Status: COMPLETED | OUTPATIENT
Start: 2021-03-28 | End: 2021-03-28

## 2021-03-28 RX ORDER — SODIUM CHLORIDE 9 MG/ML
1000 INJECTION INTRAMUSCULAR; INTRAVENOUS; SUBCUTANEOUS
Refills: 0 | Status: DISCONTINUED | OUTPATIENT
Start: 2021-03-28 | End: 2021-03-31

## 2021-03-28 RX ORDER — DEXAMETHASONE 0.5 MG/5ML
6 ELIXIR ORAL DAILY
Refills: 0 | Status: DISCONTINUED | OUTPATIENT
Start: 2021-03-28 | End: 2021-03-31

## 2021-03-28 RX ORDER — INSULIN LISPRO 100/ML
VIAL (ML) SUBCUTANEOUS AT BEDTIME
Refills: 0 | Status: DISCONTINUED | OUTPATIENT
Start: 2021-03-28 | End: 2021-03-30

## 2021-03-28 RX ADMIN — LOSARTAN POTASSIUM 100 MILLIGRAM(S): 100 TABLET, FILM COATED ORAL at 01:30

## 2021-03-28 RX ADMIN — Medication 1 DROP(S): at 14:10

## 2021-03-28 RX ADMIN — Medication 10 MILLILITER(S): at 05:03

## 2021-03-28 RX ADMIN — Medication 81 MILLIGRAM(S): at 01:30

## 2021-03-28 RX ADMIN — Medication 1 DROP(S): at 01:36

## 2021-03-28 RX ADMIN — Medication 4 MILLIGRAM(S): at 01:30

## 2021-03-28 RX ADMIN — Medication 325 MILLIGRAM(S): at 12:43

## 2021-03-28 RX ADMIN — SIMVASTATIN 20 MILLIGRAM(S): 20 TABLET, FILM COATED ORAL at 22:31

## 2021-03-28 RX ADMIN — Medication 1 DROP(S): at 07:21

## 2021-03-28 RX ADMIN — Medication 10 MILLILITER(S): at 22:31

## 2021-03-28 RX ADMIN — PANTOPRAZOLE SODIUM 40 MILLIGRAM(S): 20 TABLET, DELAYED RELEASE ORAL at 07:20

## 2021-03-28 RX ADMIN — POTASSIUM PHOSPHATE, MONOBASIC POTASSIUM PHOSPHATE, DIBASIC 62.5 MILLIMOLE(S): 236; 224 INJECTION, SOLUTION INTRAVENOUS at 12:42

## 2021-03-28 RX ADMIN — ENOXAPARIN SODIUM 40 MILLIGRAM(S): 100 INJECTION SUBCUTANEOUS at 12:42

## 2021-03-28 RX ADMIN — ALBUTEROL 2 PUFF(S): 90 AEROSOL, METERED ORAL at 05:04

## 2021-03-28 RX ADMIN — ENOXAPARIN SODIUM 40 MILLIGRAM(S): 100 INJECTION SUBCUTANEOUS at 01:29

## 2021-03-28 RX ADMIN — Medication 10 MILLILITER(S): at 01:31

## 2021-03-28 RX ADMIN — Medication 10 MILLILITER(S): at 14:10

## 2021-03-28 RX ADMIN — Medication 1 DROP(S): at 11:06

## 2021-03-28 RX ADMIN — SODIUM CHLORIDE 75 MILLILITER(S): 9 INJECTION INTRAMUSCULAR; INTRAVENOUS; SUBCUTANEOUS at 11:53

## 2021-03-28 RX ADMIN — Medication 25 MILLIGRAM(S): at 01:30

## 2021-03-28 RX ADMIN — Medication 4 MILLIGRAM(S): at 05:04

## 2021-03-28 RX ADMIN — MIRTAZAPINE 15 MILLIGRAM(S): 45 TABLET, ORALLY DISINTEGRATING ORAL at 22:31

## 2021-03-28 RX ADMIN — Medication 1 DROP(S): at 22:31

## 2021-03-28 RX ADMIN — LOSARTAN POTASSIUM 100 MILLIGRAM(S): 100 TABLET, FILM COATED ORAL at 05:04

## 2021-03-28 RX ADMIN — Medication 81 MILLIGRAM(S): at 12:48

## 2021-03-28 RX ADMIN — SIMVASTATIN 20 MILLIGRAM(S): 20 TABLET, FILM COATED ORAL at 01:30

## 2021-03-28 RX ADMIN — Medication 2: at 16:34

## 2021-03-28 RX ADMIN — PANTOPRAZOLE SODIUM 40 MILLIGRAM(S): 20 TABLET, DELAYED RELEASE ORAL at 01:31

## 2021-03-28 RX ADMIN — MIRTAZAPINE 15 MILLIGRAM(S): 45 TABLET, ORALLY DISINTEGRATING ORAL at 01:31

## 2021-03-28 RX ADMIN — Medication 25 MILLIGRAM(S): at 05:04

## 2021-03-28 RX ADMIN — Medication 325 MILLIGRAM(S): at 01:31

## 2021-03-28 RX ADMIN — Medication 1 DROP(S): at 17:21

## 2021-03-28 NOTE — PROGRESS NOTE ADULT - SUBJECTIVE AND OBJECTIVE BOX
PGY-1 Progress Note discussed with attending    PAGER #: [[phone]] TILL 5:00 PM  PLEASE CONTACT ON CALL TEAM:  - On Call Team (Please refer to Toni) FROM 5:00 PM - 8:30PM  - Nightfloat Team FROM 8:30 -7:30 AM    CHIEF COMPLAINT & BRIEF HOSPITAL COURSE:  86 y.o. male with NIDDM, Covid positive recently discharged is coming to hospital as he was told that he fluids in his lungs. Patient was recently discharged home with home oxygen, however he reports that he cannot use oxygen all the time as the equipment is heavy.   Patient also reports that he had a fall few days ago while he was walking to the kitchen (w/o Oxygen). Patient remembers feeling dizzy at that time.  Pt also complains of generalized weakness, chills, dizziness when he gets up, and has chest pain with coughing.    INTERVAL HPI/OVERNIGHT EVENTS:   Patient seen and examined at bedside. No events overnight.    REVIEW OF SYSTEMS:  CONSTITUTIONAL: No fever, weight loss, or fatigue  RESPIRATORY: No cough, wheezing, chills or hemoptysis; No shortness of breath  CARDIOVASCULAR: No chest pain, palpitations, dizziness, or leg swelling  GASTROINTESTINAL: No abdominal pain. No nausea, vomiting, or hematemesis; No diarrhea or constipation. No melena or hematochezia.  GENITOURINARY: No dysuria or hematuria, urinary frequency  MUSCULOSKELETAL: No pain, no Limited ROM  NEUROLOGICAL: No headaches, memory loss, loss of strength, numbness, or tremors  SKIN: No itching, burning, rashes, or lesions     MEDICATIONS  (STANDING):  aspirin  chewable 81 milliGRAM(s) Oral daily  carboxymethylcellulose 0.5% (Preservative-Free) Ophthalmic Solution 1 Drop(s) Both EYES every 4 hours  dexAMETHasone  Injectable 6 milliGRAM(s) IV Push daily  enoxaparin Injectable 40 milliGRAM(s) SubCutaneous daily  ferrous    sulfate 325 milliGRAM(s) Oral daily  guaifenesin/dextromethorphan  Syrup 10 milliLiter(s) Oral three times a day  hydrochlorothiazide 25 milliGRAM(s) Oral daily  losartan 100 milliGRAM(s) Oral daily  mirtazapine 15 milliGRAM(s) Oral at bedtime  pantoprazole    Tablet 40 milliGRAM(s) Oral before breakfast  simvastatin 20 milliGRAM(s) Oral at bedtime  sodium chloride 0.9%. 1000 milliLiter(s) (125 mL/Hr) IV Continuous <Continuous>    MEDICATIONS  (PRN):  ALBUTerol    90 MICROgram(s) HFA Inhaler 2 Puff(s) Inhalation every 6 hours PRN Shortness of Breath and/or Wheezing  benzonatate 100 milliGRAM(s) Oral three times a day PRN Cough      Vital Signs Last 24 Hrs  T(C): 36.4 (28 Mar 2021 07:36), Max: 37.2 (27 Mar 2021 16:54)  T(F): 97.6 (28 Mar 2021 07:36), Max: 99 (27 Mar 2021 16:54)  HR: 58 (28 Mar 2021 07:36) (58 - 92)  BP: 153/101 (28 Mar 2021 07:36) (100/65 - 153/101)  BP(mean): --  RR: 18 (28 Mar 2021 07:36) (18 - 20)  SpO2: 96% (28 Mar 2021 07:36) (93% - 99%)    PHYSICAL EXAMINATION:  GENERAL: NAD, well built  HEAD:  Atraumatic, Normocephalic  EYES:  conjunctiva and sclera clear, no scleral icterus  NECK: Supple, No JVD, Normal thyroid  CHEST/LUNG: Clear to auscultation.  No rales, rhonchi, wheezing, or rubs  HEART: Regular rate and rhythm; No murmurs, rubs, or gallops  ABDOMEN: Soft, Nontender, Nondistended; Bowel sounds present  NERVOUS SYSTEM:  Alert & Oriented X3,  Strength 5/5 in upper and lower extremities, sensation intact  EXTREMITIES:  2+ Peripheral Pulses, No clubbing, cyanosis, or edema  SKIN: warm dry, no lesions noted                          12.5   6.26  )-----------( 231      ( 28 Mar 2021 08:10 )             37.2     03-28    139  |  104  |  17  ----------------------------<  x   3.5   |  27  |  x     Ca    8.6      28 Mar 2021 08:10  Mg     2.2     03-28    TPro  x   /  Alb  2.2<L>  /  TBili  x   /  DBili  x   /  AST  x   /  ALT  x   /  AlkPhos  x   03-28    LIVER FUNCTIONS - ( 28 Mar 2021 08:10 )  Alb: 2.2 g/dL / Pro: x     / ALK PHOS: x     / ALT: x     / AST: x     / GGT: x           CARDIAC MARKERS ( 28 Mar 2021 08:10 )  0.044 ng/mL / x     / x     / x     / x      CARDIAC MARKERS ( 27 Mar 2021 17:57 )  0.050 ng/mL / x     / 34 U/L / x     / x            I&O's Summary        RADIOLOGY & ADDITIONAL TESTS:                   PGY-1 Progress Note discussed with attending    PAGER #: [[phone]] TILL 5:00 PM  PLEASE CONTACT ON CALL TEAM:  - On Call Team (Please refer to Toni) FROM 5:00 PM - 8:30PM  - Nightfloat Team FROM 8:30 -7:30 AM    CHIEF COMPLAINT & BRIEF HOSPITAL COURSE:  86 y.o. male with NIDDM, Covid positive recently discharged is coming to hospital as he was told that he fluids in his lungs. Patient was recently discharged home with home oxygen, however he reports that he cannot use oxygen all the time as the equipment is heavy.   Patient also reports that he had a fall few days ago while he was walking to the kitchen (w/o Oxygen). Patient remembers feeling dizzy at that time.  Pt also complains of generalized weakness, chills, dizziness when he gets up, and has chest pain with coughing.    INTERVAL HPI/OVERNIGHT EVENTS:   Patient seen and examined at bedside. No events overnight.  ID [id number], Patient denies any complains, is currently on 2L nc saturating around 94%.     REVIEW OF SYSTEMS:  CONSTITUTIONAL: No fever, weight loss, or fatigue  RESPIRATORY: No cough, wheezing, chills or hemoptysis; No shortness of breath  CARDIOVASCULAR: No chest pain, palpitations, dizziness, or leg swelling  GASTROINTESTINAL: No abdominal pain. No nausea, vomiting, or hematemesis; No diarrhea or constipation. No melena or hematochezia.  GENITOURINARY: No dysuria or hematuria, urinary frequency  MUSCULOSKELETAL: No pain, no Limited ROM  NEUROLOGICAL: No headaches, memory loss, loss of strength, numbness, or tremors  SKIN: No itching, burning, rashes, or lesions     MEDICATIONS  (STANDING):  aspirin  chewable 81 milliGRAM(s) Oral daily  carboxymethylcellulose 0.5% (Preservative-Free) Ophthalmic Solution 1 Drop(s) Both EYES every 4 hours  dexAMETHasone  Injectable 6 milliGRAM(s) IV Push daily  enoxaparin Injectable 40 milliGRAM(s) SubCutaneous daily  ferrous    sulfate 325 milliGRAM(s) Oral daily  guaifenesin/dextromethorphan  Syrup 10 milliLiter(s) Oral three times a day  hydrochlorothiazide 25 milliGRAM(s) Oral daily  losartan 100 milliGRAM(s) Oral daily  mirtazapine 15 milliGRAM(s) Oral at bedtime  pantoprazole    Tablet 40 milliGRAM(s) Oral before breakfast  simvastatin 20 milliGRAM(s) Oral at bedtime  sodium chloride 0.9%. 1000 milliLiter(s) (125 mL/Hr) IV Continuous <Continuous>    MEDICATIONS  (PRN):  ALBUTerol    90 MICROgram(s) HFA Inhaler 2 Puff(s) Inhalation every 6 hours PRN Shortness of Breath and/or Wheezing  benzonatate 100 milliGRAM(s) Oral three times a day PRN Cough      Vital Signs Last 24 Hrs  T(C): 36.4 (28 Mar 2021 07:36), Max: 37.2 (27 Mar 2021 16:54)  T(F): 97.6 (28 Mar 2021 07:36), Max: 99 (27 Mar 2021 16:54)  HR: 58 (28 Mar 2021 07:36) (58 - 92)  BP: 153/101 (28 Mar 2021 07:36) (100/65 - 153/101)  BP(mean): --  RR: 18 (28 Mar 2021 07:36) (18 - 20)  SpO2: 96% (28 Mar 2021 07:36) (93% - 99%)    PHYSICAL EXAMINATION:  GENERAL: NAD, well built,   HEAD:  Atraumatic, Normocephalic  EYES:  conjunctiva and sclera clear, no scleral icterus  NECK: Supple, No JVD, Normal thyroid  CHEST/LUNG: Rales bilaterally  HEART: Regular rate and rhythm; No murmurs, rubs, or gallops  ABDOMEN: Soft, Nontender, Nondistended; Bowel sounds present  NERVOUS SYSTEM:  Alert & Oriented X3,  Strength 5/5 in upper and lower extremities, sensation intact  EXTREMITIES:  2+ Peripheral Pulses, No clubbing, cyanosis, or edema  SKIN: warm dry, no lesions noted                          12.5   6.26  )-----------( 231      ( 28 Mar 2021 08:10 )             37.2     03-28    139  |  104  |  17  ----------------------------<  x   3.5   |  27  |  x     Ca    8.6      28 Mar 2021 08:10  Mg     2.2     03-28    TPro  x   /  Alb  2.2<L>  /  TBili  x   /  DBili  x   /  AST  x   /  ALT  x   /  AlkPhos  x   03-28    LIVER FUNCTIONS - ( 28 Mar 2021 08:10 )  Alb: 2.2 g/dL / Pro: x     / ALK PHOS: x     / ALT: x     / AST: x     / GGT: x           CARDIAC MARKERS ( 28 Mar 2021 08:10 )  0.044 ng/mL / x     / x     / x     / x      CARDIAC MARKERS ( 27 Mar 2021 17:57 )  0.050 ng/mL / x     / 34 U/L / x     / x            I&O's Summary        RADIOLOGY & ADDITIONAL TESTS:

## 2021-03-28 NOTE — PROGRESS NOTE ADULT - PROBLEM SELECTOR PLAN 1
Patient reporting fall during effort few days ago while was walking w/.o oxygen  Associated with dizziness, cannot exactly recall the episode  patient was recently discharged with home oxygen , non compliant   No chest pain, No LOC  -EKG: NSR with rate of 800  -On last admission, patient was found to be bradycardiac  EGSYS : 3 Likely cardiogenic or hypoxia induced?  -Admit to Tele floor,   - f/u Echo   - Supplemental O2 Patient reporting fall during effort few days ago while was walking w/.o oxygen  Associated with dizziness, cannot exactly recall the episode  patient was recently discharged with home oxygen , non compliant   No chest pain, No LOC  -EKG: NSR with rate of 80  -On last admission, patient was found to be bradycardiac  EGSYS : 3 Likely cardiogenic or hypoxia induced?  - monitor in telemetry  - f/u Echo   - Supplemental O2

## 2021-03-28 NOTE — PROGRESS NOTE ADULT - PROBLEM SELECTOR PLAN 2
Patient is Covid positive from March 15,   Finished Remdesevir and Dexamethasone  Not compliant with Oxygen  Will start on supplemental Oxygen NC 2L as he is desaturating to 89% on RA on rest   CXR shows worsening or infiltrate in periphery and B/L pleural effusion on CT  No PE   Will start on Dexamethasone again   Albuterol PRN ,   f/u covid markers   Pulm consult in AM Patient is Covid positive from March 15,   Finished Remdesevir and Dexamethasone on last admission  Not compliant with Oxygen  Continue supplemental oxygen, titrate as needed  CXR shows worsening or infiltrate in periphery and B/L pleural effusion on CT  No PE   Continue Dexamethasone  Albuterol PRN ,   f/u covid markers   Pulm consult : Dr. chapman

## 2021-03-29 PROBLEM — Z00.00 ENCOUNTER FOR PREVENTIVE HEALTH EXAMINATION: Status: ACTIVE | Noted: 2021-03-29

## 2021-03-29 LAB
ALBUMIN SERPL ELPH-MCNC: 2 G/DL — LOW (ref 3.5–5)
ALP SERPL-CCNC: 66 U/L — SIGNIFICANT CHANGE UP (ref 40–120)
ALT FLD-CCNC: 20 U/L DA — SIGNIFICANT CHANGE UP (ref 10–60)
ANION GAP SERPL CALC-SCNC: 7 MMOL/L — SIGNIFICANT CHANGE UP (ref 5–17)
AST SERPL-CCNC: 9 U/L — LOW (ref 10–40)
BILIRUB SERPL-MCNC: 0.5 MG/DL — SIGNIFICANT CHANGE UP (ref 0.2–1.2)
BUN SERPL-MCNC: 15 MG/DL — SIGNIFICANT CHANGE UP (ref 7–18)
CALCIUM SERPL-MCNC: 8.4 MG/DL — SIGNIFICANT CHANGE UP (ref 8.4–10.5)
CHLORIDE SERPL-SCNC: 106 MMOL/L — SIGNIFICANT CHANGE UP (ref 96–108)
CO2 SERPL-SCNC: 28 MMOL/L — SIGNIFICANT CHANGE UP (ref 22–31)
CREAT SERPL-MCNC: 0.7 MG/DL — SIGNIFICANT CHANGE UP (ref 0.5–1.3)
GLUCOSE BLDC GLUCOMTR-MCNC: 154 MG/DL — HIGH (ref 70–99)
GLUCOSE BLDC GLUCOMTR-MCNC: 171 MG/DL — HIGH (ref 70–99)
GLUCOSE BLDC GLUCOMTR-MCNC: 193 MG/DL — HIGH (ref 70–99)
GLUCOSE BLDC GLUCOMTR-MCNC: 198 MG/DL — HIGH (ref 70–99)
GLUCOSE SERPL-MCNC: 161 MG/DL — HIGH (ref 70–99)
HCT VFR BLD CALC: 34.8 % — LOW (ref 39–50)
HGB BLD-MCNC: 11.6 G/DL — LOW (ref 13–17)
MAGNESIUM SERPL-MCNC: 2.1 MG/DL — SIGNIFICANT CHANGE UP (ref 1.6–2.6)
MCHC RBC-ENTMCNC: 30.1 PG — SIGNIFICANT CHANGE UP (ref 27–34)
MCHC RBC-ENTMCNC: 33.3 GM/DL — SIGNIFICANT CHANGE UP (ref 32–36)
MCV RBC AUTO: 90.4 FL — SIGNIFICANT CHANGE UP (ref 80–100)
NRBC # BLD: 0 /100 WBCS — SIGNIFICANT CHANGE UP (ref 0–0)
PHOSPHATE SERPL-MCNC: 2.4 MG/DL — LOW (ref 2.5–4.5)
PLATELET # BLD AUTO: 194 K/UL — SIGNIFICANT CHANGE UP (ref 150–400)
POTASSIUM SERPL-MCNC: 3.2 MMOL/L — LOW (ref 3.5–5.3)
POTASSIUM SERPL-SCNC: 3.2 MMOL/L — LOW (ref 3.5–5.3)
PROT SERPL-MCNC: 5.7 G/DL — LOW (ref 6–8.3)
RBC # BLD: 3.85 M/UL — LOW (ref 4.2–5.8)
RBC # FLD: 11.7 % — SIGNIFICANT CHANGE UP (ref 10.3–14.5)
SODIUM SERPL-SCNC: 141 MMOL/L — SIGNIFICANT CHANGE UP (ref 135–145)
WBC # BLD: 8.23 K/UL — SIGNIFICANT CHANGE UP (ref 3.8–10.5)
WBC # FLD AUTO: 8.23 K/UL — SIGNIFICANT CHANGE UP (ref 3.8–10.5)

## 2021-03-29 PROCEDURE — 99233 SBSQ HOSP IP/OBS HIGH 50: CPT | Mod: CS,GC

## 2021-03-29 RX ORDER — BUDESONIDE AND FORMOTEROL FUMARATE DIHYDRATE 160; 4.5 UG/1; UG/1
2 AEROSOL RESPIRATORY (INHALATION)
Refills: 0 | Status: DISCONTINUED | OUTPATIENT
Start: 2021-03-29 | End: 2021-03-31

## 2021-03-29 RX ADMIN — MIRTAZAPINE 15 MILLIGRAM(S): 45 TABLET, ORALLY DISINTEGRATING ORAL at 22:31

## 2021-03-29 RX ADMIN — Medication 10 MILLILITER(S): at 22:31

## 2021-03-29 RX ADMIN — ENOXAPARIN SODIUM 40 MILLIGRAM(S): 100 INJECTION SUBCUTANEOUS at 12:12

## 2021-03-29 RX ADMIN — Medication 1: at 08:09

## 2021-03-29 RX ADMIN — PANTOPRAZOLE SODIUM 40 MILLIGRAM(S): 20 TABLET, DELAYED RELEASE ORAL at 05:15

## 2021-03-29 RX ADMIN — Medication 1: at 12:11

## 2021-03-29 RX ADMIN — Medication 325 MILLIGRAM(S): at 12:12

## 2021-03-29 RX ADMIN — Medication 1: at 17:24

## 2021-03-29 RX ADMIN — Medication 25 MILLIGRAM(S): at 05:15

## 2021-03-29 RX ADMIN — Medication 1 DROP(S): at 05:17

## 2021-03-29 RX ADMIN — Medication 1 DROP(S): at 09:08

## 2021-03-29 RX ADMIN — BUDESONIDE AND FORMOTEROL FUMARATE DIHYDRATE 2 PUFF(S): 160; 4.5 AEROSOL RESPIRATORY (INHALATION) at 22:31

## 2021-03-29 RX ADMIN — Medication 1 DROP(S): at 17:24

## 2021-03-29 RX ADMIN — Medication 1 DROP(S): at 13:29

## 2021-03-29 RX ADMIN — Medication 10 MILLILITER(S): at 05:23

## 2021-03-29 RX ADMIN — Medication 10 MILLILITER(S): at 13:28

## 2021-03-29 RX ADMIN — SIMVASTATIN 20 MILLIGRAM(S): 20 TABLET, FILM COATED ORAL at 22:31

## 2021-03-29 RX ADMIN — LOSARTAN POTASSIUM 100 MILLIGRAM(S): 100 TABLET, FILM COATED ORAL at 05:16

## 2021-03-29 RX ADMIN — Medication 1 DROP(S): at 22:32

## 2021-03-29 RX ADMIN — Medication 81 MILLIGRAM(S): at 12:12

## 2021-03-29 RX ADMIN — Medication 6 MILLIGRAM(S): at 05:16

## 2021-03-29 NOTE — PROGRESS NOTE ADULT - SUBJECTIVE AND OBJECTIVE BOX
PGY-1 Progress Note discussed with attending    PAGER #: [[phone]] TILL 5:00 PM  PLEASE CONTACT ON CALL TEAM:  - On Call Team (Please refer to Toni) FROM 5:00 PM - 8:30PM  - Nightfloat Team FROM 8:30 -7:30 AM    CHIEF COMPLAINT & BRIEF HOSPITAL COURSE:  86 y.o. male with NIDDM, Covid positive recently discharged is coming to hospital as he was told that he fluids in his lungs. Patient was recently discharged home with home oxygen, however he reports that he cannot use oxygen all the time as the equipment is heavy.   Patient also reports that he had a fall few days ago while he was walking to the kitchen (w/o Oxygen). Patient remembers feeling dizzy at that time.  Pt also complains of generalized weakness, chills, dizziness when he gets up, and has chest pain with coughing.    INTERVAL HPI/OVERNIGHT EVENTS:   Patient seen and examined at bedside. No events overnight. Patient is saturating well in 2L. complains of being connected to many wires and not being able to ambulate to the bathroom because of that. Denies any SOB cough, pains, abdominal discomfort.    REVIEW OF SYSTEMS:  CONSTITUTIONAL: No fever, weight loss, or fatigue  RESPIRATORY: No cough, wheezing, chills or hemoptysis; No shortness of breath  CARDIOVASCULAR: No chest pain, palpitations, dizziness, or leg swelling  GASTROINTESTINAL: No abdominal pain. No nausea, vomiting, or hematemesis; No diarrhea or constipation. No melena or hematochezia.   GENITOURINARY: No dysuria or hematuria, urinary frequency  MUSCULOSKELETAL: No pain, no Limited ROM  NEUROLOGICAL: No headaches, memory loss, loss of strength, numbness, or tremors  SKIN: No itching, burning, rashes, or lesions     MEDICATIONS  (STANDING):  aspirin  chewable 81 milliGRAM(s) Oral daily  budesonide 160 MICROgram(s)/formoterol 4.5 MICROgram(s) Inhaler 2 Puff(s) Inhalation two times a day  carboxymethylcellulose 0.5% (Preservative-Free) Ophthalmic Solution 1 Drop(s) Both EYES every 4 hours  dexAMETHasone  Injectable 6 milliGRAM(s) IV Push daily  enoxaparin Injectable 40 milliGRAM(s) SubCutaneous daily  ferrous    sulfate 325 milliGRAM(s) Oral daily  guaifenesin/dextromethorphan  Syrup 10 milliLiter(s) Oral three times a day  hydrochlorothiazide 25 milliGRAM(s) Oral daily  insulin lispro (ADMELOG) corrective regimen sliding scale   SubCutaneous three times a day before meals  insulin lispro (ADMELOG) corrective regimen sliding scale   SubCutaneous at bedtime  losartan 100 milliGRAM(s) Oral daily  mirtazapine 15 milliGRAM(s) Oral at bedtime  pantoprazole    Tablet 40 milliGRAM(s) Oral before breakfast  simvastatin 20 milliGRAM(s) Oral at bedtime  sodium chloride 0.9%. 1000 milliLiter(s) (75 mL/Hr) IV Continuous <Continuous>    MEDICATIONS  (PRN):  ALBUTerol    90 MICROgram(s) HFA Inhaler 2 Puff(s) Inhalation every 6 hours PRN Shortness of Breath and/or Wheezing  benzonatate 100 milliGRAM(s) Oral three times a day PRN Cough      Vital Signs Last 24 Hrs  T(C): 36.4 (29 Mar 2021 07:14), Max: 36.8 (28 Mar 2021 20:43)  T(F): 97.5 (29 Mar 2021 07:14), Max: 98.3 (28 Mar 2021 20:43)  HR: 62 (29 Mar 2021 04:30) (62 - 95)  BP: 163/68 (29 Mar 2021 04:30) (126/68 - 163/68)  BP(mean): --  RR: 18 (29 Mar 2021 07:14) (18 - 20)  SpO2: 95% (29 Mar 2021 07:14) (94% - 97%)    PHYSICAL EXAMINATION:  GENERAL: NAD, well built  HEAD:  Atraumatic, Normocephalic  EYES:  conjunctiva and sclera clear, no scleral icterus  NECK: Supple, No JVD, Normal thyroid  CHEST/LUNG: rales bilaterally  HEART: Regular rate and rhythm; No murmurs, rubs, or gallops  ABDOMEN: Soft, Nontender, Nondistended; Bowel sounds present  NERVOUS SYSTEM:  Alert & Oriented X3,  Strength 5/5 in upper and lower extremities, sensation intact  EXTREMITIES:  2+ Peripheral Pulses, No clubbing, cyanosis, or edema  SKIN: warm dry, no lesions noted                          11.6   8.23  )-----------( 194      ( 29 Mar 2021 08:25 )             34.8     03-29    141  |  106  |  15  ----------------------------<  161<H>  3.2<L>   |  28  |  0.70    Ca    8.4      29 Mar 2021 08:25  Phos  2.4     03-29  Mg     2.1     03-29    TPro  5.7<L>  /  Alb  2.0<L>  /  TBili  0.5  /  DBili  x   /  AST  9<L>  /  ALT  20  /  AlkPhos  66  03-29    LIVER FUNCTIONS - ( 29 Mar 2021 08:25 )  Alb: 2.0 g/dL / Pro: 5.7 g/dL / ALK PHOS: 66 U/L / ALT: 20 U/L DA / AST: 9 U/L / GGT: x           CARDIAC MARKERS ( 28 Mar 2021 08:10 )  0.044 ng/mL / x     / x     / x     / x      CARDIAC MARKERS ( 27 Mar 2021 17:57 )  0.050 ng/mL / x     / 34 U/L / x     / x            I&O's Summary    28 Mar 2021 07:01  -  29 Mar 2021 07:00  --------------------------------------------------------  IN: 1075 mL / OUT: 1300 mL / NET: -225 mL        Culture - Blood (collected 27 Mar 2021 21:06)  Source: .Blood Blood  Preliminary Report (28 Mar 2021 22:02):    No growth to date.    Culture - Blood (collected 27 Mar 2021 21:06)  Source: .Blood Blood  Preliminary Report (28 Mar 2021 22:02):    No growth to date.        RADIOLOGY & ADDITIONAL TESTS:

## 2021-03-29 NOTE — CONSULT NOTE ADULT - SUBJECTIVE AND OBJECTIVE BOX
PULMONARY CONSULT NOTE      BRYAN LARA            Source of Hx -- Chart and Wife  MRN-[id number]    Patient is a 86y old  Male who presents with a chief complaint of Covid + Syncope (28 Mar 2021 08:45)  Hx chart lab and xrays reviewed, Pt admitted 3/15 to 3/18 with covid  Pt examined and wife called and explained Dx and Tmt plan      History of Present Illness:   86 y.o. male with NIDDM, Covid positive recently discharged is coming to hospital as he was told that he fluids in his lungs. Patient was recently discharged home with home oxygen, however he reports that he cannot use oxygen all the time as the equipment is heavy.   Patient also reports that he had a fall few days ago while he was walking to the kitchen (w/o Oxygen). Patient remembers feeling dizzy at that time.   On physical examination, patient has a vertical scab on the crown surface which he does not know about.    Pt also complains of generalized weakness, chills, dizziness when he gets up, and has chest pain with coughi    Home Medications:  aspirin 81 mg oral tablet: 1 tab(s) orally once a day (27 Mar 2021 23:00)  carboxymethylcellulose-Na hyaluronate: 1 drop in each eye 4 times a day (27 Mar 2021 23:00)  hydroCHLOROthiazide 25 mg oral tablet: 1 tab(s) orally once a day (27 Mar 2021 23:00)  Januvia 100 mg oral tablet: 1 tab(s) orally once a day (27 Mar 2021 23:00)  metFORMIN 500 mg oral tablet: 1 tab(s) orally once a day (27 Mar 2021 23:00)  mirtazapine 15 mg oral tablet: 1 tab(s) orally once a day (at bedtime) (27 Mar 2021 23:00)  olmesartan 40 mg oral tablet: 1 tab(s) orally once a day (27 Mar 2021 23:00)  pantoprazole 40 mg oral delayed release tablet: 1 tab(s) orally once a day (27 Mar 2021 23:00)  Zocor 20 mg oral tablet: 1 tab(s) orally once a day (at bedtime) (27 Mar 2021 23:00)      MEDICATIONS  (STANDING):  aspirin  chewable 81 milliGRAM(s) Oral daily  carboxymethylcellulose 0.5% (Preservative-Free) Ophthalmic Solution 1 Drop(s) Both EYES every 4 hours  dexAMETHasone  Injectable 6 milliGRAM(s) IV Push daily  enoxaparin Injectable 40 milliGRAM(s) SubCutaneous daily  ferrous    sulfate 325 milliGRAM(s) Oral daily  guaifenesin/dextromethorphan  Syrup 10 milliLiter(s) Oral three times a day  hydrochlorothiazide 25 milliGRAM(s) Oral daily  insulin lispro (ADMELOG) corrective regimen sliding scale   SubCutaneous three times a day before meals  insulin lispro (ADMELOG) corrective regimen sliding scale   SubCutaneous at bedtime  losartan 100 milliGRAM(s) Oral daily  mirtazapine 15 milliGRAM(s) Oral at bedtime  pantoprazole    Tablet 40 milliGRAM(s) Oral before breakfast  simvastatin 20 milliGRAM(s) Oral at bedtime  sodium chloride 0.9%. 1000 milliLiter(s) (75 mL/Hr) IV Continuous <Continuous>    Allergies    No Known Allergies          PAST MEDICAL & SURGICAL HISTORY:  Hypercholesterolemia  HTN (hypertension)  Diabetes mellitus  History of prostate surgery  S/P hernia repair        FAMILY HISTORY:  HTN --   DM +  IHD --  Asthma -- COPD --    SOCIAL HISTORY SMOKING ex smoker x 19 yrs    ETOH --    DRUGS--  Lives with wife      REVIEW OF SYSTEMS:  CONSTITUTIONAL: No fever, weight loss, or fatigue   EYES: No eye pain, visual disturbances, or discharge  ENT:  No difficulty hearing, tinnitus, vertigo; No sinus or throat pain  NECK: No pain or stiffness   RESPIRATORY:  cough +  wheezing--   chills--   hemoptysis--    Shortness of Breath--  CARDIOVASCULAR: No chest pain, palpitations, passing but dizziness+, No  leg swelling  GASTROINTESTINAL: No abdominal or epigastric pain. No nausea, vomiting,  NEUROLOGICAL: No headaches, memory loss, loss of strength, numbness, or tremors  SKIN: No itching, burning, rashes, or lesions   LYMPH Nodes: No enlarged glands  ENDOCRINE: No heat or cold intolerance; No hair loss  MUSCULOSKELETAL: No joint pain or swelling; No muscle, back, or extremity pain  PSYCHIATRIC: No depression, anxiety, mood swings, or difficulty sleeping  HEME/LYMPH: No easy bruising, or bleeding gums  ALLERGY AND IMMUNOLOGIC: No hives or eczema      Vital Signs Last 24 Hrs  T(C): 36.4 (29 Mar 2021 07:14), Max: 36.8 (28 Mar 2021 20:43)  T(F): 97.5 (29 Mar 2021 07:14), Max: 98.3 (28 Mar 2021 20:43)  HR: 62 (29 Mar 2021 04:30) (62 - 95)  BP: 163/68 (29 Mar 2021 04:30) (126/68 - 163/68)  BP(mean): --  RR: 18 (29 Mar 2021 07:14) (18 - 20)  SpO2: 95% (29 Mar 2021 07:14) (94% - 97%)  I&O's Detail    28 Mar 2021 07:01  -  29 Mar 2021 07:00  --------------------------------------------------------  IN:    IV PiggyBack: 250 mL    sodium chloride 0.9%: 825 mL  Total IN: 1075 mL    OUT:    Voided (mL): 1300 mL  Total OUT: 1300 mL    Total NET: -225 mL          PHYSICAL EXAMINATION:    GENERAL: The patient is a well-developed, well-nourished in no apparent distress.   SKIN: No rashes ecchymoses or cyanosis  HEENT: Head is normocephalic and atraumatic. Extraocular muscles are intact. Mucous membranes are moist.   Neck supple LN not felt, JVP not increased  Thyroid not enlarged  Lymphatic: No lymphadenopathy  Cardiovascular:  S1 S2  heard ,RSR , JVP not increased , syst murmur at apex, No  gallop or rub  Respiratory:  Symmetrical chest wall movements Breathing vesicular , Percussion note normal no dulness   with  basilar coarse rales , no  wheeze  ABDOMEN:  Soft, Non-tender,  No  hepatosplenomegaly ,BS positive		  Extremities: Normal range of motion, No clubbing, cyanosis or edema , No calf tenderness  Vascular: Peripheral pulses palpable 2+ bilaterally  CNS: Alert and oriented x3,  Mood and affect appropriate  Cranial nerves intact  sensory intact  motor Power5/5, DTR 2+   Babinski neg    LABS:                        11.6   8.23  )-----------( 194      ( 29 Mar 2021 08:25 )             34.8     03-29    141  |  106  |  15  ----------------------------<  161<H>  3.2<L>   |  28  |  0.70    Ca    8.4      29 Mar 2021 08:25  Phos  2.4     03-29  Mg     2.1     03-29    TPro  5.7<L>  /  Alb  2.0<L>  /  TBili  0.5  /  DBili  x   /  AST  9<L>  /  ALT  20  /  AlkPhos  66  03-29          CARDIAC MARKERS ( 28 Mar 2021 08:10 )  0.044 ng/mL / x     / x     / x     / x      CARDIAC MARKERS ( 27 Mar 2021 17:57 )  0.050 ng/mL / x     / 34 U/L / x     / x        HbA1C  6.7    Serum Pro-Brain Natriuretic Peptide: 224 pg/mL (03-27-21 @ 17:57)      Procalcitonin, Serum: 0.04 ng/mL (03-28-21 @ 12:09)    MICROBIOLOGY:    Culture - Blood (collected 03-27-21 @ 21:06)  Source: .Blood Blood  Preliminary Report (03-28-21 @ 22:02):    No growth to date.    Culture - Blood (collected 03-27-21 @ 21:06)  Source: .Blood Blood  Preliminary Report (03-28-21 @ 22:02):    No growth to date.        RADIOLOGY & ADDITIONAL STUDIES:    CXR:< from: Xray Chest 1 View-PORTABLE IMMEDIATE (03.27.21 @ 18:00) >  Present film shows heart magnified by technique.    Infiltrates have increased and are concentrated in the mid lateral lung fields consistent with Covid pneumonia.    CXR < from: Xray Chest 1 View-PORTABLE IMMEDIATE (03.15.21 @ 16:33) >  Heart magnified by AP film shallow inspiration. Unfolding calcificationthoracic aorta. Bibasilar fibrotic/atelectatic stranding. No focal consolidation. Trace right pleural effusion. No acute infiltrate or pleural effusion.        Ct scan chest:< from: CT Angio Chest w/ IV Cont (03.27.21 @ 21:28) >  *  No pulmonary embolism.  *  Worsening bilateral groundglass and consolidative opacities compatible with COVID-19 pneumonia.      < from: CT Head No Cont (03.27.21 @ 18:06) >  No acute intracranial findings. Likely incidental calvarial lesion suggestive of a venous lake            ekg;< from: 12 Lead ECG (03.27.21 @ 16:45) >  Normal sinus rhythm  Left axis deviation

## 2021-03-29 NOTE — PROGRESS NOTE ADULT - PROBLEM SELECTOR PLAN 1
Patient reporting fall during effort few days ago while was walking w/.o oxygen  Associated with dizziness, cannot exactly recall the episode  patient was recently discharged with home oxygen , non compliant   No chest pain, No LOC  -EKG: NSR with rate of 80  -On last admission, patient was found to be bradycardiac  - monitor in telemetry: bradycardic at times, asymptomatic  - f/u Echo   - Supplemental O2

## 2021-03-29 NOTE — PROGRESS NOTE ADULT - PROBLEM SELECTOR PLAN 2
Patient is Covid positive from March 15 admission,   Finished Remdesevir and Dexamethasone on last admission  Not compliant with Oxygen  Continue supplemental oxygen, titrate as needed  CXR shows worsening or infiltrate in periphery and B/L pleural effusion on CT  No PE   Continue Dexamethasone  Albuterol PRN , symbicort bid  f/u covid markers   Pulm consult : Dr. chapman

## 2021-03-29 NOTE — CONSULT NOTE ADULT - ASSESSMENT
B/L Covid Pneumonia Rt > Lt   NIDDM   Htn   HLD   Prostate surgery Hx      PLAN-- Monitor o2 sat   O2 n/l 2-3 lpm   Po dexamethasone 6 mg qd x 10 days   Doabafezu343/4.5 2 puffs bid   FS Coverage   s/c lovenox   f/u PCR   Thanks for consult   Discussed with residents and PMD

## 2021-03-30 ENCOUNTER — TRANSCRIPTION ENCOUNTER (OUTPATIENT)
Age: 86
End: 2021-03-30

## 2021-03-30 LAB
ALBUMIN SERPL ELPH-MCNC: 2.2 G/DL — LOW (ref 3.5–5)
ALP SERPL-CCNC: 72 U/L — SIGNIFICANT CHANGE UP (ref 40–120)
ALT FLD-CCNC: 25 U/L DA — SIGNIFICANT CHANGE UP (ref 10–60)
ANION GAP SERPL CALC-SCNC: 9 MMOL/L — SIGNIFICANT CHANGE UP (ref 5–17)
AST SERPL-CCNC: 15 U/L — SIGNIFICANT CHANGE UP (ref 10–40)
BILIRUB SERPL-MCNC: 0.5 MG/DL — SIGNIFICANT CHANGE UP (ref 0.2–1.2)
BUN SERPL-MCNC: 14 MG/DL — SIGNIFICANT CHANGE UP (ref 7–18)
CALCIUM SERPL-MCNC: 8.7 MG/DL — SIGNIFICANT CHANGE UP (ref 8.4–10.5)
CHLORIDE SERPL-SCNC: 105 MMOL/L — SIGNIFICANT CHANGE UP (ref 96–108)
CO2 SERPL-SCNC: 29 MMOL/L — SIGNIFICANT CHANGE UP (ref 22–31)
CREAT SERPL-MCNC: 0.84 MG/DL — SIGNIFICANT CHANGE UP (ref 0.5–1.3)
CRP SERPL-MCNC: 18 MG/L — HIGH
D DIMER BLD IA.RAPID-MCNC: 402 NG/ML DDU — HIGH
D DIMER BLD IA.RAPID-MCNC: 503 NG/ML DDU — HIGH
ERYTHROCYTE [SEDIMENTATION RATE] IN BLOOD: 28 MM/HR — HIGH (ref 0–20)
FERRITIN SERPL-MCNC: 515 NG/ML — HIGH (ref 30–400)
FERRITIN SERPL-MCNC: 559 NG/ML — HIGH (ref 30–400)
GLUCOSE BLDC GLUCOMTR-MCNC: 184 MG/DL — HIGH (ref 70–99)
GLUCOSE BLDC GLUCOMTR-MCNC: 186 MG/DL — HIGH (ref 70–99)
GLUCOSE BLDC GLUCOMTR-MCNC: 195 MG/DL — HIGH (ref 70–99)
GLUCOSE BLDC GLUCOMTR-MCNC: 361 MG/DL — HIGH (ref 70–99)
GLUCOSE SERPL-MCNC: 154 MG/DL — HIGH (ref 70–99)
HCT VFR BLD CALC: 34.6 % — LOW (ref 39–50)
HGB BLD-MCNC: 11.4 G/DL — LOW (ref 13–17)
LDH SERPL L TO P-CCNC: 186 U/L — SIGNIFICANT CHANGE UP (ref 120–225)
MAGNESIUM SERPL-MCNC: 2 MG/DL — SIGNIFICANT CHANGE UP (ref 1.6–2.6)
MCHC RBC-ENTMCNC: 30.4 PG — SIGNIFICANT CHANGE UP (ref 27–34)
MCHC RBC-ENTMCNC: 32.9 GM/DL — SIGNIFICANT CHANGE UP (ref 32–36)
MCV RBC AUTO: 92.3 FL — SIGNIFICANT CHANGE UP (ref 80–100)
NRBC # BLD: 0 /100 WBCS — SIGNIFICANT CHANGE UP (ref 0–0)
PHOSPHATE SERPL-MCNC: 2.4 MG/DL — LOW (ref 2.5–4.5)
PLATELET # BLD AUTO: 211 K/UL — SIGNIFICANT CHANGE UP (ref 150–400)
POTASSIUM SERPL-MCNC: 3.3 MMOL/L — LOW (ref 3.5–5.3)
POTASSIUM SERPL-SCNC: 3.3 MMOL/L — LOW (ref 3.5–5.3)
PROT SERPL-MCNC: 5.9 G/DL — LOW (ref 6–8.3)
RBC # BLD: 3.75 M/UL — LOW (ref 4.2–5.8)
RBC # FLD: 11.9 % — SIGNIFICANT CHANGE UP (ref 10.3–14.5)
SODIUM SERPL-SCNC: 143 MMOL/L — SIGNIFICANT CHANGE UP (ref 135–145)
WBC # BLD: 6.2 K/UL — SIGNIFICANT CHANGE UP (ref 3.8–10.5)
WBC # FLD AUTO: 6.2 K/UL — SIGNIFICANT CHANGE UP (ref 3.8–10.5)

## 2021-03-30 PROCEDURE — 99232 SBSQ HOSP IP/OBS MODERATE 35: CPT | Mod: GC

## 2021-03-30 RX ORDER — DEXAMETHASONE 0.5 MG/5ML
1 ELIXIR ORAL
Qty: 5 | Refills: 0
Start: 2021-03-30 | End: 2021-04-03

## 2021-03-30 RX ORDER — DEXTROSE 50 % IN WATER 50 %
12.5 SYRINGE (ML) INTRAVENOUS ONCE
Refills: 0 | Status: DISCONTINUED | OUTPATIENT
Start: 2021-03-30 | End: 2021-03-31

## 2021-03-30 RX ORDER — SODIUM CHLORIDE 9 MG/ML
1000 INJECTION, SOLUTION INTRAVENOUS
Refills: 0 | Status: DISCONTINUED | OUTPATIENT
Start: 2021-03-30 | End: 2021-03-30

## 2021-03-30 RX ORDER — POTASSIUM CHLORIDE 20 MEQ
40 PACKET (EA) ORAL DAILY
Refills: 0 | Status: DISCONTINUED | OUTPATIENT
Start: 2021-03-30 | End: 2021-03-31

## 2021-03-30 RX ORDER — INSULIN LISPRO 100/ML
VIAL (ML) SUBCUTANEOUS
Refills: 0 | Status: DISCONTINUED | OUTPATIENT
Start: 2021-03-30 | End: 2021-03-31

## 2021-03-30 RX ORDER — POTASSIUM CHLORIDE 20 MEQ
40 PACKET (EA) ORAL ONCE
Refills: 0 | Status: COMPLETED | OUTPATIENT
Start: 2021-03-30 | End: 2021-03-30

## 2021-03-30 RX ORDER — GLUCAGON INJECTION, SOLUTION 0.5 MG/.1ML
1 INJECTION, SOLUTION SUBCUTANEOUS ONCE
Refills: 0 | Status: DISCONTINUED | OUTPATIENT
Start: 2021-03-30 | End: 2021-03-31

## 2021-03-30 RX ORDER — BUDESONIDE AND FORMOTEROL FUMARATE DIHYDRATE 160; 4.5 UG/1; UG/1
2 AEROSOL RESPIRATORY (INHALATION)
Qty: 1 | Refills: 0
Start: 2021-03-30 | End: 2021-04-28

## 2021-03-30 RX ORDER — DEXTROSE 50 % IN WATER 50 %
25 SYRINGE (ML) INTRAVENOUS ONCE
Refills: 0 | Status: DISCONTINUED | OUTPATIENT
Start: 2021-03-30 | End: 2021-03-31

## 2021-03-30 RX ORDER — POLYETHYLENE GLYCOL 3350 17 G/17G
17 POWDER, FOR SOLUTION ORAL DAILY
Refills: 0 | Status: DISCONTINUED | OUTPATIENT
Start: 2021-03-30 | End: 2021-03-31

## 2021-03-30 RX ORDER — DEXTROSE 50 % IN WATER 50 %
15 SYRINGE (ML) INTRAVENOUS ONCE
Refills: 0 | Status: DISCONTINUED | OUTPATIENT
Start: 2021-03-30 | End: 2021-03-31

## 2021-03-30 RX ORDER — SENNA PLUS 8.6 MG/1
2 TABLET ORAL AT BEDTIME
Refills: 0 | Status: DISCONTINUED | OUTPATIENT
Start: 2021-03-30 | End: 2021-03-31

## 2021-03-30 RX ADMIN — SENNA PLUS 2 TABLET(S): 8.6 TABLET ORAL at 21:14

## 2021-03-30 RX ADMIN — POLYETHYLENE GLYCOL 3350 17 GRAM(S): 17 POWDER, FOR SOLUTION ORAL at 12:22

## 2021-03-30 RX ADMIN — Medication 81 MILLIGRAM(S): at 12:18

## 2021-03-30 RX ADMIN — BUDESONIDE AND FORMOTEROL FUMARATE DIHYDRATE 2 PUFF(S): 160; 4.5 AEROSOL RESPIRATORY (INHALATION) at 21:14

## 2021-03-30 RX ADMIN — Medication 5: at 12:17

## 2021-03-30 RX ADMIN — MIRTAZAPINE 15 MILLIGRAM(S): 45 TABLET, ORALLY DISINTEGRATING ORAL at 21:14

## 2021-03-30 RX ADMIN — Medication 10 MILLILITER(S): at 06:01

## 2021-03-30 RX ADMIN — Medication 1 DROP(S): at 17:19

## 2021-03-30 RX ADMIN — Medication 1 DROP(S): at 09:32

## 2021-03-30 RX ADMIN — Medication 1 DROP(S): at 21:13

## 2021-03-30 RX ADMIN — Medication 40 MILLIEQUIVALENT(S): at 12:20

## 2021-03-30 RX ADMIN — PANTOPRAZOLE SODIUM 40 MILLIGRAM(S): 20 TABLET, DELAYED RELEASE ORAL at 06:01

## 2021-03-30 RX ADMIN — LOSARTAN POTASSIUM 100 MILLIGRAM(S): 100 TABLET, FILM COATED ORAL at 06:01

## 2021-03-30 RX ADMIN — Medication 1 DROP(S): at 06:01

## 2021-03-30 RX ADMIN — Medication 40 MILLIEQUIVALENT(S): at 17:18

## 2021-03-30 RX ADMIN — SIMVASTATIN 20 MILLIGRAM(S): 20 TABLET, FILM COATED ORAL at 21:14

## 2021-03-30 RX ADMIN — Medication 1 DROP(S): at 13:04

## 2021-03-30 RX ADMIN — Medication 325 MILLIGRAM(S): at 12:18

## 2021-03-30 RX ADMIN — ENOXAPARIN SODIUM 40 MILLIGRAM(S): 100 INJECTION SUBCUTANEOUS at 12:18

## 2021-03-30 RX ADMIN — Medication 25 MILLIGRAM(S): at 06:01

## 2021-03-30 RX ADMIN — Medication 10 MILLILITER(S): at 13:03

## 2021-03-30 RX ADMIN — BUDESONIDE AND FORMOTEROL FUMARATE DIHYDRATE 2 PUFF(S): 160; 4.5 AEROSOL RESPIRATORY (INHALATION) at 09:31

## 2021-03-30 RX ADMIN — Medication 1: at 08:03

## 2021-03-30 RX ADMIN — Medication 6 MILLIGRAM(S): at 06:01

## 2021-03-30 RX ADMIN — Medication 10 MILLILITER(S): at 21:14

## 2021-03-30 RX ADMIN — Medication 2: at 17:16

## 2021-03-30 NOTE — PROGRESS NOTE ADULT - SUBJECTIVE AND OBJECTIVE BOX
PULMONARY  progress note    BRYAN LARA  MRN-[id number]    Patient is a 86y old  Male who presents with a chief complaint of Covid + Syncope (28 Mar 2021 08:45)  Feels better, sitting in bed, Talked to wife  No sob, Cough+      MEDICATIONS  (STANDING):  aspirin  chewable 81 milliGRAM(s) Oral daily  budesonide 160 MICROgram(s)/formoterol 4.5 MICROgram(s) Inhaler 2 Puff(s) Inhalation two times a day  carboxymethylcellulose 0.5% (Preservative-Free) Ophthalmic Solution 1 Drop(s) Both EYES every 4 hours  dexAMETHasone  Injectable 6 milliGRAM(s) IV Push daily  enoxaparin Injectable 40 milliGRAM(s) SubCutaneous daily  ferrous    sulfate 325 milliGRAM(s) Oral daily  guaifenesin/dextromethorphan  Syrup 10 milliLiter(s) Oral three times a day  hydrochlorothiazide 25 milliGRAM(s) Oral daily  insulin lispro (ADMELOG) corrective regimen sliding scale   SubCutaneous three times a day before meals  insulin lispro (ADMELOG) corrective regimen sliding scale   SubCutaneous at bedtime  losartan 100 milliGRAM(s) Oral daily  mirtazapine 15 milliGRAM(s) Oral at bedtime  pantoprazole    Tablet 40 milliGRAM(s) Oral before breakfast  simvastatin 20 milliGRAM(s) Oral at bedtime  sodium chloride 0.9%. 1000 milliLiter(s) (75 mL/Hr) IV Continuous <Continuous>      Allergies    No Known Allergies            PAST MEDICAL & SURGICAL HISTORY:  Diabetes mellitus    HTN (hypertension)    Hypercholesterolemia    S/P hernia repair    History of prostate surgery             REVIEW OF SYSTEMS:  CONSTITUTIONAL: No fever, weight loss, or fatigue   EYES: No eye pain, visual disturbances, or discharge  ENT:  No difficulty hearing, tinnitus, vertigo; No sinus or throat pain  NECK: No pain or stiffness or nodes  RESPIRATORY:  cough +  wheezing --  chills-- hemoptysis--  Shortness of Breath+  CARDIOVASCULAR: No chest pain, palpitations, passing out, dizziness, or leg swelling  GASTROINTESTINAL: No abdominal or epigastric pain. No nausea, vomiting,   GENITOURINARY: No dysuria, frequency, hematuria, or incontinence  NEUROLOGICAL: No headaches, memory loss, loss of strength, numbness, or tremors  SKIN: No itching, burning, rashes, or lesions   LYMPH Nodes: No enlarged glands  ENDOCRINE: No heat or cold intolerance; No hair loss  MUSCULOSKELETAL: No joint pain or swelling; No muscle, back, or extremity pain  HEME/LYMPH: No easy bruising, or bleeding gums  ALLERGY AND IMMUNOLOGIC: No hives or eczema    Vital Signs Last 24 Hrs  T(C): 37.1 (30 Mar 2021 07:22), Max: 37.1 (30 Mar 2021 07:22)  T(F): 98.7 (30 Mar 2021 07:22), Max: 98.7 (30 Mar 2021 07:22)  HR: 70 (30 Mar 2021 07:22) (58 - 73)  BP: 149/74 (30 Mar 2021 07:22) (113/50 - 152/69)  BP(mean): --  RR: 18 (30 Mar 2021 07:22) (18 - 18)  SpO2: 95% (30 Mar 2021 07:22) (95% - 97%)  I&O's Detail      PHYSICAL EXAMINATION:    GENERAL: The patient is a well-developed w/m in no apparent distress.   SKIN no rash ecchymoses or bruises  HEENT: Head is normocephalic and atraumatic  GALILEA , Extraocular muscles are intact.   Neck supple ,No LN felt JVP not increased  Thyroid not enlarged  Cardiovascular:  S1 S2 heard, RSR, No JVD , systolic  murmur at apex, No gallop or rub  Respiratory: Chest wall symmetrical with good air entry ,Percussion note normal,    Lungs vesicular breathing with basilar lt > rt   rales , no   wheeze	  ABDOMEN:  Soft, Non-tender,  no hepatomegaly or splenomegaly BS positive	  Extremities: Normal range of motion, No clubbing, cyanosis or edema  Vascular: Peripheral pulses palpable 2+ bilaterally  CNS:  Alert and oriented x 3   Cranial nerves intact  sensory intact  motor power 5/5  dtr 2+   Babinski neg    LABS:                        11.4   6.20  )-----------( 211      ( 30 Mar 2021 09:17 )             34.6     03-29    141  |  106  |  15  ----------------------------<  161<H>  3.2<L>   |  28  |  0.70    Ca    8.4      29 Mar 2021 08:25  Phos  2.4     03-29  Mg     2.1     03-29    TPro  5.7<L>  /  Alb  2.0<L>  /  TBili  0.5  /  DBili  x   /  AST  9<L>  /  ALT  20  /  AlkPhos  66  03-29      Serum Pro-Brain Natriuretic Peptide: 224 pg/mL (03-27-21 @ 17:57)  Procalcitonin, Serum: 0.04 ng/mL (03-28-21 @ 12:09)    MICROBIOLOGY:    Culture - Blood (collected 03-27-21 @ 21:06)  Source: .Blood   Preliminary Report (03-28-21 @ 22:02):    No growth to date.    Culture - Blood (collected 03-27-21 @ 21:06)  Source: .Blood   Preliminary Report (03-28-21 @ 22:02):    No growth to date.

## 2021-03-30 NOTE — DISCHARGE NOTE PROVIDER - NSFOLLOWUPCLINICS_GEN_ALL_ED_FT
[facility]  Internal Medicine  [address] [place] [address]  Phone: [phone]  Fax: [phone]

## 2021-03-30 NOTE — PROGRESS NOTE ADULT - PROBLEM SELECTOR PLAN 1
Patient reporting fall during effort few days ago while was walking w/.o oxygen  Associated with dizziness, cannot exactly recall the episode  patient was recently discharged with home oxygen , non compliant   No chest pain, No LOC  -EKG: NSR with rate of 80  -On last admission, patient was found to be bradycardiac  - monitor in telemetry: bradycardic at times, asymptomatic  - Supplemental O2

## 2021-03-30 NOTE — DISCHARGE NOTE PROVIDER - NSDCMRMEDTOKEN_GEN_ALL_CORE_FT
Albuterol (Eqv-Proventil HFA) 90 mcg/inh inhalation aerosol: 2 puff(s) inhaled every 6 hours AS NEEDED for shortness of breath or wheezing  aspirin 81 mg oral tablet: 1 tab(s) orally once a day  benzonatate 100 mg oral capsule: 1 cap(s) orally 3 times a day, As needed, Cough  budesonide-formoterol 160 mcg-4.5 mcg/inh inhalation aerosol: 2 puff(s) inhaled 2 times a day   carboxymethylcellulose-Na hyaluronate: 1 drop in each eye 4 times a day  dexamethasone 6 mg oral tablet: 1 tab(s) orally once a day   FeroSul 325 mg (65 mg elemental iron) oral tablet: 1 tab(s) orally once a day  GuaiFENesin  mg-10 mg/5 mL oral liquid: 10 milliliter(s) orally 3 times a day  as needed for cough  hydroCHLOROthiazide 25 mg oral tablet: 1 tab(s) orally once a day  Januvia 100 mg oral tablet: 1 tab(s) orally once a day  metFORMIN 500 mg oral tablet: 1 tab(s) orally once a day  mirtazapine 15 mg oral tablet: 1 tab(s) orally once a day (at bedtime)  olmesartan 40 mg oral tablet: 1 tab(s) orally once a day  pantoprazole 40 mg oral delayed release tablet: 1 tab(s) orally once a day  pulse oximeter: Check oxygen 3 times a day or when short of breath  rivaroxaban 10 mg oral tablet: 1 tab(s) orally once a day   Zocor 20 mg oral tablet: 1 tab(s) orally once a day (at bedtime)

## 2021-03-30 NOTE — DISCHARGE NOTE PROVIDER - NSDCCPCAREPLAN_GEN_ALL_CORE_FT
PRINCIPAL DISCHARGE DIAGNOSIS  Diagnosis: COVID-19  Assessment and Plan of Treatment: You presented after feeling weak and dizzy at home when not using your Home oxygen. You had an xray and were told that you "had fluids in your lungs". Repeat xray shows worsening of your bilateral infiltrates because of Covid damage. You do not have fluid in your lungs. You need to continue to use home oxygen and will be dischaged on oral and inhaled steroids   CORONAVIRUS INSTRUCTIONS:   Based on your current clinical status and stability, it has been determined that you no longer need hospitalization and can recover while remaining in self-quarantine at home. You should follow the prevention steps below until a healthcare provider or local or state health department says you can return to your normal activities.   1. You should restrict activities outside your home, except for getting medical care.   2. Do not go to work, school, or public areas.   3. Avoid using public transportation, ride-sharing, or taxis.   4. Separate yourself from other people and animals in your home as much as possible.  When you are around other people (e.g., sharing a room or vehicle) you should wear a facemask.  5. Wash your hands often with soap and water for at least 20 seconds, especially after blowing your nose, coughing, or sneezing; going to the bathroom; and before eating or preparing food.  6. Cover your mouth and nose with a tissue when you cough or sneeze. Throw used tissues in a lined trash can. Immediately wash your hands with soap and water for at least 20 seconds  7. High touch surfaces include counters, tabletops, doorknobs, bathroom fixtures, toilets, phones, keyboards, tablets, and bedside tables.  8. Avoid sharing dishes, drinking glasses, cups, eating utensils, towels, or bedding with other people or pets in your home. After using these items, they should be washed thoroughly with soap and water.  You are strongly advised to seek prompt medical attention if your illness worsens or you develop new symptoms like fever or difficulty breathing.      SECONDARY DISCHARGE DIAGNOSES  Diagnosis: HTN (hypertension)  Assessment and Plan of Treatment: You have a history of hypertension and take Losartan, and Hydrochlorothiazide for it. Your blood pressure was controlled during your admission. You should continue to take your medications as prescribed. You should follow a DASH (Dietary Approaches to Stop Hypertension) diet. The DASH diet encourages you to reduce the sodium in your diet and eat a variety of foods rich in nutrients that help lower blood pressure, such as potassium, calcium and magnesium. You should try to excersise at least 150 minutes per week. You should follow up with your PCP within 1 week of discharge for monitoring and medication adjustment.    Diagnosis: Diabetes mellitus  Assessment and Plan of Treatment: You have a history of Diabetes, your HbA1c is 6.7. You should continue to take your medication regimen regularly as prescribed. You need to continue monitoring your blood sugar levels closely. Please maintain healthy lifestyle by eating healthy diabetic regimen, weight loss and exercise regularly as tolerated. Make sure you get your HgA1c checked every three months. If you take oral diabetes med`ications, check your blood glucose two times a day. If you take insulin, check your blood glucose before meals and at bedtime.  It's important not to skip any meals. Keep a log of your blood glucose results and always take it with you to your doctor appointments. Keep a list of your current medications including injectables and over the counter medications and bring this medication list with you to all your doctor appointments. If you have not seen your ophthalmologist this year call for appointment. Check your feet daily for redness, sores, or openings. Do not self treat. If no improvement in two days call your primary care physician for an appointment. Low blood sugar (hypoglycemia) is a blood sugar below 70mg/dl. Check your blood sugar if you feel signs/symptoms of hypoglycemia. If your blood sugar is below 70 take 15 grams of carbohydrates (ex 4 oz of apple juice, 3-4 glucose tablets, or 4-6 oz of regular soda) wait 15 minutes and repeat blood sugar to make sure it comes up above 70.  If your blood sugar is above 70 and you are due for a meal, have a meal. If you are not due for a meal have a snack.  This snack helps keeps your blood sugar at a safe range. Please follow up with your primary care provider/endocrinologist within a week of discharge.

## 2021-03-30 NOTE — PROGRESS NOTE ADULT - PROBLEM SELECTOR PLAN 2
Patient is Covid positive from March 15 admission,   Finished Remdesevir and Dexamethasone on last admission  Not compliant with Oxygen  Continue supplemental oxygen, titrate as needed  CXR shows worsening or infiltrate in periphery and B/L pleural effusion on CT  No PE   Continue Dexamethasone - DC on PO Dexa  Albuterol PRN , symbicort bid  f/u covid markers   Pulm consult : Dr. chapman

## 2021-03-30 NOTE — PROGRESS NOTE ADULT - ATTENDING COMMENTS
Patient seen/evaluated at bedside on 3/29/21. I agree with the resident progress note/outlined plan of care. My independent findings and conclusions are documented.    Patient requested that I speak with HCP and significant other (girlfriend) Ms. Mariee as Citizen of [place] . Patient reports intermittent cough, chest tightness. He is able to speak in complete sentences with 02 saturations of 95-97% on 2.0L. Patient advised to comply with oxygen at all times when at home as apparently home 02 compliance was a concern.     Clinically he appears stable on the home oxygen he was previously on at home. Explained to Ms. Mariee that it may take some time for Patient to return completely to his baseline function. She is reluctant for him to be discharged w/in 24 hours as she feels this may be too early and he lives alone  -c/w decadron  -appreciate pulmonary input  -would re-arrange for discharge tomorrow or Wednesday--> discussed at IDRs  -provide 24 hour discharge notice  -per HCP, patient is to follow at the [facility] but cannot recall his follow up appt--> will need to call to confirm
[facility] planning for tomorrow
feels good, per patient never fell down, never syncopized per patient. per him was sent to hospital as was told by home service agency that has fluid in lungs. Patients repeat CT scan shows new bl opacities - will start steroids- pt received Ramdesivir in previous admission. Pulmonary consult.  no s.s of heart failure, trop indeterminate- no cardiac s/s  saturating well on 2 litre NC

## 2021-03-30 NOTE — PROGRESS NOTE ADULT - SUBJECTIVE AND OBJECTIVE BOX
PGY-1 Progress Note discussed with attending    PAGER #: [[phone]] TILL 5:00 PM  PLEASE CONTACT ON CALL TEAM:  - On Call Team (Please refer to Toni) FROM 5:00 PM - 8:30PM  - Nightfloat Team FROM 8:30 -7:30 AM    CHIEF COMPLAINT & BRIEF HOSPITAL COURSE:  86 y.o. male with NIDDM, Covid positive recently discharged is coming to hospital as he was told that he fluids in his lungs. Patient was recently discharged home with home oxygen, however he reports that he cannot use oxygen all the time as the equipment is heavy.   Patient also reports that he had a fall few days ago while he was walking to the kitchen (w/o Oxygen). Patient remembers feeling dizzy at that time.  Pt also complains of generalized weakness, chills, dizziness when he gets up, and has chest pain with coughing. Patient is pending Discharge home with home oxygen.      INTERVAL HPI/OVERNIGHT EVENTS:   Patient seen and examined at bedside. No events overnight. complains of constipation. will start bowel regimen.     REVIEW OF SYSTEMS:  CONSTITUTIONAL: No fever, weight loss, or fatigue  RESPIRATORY: No cough, wheezing, chills or hemoptysis; No shortness of breath  CARDIOVASCULAR: No chest pain, palpitations, dizziness, or leg swelling  GASTROINTESTINAL: No abdominal pain. No nausea, vomiting, or hematemesis; + constipation. No melena or hematochezia.  GENITOURINARY: No dysuria or hematuria, urinary frequency  MUSCULOSKELETAL: No pain, no Limited ROM  NEUROLOGICAL: No headaches, memory loss, loss of strength, numbness, or tremors  SKIN: No itching, burning, rashes, or lesions     MEDICATIONS  (STANDING):  aspirin  chewable 81 milliGRAM(s) Oral daily  budesonide 160 MICROgram(s)/formoterol 4.5 MICROgram(s) Inhaler 2 Puff(s) Inhalation two times a day  carboxymethylcellulose 0.5% (Preservative-Free) Ophthalmic Solution 1 Drop(s) Both EYES every 4 hours  dexAMETHasone  Injectable 6 milliGRAM(s) IV Push daily  enoxaparin Injectable 40 milliGRAM(s) SubCutaneous daily  ferrous    sulfate 325 milliGRAM(s) Oral daily  guaifenesin/dextromethorphan  Syrup 10 milliLiter(s) Oral three times a day  hydrochlorothiazide 25 milliGRAM(s) Oral daily  insulin lispro (ADMELOG) corrective regimen sliding scale   SubCutaneous three times a day before meals  insulin lispro (ADMELOG) corrective regimen sliding scale   SubCutaneous at bedtime  losartan 100 milliGRAM(s) Oral daily  mirtazapine 15 milliGRAM(s) Oral at bedtime  pantoprazole    Tablet 40 milliGRAM(s) Oral before breakfast  senna 2 Tablet(s) Oral at bedtime  simvastatin 20 milliGRAM(s) Oral at bedtime  sodium chloride 0.9%. 1000 milliLiter(s) (75 mL/Hr) IV Continuous <Continuous>    MEDICATIONS  (PRN):  ALBUTerol    90 MICROgram(s) HFA Inhaler 2 Puff(s) Inhalation every 6 hours PRN Shortness of Breath and/or Wheezing  benzonatate 100 milliGRAM(s) Oral three times a day PRN Cough  polyethylene glycol 3350 17 Gram(s) Oral daily PRN Constipation      Vital Signs Last 24 Hrs  T(C): 36.7 (30 Mar 2021 11:15), Max: 37.1 (30 Mar 2021 07:22)  T(F): 98 (30 Mar 2021 11:15), Max: 98.7 (30 Mar 2021 07:22)  HR: 87 (30 Mar 2021 11:15) (58 - 87)  BP: 134/65 (30 Mar 2021 11:15) (113/50 - 149/74)  BP(mean): --  RR: 18 (30 Mar 2021 11:15) (18 - 18)  SpO2: 99% (30 Mar 2021 11:15) (95% - 99%)    PHYSICAL EXAMINATION:  GENERAL: NAD, well built,  2L NC  HEAD:  Atraumatic, Normocephalic  EYES:  conjunctiva and sclera clear, no scleral icterus  NECK: Supple, No JVD, Normal thyroid  CHEST/LUNG: Clear to auscultation.  No rales, rhonchi, wheezing, or rubs  HEART: Regular rate and rhythm; No murmurs, rubs, or gallops  ABDOMEN: Soft, Nontender, Nondistended; Bowel sounds present  NERVOUS SYSTEM:  Alert & Oriented X3,  Strength 5/5 in upper and lower extremities, sensation intact  EXTREMITIES:  2+ Peripheral Pulses, No clubbing, cyanosis, or edema  SKIN: warm dry, no lesions noted                          11.4   6.20  )-----------( 211      ( 30 Mar 2021 09:17 )             34.6     03-30    143  |  105  |  14  ----------------------------<  154<H>  3.3<L>   |  29  |  0.84    Ca    8.7      30 Mar 2021 09:17  Phos  2.4     03-30  Mg     2.0     03-30    TPro  5.9<L>  /  Alb  2.2<L>  /  TBili  0.5  /  DBili  x   /  AST  15  /  ALT  25  /  AlkPhos  72  03-30    LIVER FUNCTIONS - ( 30 Mar 2021 09:17 )  Alb: 2.2 g/dL / Pro: 5.9 g/dL / ALK PHOS: 72 U/L / ALT: 25 U/L DA / AST: 15 U/L / GGT: x                 I&O's Summary      Culture - Blood (collected 27 Mar 2021 21:06)  Source: .Blood Blood  Preliminary Report (28 Mar 2021 22:02):    No growth to date.    Culture - Blood (collected 27 Mar 2021 21:06)  Source: .Blood Blood  Preliminary Report (28 Mar 2021 22:02):    No growth to date.        RADIOLOGY & ADDITIONAL TESTS:

## 2021-03-30 NOTE — DISCHARGE NOTE PROVIDER - HOSPITAL COURSE
86 y.o. male with NIDDM, Covid positive recently discharged is coming to hospital as he was told that he fluids in his lungs. Patient was recently discharged home with home oxygen, however he reports that he cannot use oxygen all the time as the equipment is heavy.   Patient also reports that he had a "fall" few days ago while he was walking to the kitchen (w/o Oxygen). Patient remembers feeling dizzy at that time. Upon questioning he endorsed that he didn't fall or lose conciousness, he felt dizzy and sat on the floor. Pt also complains of generalized weakness, chills, dizziness when he gets up, and has chest pain with coughing. Patient is pending Discharge home with home oxygen.    Patient seen and examined at bedside. No events overnight.    Pt is stable for discharge. Pt has been advised to follow up as outpatient. Case has jael discussed with the attending. This is just a summary of the case. For further information please refer to pt. chart document.

## 2021-03-31 ENCOUNTER — TRANSCRIPTION ENCOUNTER (OUTPATIENT)
Age: 86
End: 2021-03-31

## 2021-03-31 VITALS
SYSTOLIC BLOOD PRESSURE: 141 MMHG | HEART RATE: 85 BPM | RESPIRATION RATE: 18 BRPM | TEMPERATURE: 98 F | OXYGEN SATURATION: 97 % | DIASTOLIC BLOOD PRESSURE: 65 MMHG

## 2021-03-31 LAB
ALBUMIN SERPL ELPH-MCNC: 2.5 G/DL — LOW (ref 3.5–5)
ALP SERPL-CCNC: 83 U/L — SIGNIFICANT CHANGE UP (ref 40–120)
ALT FLD-CCNC: 39 U/L DA — SIGNIFICANT CHANGE UP (ref 10–60)
ANION GAP SERPL CALC-SCNC: 9 MMOL/L — SIGNIFICANT CHANGE UP (ref 5–17)
AST SERPL-CCNC: 23 U/L — SIGNIFICANT CHANGE UP (ref 10–40)
BILIRUB SERPL-MCNC: 0.5 MG/DL — SIGNIFICANT CHANGE UP (ref 0.2–1.2)
BUN SERPL-MCNC: 15 MG/DL — SIGNIFICANT CHANGE UP (ref 7–18)
CALCIUM SERPL-MCNC: 8.6 MG/DL — SIGNIFICANT CHANGE UP (ref 8.4–10.5)
CHLORIDE SERPL-SCNC: 103 MMOL/L — SIGNIFICANT CHANGE UP (ref 96–108)
CO2 SERPL-SCNC: 30 MMOL/L — SIGNIFICANT CHANGE UP (ref 22–31)
CREAT SERPL-MCNC: 0.88 MG/DL — SIGNIFICANT CHANGE UP (ref 0.5–1.3)
CRP SERPL-MCNC: 21 MG/L — HIGH
FERRITIN SERPL-MCNC: 496 NG/ML — HIGH (ref 30–400)
GLUCOSE BLDC GLUCOMTR-MCNC: 118 MG/DL — HIGH (ref 70–99)
GLUCOSE BLDC GLUCOMTR-MCNC: 164 MG/DL — HIGH (ref 70–99)
GLUCOSE BLDC GLUCOMTR-MCNC: 421 MG/DL — HIGH (ref 70–99)
GLUCOSE SERPL-MCNC: 159 MG/DL — HIGH (ref 70–99)
HCT VFR BLD CALC: 35.2 % — LOW (ref 39–50)
HGB BLD-MCNC: 11.7 G/DL — LOW (ref 13–17)
MAGNESIUM SERPL-MCNC: 2.2 MG/DL — SIGNIFICANT CHANGE UP (ref 1.6–2.6)
MCHC RBC-ENTMCNC: 30.7 PG — SIGNIFICANT CHANGE UP (ref 27–34)
MCHC RBC-ENTMCNC: 33.2 GM/DL — SIGNIFICANT CHANGE UP (ref 32–36)
MCV RBC AUTO: 92.4 FL — SIGNIFICANT CHANGE UP (ref 80–100)
NRBC # BLD: 0 /100 WBCS — SIGNIFICANT CHANGE UP (ref 0–0)
PHOSPHATE SERPL-MCNC: 1.8 MG/DL — LOW (ref 2.5–4.5)
PLATELET # BLD AUTO: 216 K/UL — SIGNIFICANT CHANGE UP (ref 150–400)
POTASSIUM SERPL-MCNC: 3.8 MMOL/L — SIGNIFICANT CHANGE UP (ref 3.5–5.3)
POTASSIUM SERPL-SCNC: 3.8 MMOL/L — SIGNIFICANT CHANGE UP (ref 3.5–5.3)
PROT SERPL-MCNC: 6.2 G/DL — SIGNIFICANT CHANGE UP (ref 6–8.3)
RBC # BLD: 3.81 M/UL — LOW (ref 4.2–5.8)
RBC # FLD: 11.9 % — SIGNIFICANT CHANGE UP (ref 10.3–14.5)
SODIUM SERPL-SCNC: 142 MMOL/L — SIGNIFICANT CHANGE UP (ref 135–145)
WBC # BLD: 7.06 K/UL — SIGNIFICANT CHANGE UP (ref 3.8–10.5)
WBC # FLD AUTO: 7.06 K/UL — SIGNIFICANT CHANGE UP (ref 3.8–10.5)

## 2021-03-31 PROCEDURE — 99239 HOSP IP/OBS DSCHRG MGMT >30: CPT

## 2021-03-31 PROCEDURE — 82962 GLUCOSE BLOOD TEST: CPT

## 2021-03-31 PROCEDURE — 82009 KETONE BODYS QUAL: CPT

## 2021-03-31 PROCEDURE — 87040 BLOOD CULTURE FOR BACTERIA: CPT

## 2021-03-31 PROCEDURE — 85027 COMPLETE CBC AUTOMATED: CPT

## 2021-03-31 PROCEDURE — 71275 CT ANGIOGRAPHY CHEST: CPT

## 2021-03-31 PROCEDURE — 85379 FIBRIN DEGRADATION QUANT: CPT

## 2021-03-31 PROCEDURE — 84484 ASSAY OF TROPONIN QUANT: CPT

## 2021-03-31 PROCEDURE — 71045 X-RAY EXAM CHEST 1 VIEW: CPT

## 2021-03-31 PROCEDURE — 87635 SARS-COV-2 COVID-19 AMP PRB: CPT

## 2021-03-31 PROCEDURE — 70450 CT HEAD/BRAIN W/O DYE: CPT

## 2021-03-31 PROCEDURE — 84145 PROCALCITONIN (PCT): CPT

## 2021-03-31 PROCEDURE — 93005 ELECTROCARDIOGRAM TRACING: CPT

## 2021-03-31 PROCEDURE — 85652 RBC SED RATE AUTOMATED: CPT

## 2021-03-31 PROCEDURE — 94640 AIRWAY INHALATION TREATMENT: CPT

## 2021-03-31 PROCEDURE — 83880 ASSAY OF NATRIURETIC PEPTIDE: CPT

## 2021-03-31 PROCEDURE — 83605 ASSAY OF LACTIC ACID: CPT

## 2021-03-31 PROCEDURE — 85025 COMPLETE CBC W/AUTO DIFF WBC: CPT

## 2021-03-31 PROCEDURE — 82550 ASSAY OF CK (CPK): CPT

## 2021-03-31 PROCEDURE — 80053 COMPREHEN METABOLIC PANEL: CPT

## 2021-03-31 PROCEDURE — 83615 LACTATE (LD) (LDH) ENZYME: CPT

## 2021-03-31 PROCEDURE — 36415 COLL VENOUS BLD VENIPUNCTURE: CPT

## 2021-03-31 PROCEDURE — 83735 ASSAY OF MAGNESIUM: CPT

## 2021-03-31 PROCEDURE — 83520 IMMUNOASSAY QUANT NOS NONAB: CPT

## 2021-03-31 PROCEDURE — 86769 SARS-COV-2 COVID-19 ANTIBODY: CPT

## 2021-03-31 PROCEDURE — 86140 C-REACTIVE PROTEIN: CPT

## 2021-03-31 PROCEDURE — 84100 ASSAY OF PHOSPHORUS: CPT

## 2021-03-31 PROCEDURE — 82728 ASSAY OF FERRITIN: CPT

## 2021-03-31 PROCEDURE — 99285 EMERGENCY DEPT VISIT HI MDM: CPT | Mod: 25

## 2021-03-31 RX ADMIN — PANTOPRAZOLE SODIUM 40 MILLIGRAM(S): 20 TABLET, DELAYED RELEASE ORAL at 06:11

## 2021-03-31 RX ADMIN — BUDESONIDE AND FORMOTEROL FUMARATE DIHYDRATE 2 PUFF(S): 160; 4.5 AEROSOL RESPIRATORY (INHALATION) at 12:19

## 2021-03-31 RX ADMIN — Medication 6 MILLIGRAM(S): at 06:10

## 2021-03-31 RX ADMIN — Medication 325 MILLIGRAM(S): at 12:19

## 2021-03-31 RX ADMIN — ENOXAPARIN SODIUM 40 MILLIGRAM(S): 100 INJECTION SUBCUTANEOUS at 12:18

## 2021-03-31 RX ADMIN — Medication 2: at 09:23

## 2021-03-31 RX ADMIN — Medication 25 MILLIGRAM(S): at 06:09

## 2021-03-31 RX ADMIN — Medication 1 DROP(S): at 09:42

## 2021-03-31 RX ADMIN — Medication 1 DROP(S): at 06:12

## 2021-03-31 RX ADMIN — Medication 10 MILLILITER(S): at 06:09

## 2021-03-31 RX ADMIN — Medication 40 MILLIEQUIVALENT(S): at 12:18

## 2021-03-31 RX ADMIN — LOSARTAN POTASSIUM 100 MILLIGRAM(S): 100 TABLET, FILM COATED ORAL at 06:10

## 2021-03-31 RX ADMIN — Medication 81 MILLIGRAM(S): at 12:18

## 2021-03-31 NOTE — PROGRESS NOTE ADULT - PROBLEM SELECTOR PLAN 3
Troponin 0.50, Likely demand ischemia ,  Was elevated even on last admission  Monitor on tele floor for now
Continue home medications   Dash diet

## 2021-03-31 NOTE — PROGRESS NOTE ADULT - PROBLEM SELECTOR PLAN 4
Continue home medications   Dash diet
Continue sliding scale   Diabetic diet

## 2021-03-31 NOTE — PROGRESS NOTE ADULT - PROBLEM SELECTOR PLAN 5
RISK                                                          Points  [] Previous VTE                                           3  [] Thrombophilia                                        2  [] Lower limb paralysis                              2   [] Current Cancer                                       2   [x] Immobilization > 24 hrs                        1  [] ICU/CCU stay > 24 hours                       1  [x] Age > 60                                                   1    Lovenox 40mg SQ
Continue sliding scale   Diabetic diet

## 2021-03-31 NOTE — PROGRESS NOTE ADULT - SUBJECTIVE AND OBJECTIVE BOX
PULMONARY  progress note    BRYAN LARA  MRN-[id number]    Patient is a 86y old  Male who presents with a chief complaint of Covid + Syncope (28 Mar 2021 08:45)  Feels better, no c/o pt      MEDICATIONS  (STANDING):  aspirin  chewable 81 milliGRAM(s) Oral daily  budesonide 160 MICROgram(s)/formoterol 4.5 MICROgram(s) Inhaler 2 Puff(s) Inhalation two times a day  carboxymethylcellulose 0.5% (Preservative-Free) Ophthalmic Solution 1 Drop(s) Both EYES every 4 hours  dexAMETHasone  Injectable 6 milliGRAM(s) IV Push daily  dextrose 40% Gel 15 Gram(s) Oral once  dextrose 50% Injectable 25 Gram(s) IV Push once  dextrose 50% Injectable 12.5 Gram(s) IV Push once  dextrose 50% Injectable 25 Gram(s) IV Push once  enoxaparin Injectable 40 milliGRAM(s) SubCutaneous daily  ferrous  sulfate 325 milliGRAM(s) Oral daily  glucagon  Injectable 1 milliGRAM(s) IntraMuscular once  guaifenesin/dextromethorphan  Syrup 10 milliLiter(s) Oral three times a day  hydrochlorothiazide 25 milliGRAM(s) Oral daily  insulin lispro (ADMELOG) corrective regimen sliding scale   SubCutaneous three times a day before meals  losartan 100 milliGRAM(s) Oral daily  mirtazapine 15 milliGRAM(s) Oral at bedtime  pantoprazole    Tablet 40 milliGRAM(s) Oral before breakfast  potassium chloride    Tablet ER 40 milliEquivalent(s) Oral daily  senna 2 Tablet(s) Oral at bedtime  simvastatin 20 milliGRAM(s) Oral at bedtime  sodium chloride 0.9%. 1000 milliLiter(s) (75 mL/Hr) IV Continuous <Continuous>      MEDICATIONS  (PRN):  ALBUTerol    90 MICROgram(s) HFA Inhaler 2 Puff(s) Inhalation every 6 hours PRN Shortness of Breath and/or Wheezing  benzonatate 100 milliGRAM(s) Oral three times a day PRN Cough  polyethylene glycol 3350 17 Gram(s) Oral daily PRN Constipation      Allergies    No Known Allergies          PAST MEDICAL & SURGICAL HISTORY:  Diabetes mellitus    HTN (hypertension)    Hypercholesterolemia    S/P hernia repair    History of prostate surgery             REVIEW OF SYSTEMS:  CONSTITUTIONAL: No fever, weight loss, or fatigue   EYES: No eye pain, visual disturbances, or discharge  ENT:  No difficulty hearing, tinnitus, vertigo; No sinus or throat pain  NECK: No pain or stiffness or nodes  RESPIRATORY:  cough--   wheezing --  chills--   hemoptysis--  Shortness of Breath--  CARDIOVASCULAR: No chest pain, palpitations, passing out, dizziness, or leg swelling  GASTROINTESTINAL: No abdominal or epigastric pain. No nausea, vomiting,   LYMPH Nodes: No enlarged glands  ENDOCRINE: No heat or cold intolerance; No hair loss  MUSCULOSKELETAL: No joint pain or swelling; No muscle, back, or extremity pain  PSYCHIATRIC: No depression, anxiety, mood swings, or difficulty sleeping  HEME/LYMPH: No easy bruising, or bleeding gums  ALLERGY AND IMMUNOLOGIC: No hives or eczema        Vital Signs Last 24 Hrs  T(C): 36.5 (31 Mar 2021 07:39), Max: 36.7 (30 Mar 2021 11:15)  T(F): 97.7 (31 Mar 2021 07:39), Max: 98 (30 Mar 2021 11:15)  HR: 79 (31 Mar 2021 07:39) (68 - 87)  BP: 144/77 (31 Mar 2021 07:39) (134/65 - 162/83)  BP(mean): --  RR: 18 (31 Mar 2021 07:39) (18 - 18)  SpO2: 97% (31 Mar 2021 07:39) (97% - 99%)  I&O's Detail      PHYSICAL EXAMINATION:    GENERAL: The patient is a well-developed, well-nourished in no apparent distress.   SKIN no rash ecchymoses or bruises  HEENT: Head is normocephalic and atraumatic  GALILEA , Extraocular muscles are intact. Mucous membranes  moist.   Neck supple ,No LN felt JVP not increased  Thyroid not enlarged  Cardiovascular:  S1 S2 heard, RSR, No JVD , systolic  murmur at apex, No gallop or rub  Respiratory: Chest wall symmetrical with good air entry ,Percussion note normal,    Lungs vesicular breathing with basilar   rales , no   wheeze	  ABDOMEN:  Soft, Non-tender,  no hepatomegaly or splenomegaly BS positive	  Extremities: Normal range of motion, No clubbing, cyanosis or edema  Vascular: Peripheral pulses palpable 2+ bilaterally  CNS:  Alert and oriented x3   Cranial nerves intact  sensory intact  motor power5/5  dtr 2+   Babinski neg    LABS:                        11.7   7.06  )-----------( 216      ( 31 Mar 2021 08:24 )             35.2     03-31    142  |  103  |  15  ----------------------------<  159<H>  3.8   |  30  |  0.88    Ca    8.6      31 Mar 2021 08:24  Phos  1.8     03-31  Mg     2.2     03-31    TPro  6.2  /  Alb  2.5<L>  /  TBili  0.5  /  DBili  x   /  AST  23  /  ALT  39  /  AlkPhos  83  03-31        D-Dimer Assay, Quantitative: 402 ng/mL DDU (03-30-21 @ 12:51)  Procalcitonin, Serum: 0.04 ng/mL (03-28-21 @ 12:09)  Ferritin, Serum: 559 ng/mL (03-30-21 @ 19:59)  Ferritin, Serum: 515 ng/mL (03-30-21 @ 14:04)      MICROBIOLOGY:    Culture - Blood (collected 03-27-21 @ 21:06)  Source: .Blood Blood  Preliminary Report (03-28-21 @ 22:02):    No growth to date.    Culture - Blood (collected 03-27-21 @ 21:06)  Source: .Blood Blood  Preliminary Report (03-28-21 @ 22:02):    No growth to date.

## 2021-03-31 NOTE — PROGRESS NOTE ADULT - SUBJECTIVE AND OBJECTIVE BOX
PGY-1 Progress Note discussed with attending    PAGER #: [[phone]] TILL 5:00 PM  PLEASE CONTACT ON CALL TEAM:  - On Call Team (Please refer to Toni) FROM 5:00 PM - 8:30PM  - Nightfloat Team FROM 8:30 -7:30 AM    CHIEF COMPLAINT & BRIEF HOSPITAL COURSE:  86 y.o. male with NIDDM, Covid positive recently discharged is coming to hospital as he was told that he fluids in his lungs. Patient was recently discharged home with home oxygen, however he reports that he cannot use oxygen all the time as the equipment is heavy.   Patient also reports that he had a fall few days ago while he was walking to the kitchen (w/o Oxygen). Patient remembers feeling dizzy at that time.  Pt also complains of generalized weakness, chills, dizziness when he gets up, and has chest pain with coughing. Patient is pending Discharge home with home oxygen.      INTERVAL HPI/OVERNIGHT EVENTS:   Patient seen and examined at bedside. No events overnight. Pt is stable, pending discharge.    REVIEW OF SYSTEMS:  CONSTITUTIONAL: No fever, weight loss, or fatigue  RESPIRATORY: No cough, wheezing, chills or hemoptysis; No shortness of breath  CARDIOVASCULAR: No chest pain, palpitations, dizziness, or leg swelling  GASTROINTESTINAL: No abdominal pain. No nausea, vomiting, or hematemesis; No diarrhea or constipation. No melena or hematochezia.  GENITOURINARY: No dysuria or hematuria, urinary frequency  MUSCULOSKELETAL: No pain, no Limited ROM  NEUROLOGICAL: No headaches, memory loss, loss of strength, numbness, or tremors  SKIN: No itching, burning, rashes, or lesions     MEDICATIONS  (STANDING):  aspirin  chewable 81 milliGRAM(s) Oral daily  budesonide 160 MICROgram(s)/formoterol 4.5 MICROgram(s) Inhaler 2 Puff(s) Inhalation two times a day  carboxymethylcellulose 0.5% (Preservative-Free) Ophthalmic Solution 1 Drop(s) Both EYES every 4 hours  dexAMETHasone  Injectable 6 milliGRAM(s) IV Push daily  dextrose 40% Gel 15 Gram(s) Oral once  dextrose 50% Injectable 12.5 Gram(s) IV Push once  dextrose 50% Injectable 25 Gram(s) IV Push once  dextrose 50% Injectable 25 Gram(s) IV Push once  enoxaparin Injectable 40 milliGRAM(s) SubCutaneous daily  ferrous    sulfate 325 milliGRAM(s) Oral daily  glucagon  Injectable 1 milliGRAM(s) IntraMuscular once  guaifenesin/dextromethorphan  Syrup 10 milliLiter(s) Oral three times a day  hydrochlorothiazide 25 milliGRAM(s) Oral daily  insulin lispro (ADMELOG) corrective regimen sliding scale   SubCutaneous three times a day before meals  losartan 100 milliGRAM(s) Oral daily  mirtazapine 15 milliGRAM(s) Oral at bedtime  pantoprazole    Tablet 40 milliGRAM(s) Oral before breakfast  potassium chloride    Tablet ER 40 milliEquivalent(s) Oral daily  senna 2 Tablet(s) Oral at bedtime  simvastatin 20 milliGRAM(s) Oral at bedtime  sodium chloride 0.9%. 1000 milliLiter(s) (75 mL/Hr) IV Continuous <Continuous>    MEDICATIONS  (PRN):  ALBUTerol    90 MICROgram(s) HFA Inhaler 2 Puff(s) Inhalation every 6 hours PRN Shortness of Breath and/or Wheezing  benzonatate 100 milliGRAM(s) Oral three times a day PRN Cough  polyethylene glycol 3350 17 Gram(s) Oral daily PRN Constipation      Vital Signs Last 24 Hrs  T(C): 36.6 (31 Mar 2021 11:27), Max: 36.7 (30 Mar 2021 20:15)  T(F): 97.8 (31 Mar 2021 11:27), Max: 98 (30 Mar 2021 20:15)  HR: 85 (31 Mar 2021 11:27) (68 - 87)  BP: 141/65 (31 Mar 2021 11:27) (141/65 - 162/83)  BP(mean): --  RR: 18 (31 Mar 2021 11:27) (18 - 18)  SpO2: 97% (31 Mar 2021 11:27) (97% - 99%)    PHYSICAL EXAMINATION:  GENERAL: NAD, well built  HEAD:  Atraumatic, Normocephalic  EYES:  conjunctiva and sclera clear, no scleral icterus  NECK: Supple, No JVD, Normal thyroid  CHEST/LUNG: Rales bilaterally  HEART: Regular rate and rhythm; No murmurs, rubs, or gallops  ABDOMEN: Soft, Nontender, Nondistended; Bowel sounds present  NERVOUS SYSTEM:  Alert & Oriented X3,  Strength 5/5 in upper and lower extremities, sensation intact  EXTREMITIES:  2+ Peripheral Pulses, No clubbing, cyanosis, or edema  SKIN: warm dry, no lesions noted                          11.7   7.06  )-----------( 216      ( 31 Mar 2021 08:24 )             35.2     03-31    142  |  103  |  15  ----------------------------<  159<H>  3.8   |  30  |  0.88    Ca    8.6      31 Mar 2021 08:24  Phos  1.8     03-31  Mg     2.2     03-31    TPro  6.2  /  Alb  2.5<L>  /  TBili  0.5  /  DBili  x   /  AST  23  /  ALT  39  /  AlkPhos  83  03-31    LIVER FUNCTIONS - ( 31 Mar 2021 08:24 )  Alb: 2.5 g/dL / Pro: 6.2 g/dL / ALK PHOS: 83 U/L / ALT: 39 U/L DA / AST: 23 U/L / GGT: x                 I&O's Summary        RADIOLOGY & ADDITIONAL TESTS:

## 2021-03-31 NOTE — DISCHARGE NOTE NURSING/CASE MANAGEMENT/SOCIAL WORK - PATIENT PORTAL LINK FT
You can access the FollowMyHealth Patient Portal offered by [facility] by registering at the following website: [email or web address]. By joining [employer]’s FollowMyHealth portal, you will also be able to view your health information using other applications (apps) compatible with our system.

## 2021-03-31 NOTE — PROGRESS NOTE ADULT - ASSESSMENT
86 y.o. male with NIDDM, Covid positive recently discharged is coming to hospital as he was told that he fluids in his lungs. Pt also had a fall recently and complains of dizzines. Admitted for Covid Pna and syncope workup
86 y.o. male with NIDDM, Covid positive recently discharged is coming to hospital as he was told that he fluids in his lungs. Pt also had a fall recently and complains of dizzines. Admitted for Covid Pna and syncope workup
B/L Covid Pneumonia Rt > Lt   NIDDM   Htn   HLD   Prostate surgery Hx   anemia      PLAN--    O2 n/l 2 lpm   Po dexamethasone 6 mg qd x 10 days   [id number]/4.5 2 puffs bid   FS Coverage   s/c lovenox   f/u PCR and Xray chest  
B/L Covid Pneumonia Rt > Lt   NIDDM   Htn   HLD   Prostate surgery Hx   anemia      PLAN--    O2 n/l 2-3 lpm   Po dexamethasone 6 mg qd x 10 days   [id number]/4.5 2 puffs bid   FS Coverage   s/c lovenox   f/u PCR  
86 y.o. male with NIDDM, Covid positive recently discharged is coming to hospital as he was told that he fluids in his lungs. Pt also had a fall recently and complains of dizzines. Admitted for Covid Pna and syncope workup
86 y.o. male with NIDDM, Covid positive recently discharged is coming to hospital as he was told that he fluids in his lungs. Pt also had a fall recently and complains of dizzines. Admitted for Covid Pna and syncope workup

## 2021-04-01 LAB
CRP SERPL-MCNC: 17 MG/L — HIGH
CULTURE RESULTS: SIGNIFICANT CHANGE UP
CULTURE RESULTS: SIGNIFICANT CHANGE UP
SPECIMEN SOURCE: SIGNIFICANT CHANGE UP
SPECIMEN SOURCE: SIGNIFICANT CHANGE UP

## 2021-04-02 LAB
A-TUMOR NECROSIS FACT SERPL-MCNC: <1.7 PG/ML — SIGNIFICANT CHANGE UP
IL10 SERPL-MCNC: <2.8 PG/ML — SIGNIFICANT CHANGE UP
IL12 SERPL-MCNC: <1.9 PG/ML — SIGNIFICANT CHANGE UP
IL13 SERPL-MCNC: <1.7 PG/ML — SIGNIFICANT CHANGE UP
IL17A SERPL-MCNC: <1.4 PG/ML — SIGNIFICANT CHANGE UP
IL2 SERPL-MCNC: 1071.2 PG/ML — HIGH (ref 175.3–858.2)
IL2 SERPL-MCNC: <2.1 PG/ML — SIGNIFICANT CHANGE UP
IL4 SERPL-MCNC: <2.2 PG/ML — SIGNIFICANT CHANGE UP
IL6 SERPL-MCNC: <2 PG/ML — SIGNIFICANT CHANGE UP
IL8 SERPL-MCNC: <3 PG/ML — SIGNIFICANT CHANGE UP
INTERFERON GAMMA, BLOOD: <4.2 PG/ML — SIGNIFICANT CHANGE UP
INTERLEUKIN 1 BETA: <6.5 PG/ML — SIGNIFICANT CHANGE UP
INTERLEUKIN 5: <2.1 PG/ML — SIGNIFICANT CHANGE UP

## 2021-06-01 ENCOUNTER — APPOINTMENT (OUTPATIENT)
Dept: INTERNAL MEDICINE | Facility: CLINIC | Age: 86
End: 2021-06-01

## 2021-06-03 PROCEDURE — 85025 COMPLETE CBC W/AUTO DIFF WBC: CPT

## 2021-06-03 PROCEDURE — 82746 ASSAY OF FOLIC ACID SERUM: CPT

## 2021-06-03 PROCEDURE — 94640 AIRWAY INHALATION TREATMENT: CPT

## 2021-06-03 PROCEDURE — 80061 LIPID PANEL: CPT

## 2021-06-03 PROCEDURE — 86140 C-REACTIVE PROTEIN: CPT

## 2021-06-03 PROCEDURE — 84145 PROCALCITONIN (PCT): CPT

## 2021-06-03 PROCEDURE — 83036 HEMOGLOBIN GLYCOSYLATED A1C: CPT

## 2021-06-03 PROCEDURE — 80048 BASIC METABOLIC PNL TOTAL CA: CPT

## 2021-06-03 PROCEDURE — 71275 CT ANGIOGRAPHY CHEST: CPT

## 2021-06-03 PROCEDURE — 71045 X-RAY EXAM CHEST 1 VIEW: CPT

## 2021-06-03 PROCEDURE — 93005 ELECTROCARDIOGRAM TRACING: CPT

## 2021-06-03 PROCEDURE — 82553 CREATINE MB FRACTION: CPT

## 2021-06-03 PROCEDURE — 85027 COMPLETE CBC AUTOMATED: CPT

## 2021-06-03 PROCEDURE — 86769 SARS-COV-2 COVID-19 ANTIBODY: CPT

## 2021-06-03 PROCEDURE — 85379 FIBRIN DEGRADATION QUANT: CPT

## 2021-06-03 PROCEDURE — 83605 ASSAY OF LACTIC ACID: CPT

## 2021-06-03 PROCEDURE — 85384 FIBRINOGEN ACTIVITY: CPT

## 2021-06-03 PROCEDURE — 84443 ASSAY THYROID STIM HORMONE: CPT

## 2021-06-03 PROCEDURE — 84484 ASSAY OF TROPONIN QUANT: CPT

## 2021-06-03 PROCEDURE — 83735 ASSAY OF MAGNESIUM: CPT

## 2021-06-03 PROCEDURE — 83540 ASSAY OF IRON: CPT

## 2021-06-03 PROCEDURE — 85730 THROMBOPLASTIN TIME PARTIAL: CPT

## 2021-06-03 PROCEDURE — 0225U NFCT DS DNA&RNA 21 SARSCOV2: CPT

## 2021-06-03 PROCEDURE — 85652 RBC SED RATE AUTOMATED: CPT

## 2021-06-03 PROCEDURE — 99285 EMERGENCY DEPT VISIT HI MDM: CPT | Mod: 25

## 2021-06-03 PROCEDURE — 85610 PROTHROMBIN TIME: CPT

## 2021-06-03 PROCEDURE — 80053 COMPREHEN METABOLIC PANEL: CPT

## 2021-06-03 PROCEDURE — 82962 GLUCOSE BLOOD TEST: CPT

## 2021-06-03 PROCEDURE — 36415 COLL VENOUS BLD VENIPUNCTURE: CPT

## 2021-06-03 PROCEDURE — 83550 IRON BINDING TEST: CPT

## 2021-06-03 PROCEDURE — 84100 ASSAY OF PHOSPHORUS: CPT

## 2021-06-03 PROCEDURE — 83880 ASSAY OF NATRIURETIC PEPTIDE: CPT

## 2021-06-03 PROCEDURE — 82728 ASSAY OF FERRITIN: CPT

## 2021-06-03 PROCEDURE — 82607 VITAMIN B-12: CPT

## 2021-06-03 PROCEDURE — 82550 ASSAY OF CK (CPK): CPT

## 2021-06-03 PROCEDURE — 83615 LACTATE (LD) (LDH) ENZYME: CPT

## 2022-06-15 ENCOUNTER — INPATIENT (INPATIENT)
Facility: HOSPITAL | Age: 87
LOS: 5 days | Discharge: HOME | DRG: 178 | End: 2022-06-21
Attending: INTERNAL MEDICINE | Admitting: INTERNAL MEDICINE
Payer: MEDICARE

## 2022-06-15 VITALS
RESPIRATION RATE: 17 BRPM | HEART RATE: 86 BPM | WEIGHT: 145.06 LBS | SYSTOLIC BLOOD PRESSURE: 172 MMHG | DIASTOLIC BLOOD PRESSURE: 84 MMHG | HEIGHT: 66 IN | OXYGEN SATURATION: 93 % | TEMPERATURE: 98 F

## 2022-06-15 DIAGNOSIS — Z29.9 ENCOUNTER FOR PROPHYLACTIC MEASURES, UNSPECIFIED: ICD-10-CM

## 2022-06-15 DIAGNOSIS — I10 ESSENTIAL (PRIMARY) HYPERTENSION: ICD-10-CM

## 2022-06-15 DIAGNOSIS — E78.5 HYPERLIPIDEMIA, UNSPECIFIED: ICD-10-CM

## 2022-06-15 DIAGNOSIS — Z98.890 OTHER SPECIFIED POSTPROCEDURAL STATES: Chronic | ICD-10-CM

## 2022-06-15 DIAGNOSIS — R77.8 OTHER SPECIFIED ABNORMALITIES OF PLASMA PROTEINS: ICD-10-CM

## 2022-06-15 DIAGNOSIS — J98.6 DISORDERS OF DIAPHRAGM: ICD-10-CM
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
## 2022-06-15 DIAGNOSIS — U07.1 COVID-19: ICD-10-CM

## 2022-06-15 DIAGNOSIS — E11.9 TYPE 2 DIABETES MELLITUS WITHOUT COMPLICATIONS: ICD-10-CM

## 2022-06-15 PROBLEM — E78.00 PURE HYPERCHOLESTEROLEMIA, UNSPECIFIED: Chronic | Status: ACTIVE | Noted: 2021-03-27

## 2022-06-15 LAB
ACETONE SERPL-MCNC: NEGATIVE — SIGNIFICANT CHANGE UP
ALBUMIN SERPL ELPH-MCNC: 3.1 G/DL — LOW (ref 3.5–5)
ALP SERPL-CCNC: 81 U/L — SIGNIFICANT CHANGE UP (ref 40–120)
ALT FLD-CCNC: 16 U/L DA — SIGNIFICANT CHANGE UP (ref 10–60)
ANION GAP SERPL CALC-SCNC: 4 MMOL/L — LOW (ref 5–17)
APPEARANCE UR: CLEAR — SIGNIFICANT CHANGE UP
AST SERPL-CCNC: 14 U/L — SIGNIFICANT CHANGE UP (ref 10–40)
BACTERIA # UR AUTO: ABNORMAL /HPF
BASOPHILS # BLD AUTO: 0.01 K/UL — SIGNIFICANT CHANGE UP (ref 0–0.2)
BASOPHILS NFR BLD AUTO: 0.2 % — SIGNIFICANT CHANGE UP (ref 0–2)
BILIRUB SERPL-MCNC: 0.5 MG/DL — SIGNIFICANT CHANGE UP (ref 0.2–1.2)
BILIRUB UR-MCNC: NEGATIVE — SIGNIFICANT CHANGE UP
BUN SERPL-MCNC: 15 MG/DL — SIGNIFICANT CHANGE UP (ref 7–18)
CALCIUM SERPL-MCNC: 8.2 MG/DL — LOW (ref 8.4–10.5)
CHLORIDE SERPL-SCNC: 108 MMOL/L — SIGNIFICANT CHANGE UP (ref 96–108)
CK SERPL-CCNC: 65 U/L — SIGNIFICANT CHANGE UP (ref 35–232)
CO2 SERPL-SCNC: 26 MMOL/L — SIGNIFICANT CHANGE UP (ref 22–31)
COLOR SPEC: YELLOW — SIGNIFICANT CHANGE UP
CREAT SERPL-MCNC: 0.82 MG/DL — SIGNIFICANT CHANGE UP (ref 0.5–1.3)
D DIMER BLD IA.RAPID-MCNC: 418 NG/ML DDU — HIGH
DIFF PNL FLD: NEGATIVE — SIGNIFICANT CHANGE UP
EGFR: 86 ML/MIN/1.73M2 — SIGNIFICANT CHANGE UP
EOSINOPHIL # BLD AUTO: 0.01 K/UL — SIGNIFICANT CHANGE UP (ref 0–0.5)
EOSINOPHIL NFR BLD AUTO: 0.2 % — SIGNIFICANT CHANGE UP (ref 0–6)
GLUCOSE SERPL-MCNC: 134 MG/DL — HIGH (ref 70–99)
GLUCOSE UR QL: 1000 MG/DL
HCT VFR BLD CALC: 42.7 % — SIGNIFICANT CHANGE UP (ref 39–50)
HGB BLD-MCNC: 14 G/DL — SIGNIFICANT CHANGE UP (ref 13–17)
HIV 1 & 2 AB SERPL IA.RAPID: SIGNIFICANT CHANGE UP
IMM GRANULOCYTES NFR BLD AUTO: 0.5 % — SIGNIFICANT CHANGE UP (ref 0–1.5)
KETONES UR-MCNC: NEGATIVE — SIGNIFICANT CHANGE UP
LACTATE SERPL-SCNC: 1.7 MMOL/L — SIGNIFICANT CHANGE UP (ref 0.7–2)
LEUKOCYTE ESTERASE UR-ACNC: NEGATIVE — SIGNIFICANT CHANGE UP
LYMPHOCYTES # BLD AUTO: 0.77 K/UL — LOW (ref 1–3.3)
LYMPHOCYTES # BLD AUTO: 11.7 % — LOW (ref 13–44)
MAGNESIUM SERPL-MCNC: 2.2 MG/DL — SIGNIFICANT CHANGE UP (ref 1.6–2.6)
MCHC RBC-ENTMCNC: 30.8 PG — SIGNIFICANT CHANGE UP (ref 27–34)
MCHC RBC-ENTMCNC: 32.8 GM/DL — SIGNIFICANT CHANGE UP (ref 32–36)
MCV RBC AUTO: 94.1 FL — SIGNIFICANT CHANGE UP (ref 80–100)
MONOCYTES # BLD AUTO: 0.74 K/UL — SIGNIFICANT CHANGE UP (ref 0–0.9)
MONOCYTES NFR BLD AUTO: 11.2 % — SIGNIFICANT CHANGE UP (ref 2–14)
NEUTROPHILS # BLD AUTO: 5.04 K/UL — SIGNIFICANT CHANGE UP (ref 1.8–7.4)
NEUTROPHILS NFR BLD AUTO: 76.2 % — SIGNIFICANT CHANGE UP (ref 43–77)
NITRITE UR-MCNC: NEGATIVE — SIGNIFICANT CHANGE UP
NRBC # BLD: 0 /100 WBCS — SIGNIFICANT CHANGE UP (ref 0–0)
NT-PROBNP SERPL-SCNC: 374 PG/ML — SIGNIFICANT CHANGE UP (ref 0–450)
PH UR: 7 — SIGNIFICANT CHANGE UP (ref 5–8)
PLATELET # BLD AUTO: 176 K/UL — SIGNIFICANT CHANGE UP (ref 150–400)
POTASSIUM SERPL-MCNC: 4.2 MMOL/L — SIGNIFICANT CHANGE UP (ref 3.5–5.3)
POTASSIUM SERPL-SCNC: 4.2 MMOL/L — SIGNIFICANT CHANGE UP (ref 3.5–5.3)
PROT SERPL-MCNC: 6.2 G/DL — SIGNIFICANT CHANGE UP (ref 6–8.3)
PROT UR-MCNC: 15
RAPID RVP RESULT: DETECTED
RBC # BLD: 4.54 M/UL — SIGNIFICANT CHANGE UP (ref 4.2–5.8)
RBC # FLD: 12.6 % — SIGNIFICANT CHANGE UP (ref 10.3–14.5)
RBC CASTS # UR COMP ASSIST: SIGNIFICANT CHANGE UP /HPF (ref 0–2)
SARS-COV-2 RNA SPEC QL NAA+PROBE: DETECTED
SODIUM SERPL-SCNC: 138 MMOL/L — SIGNIFICANT CHANGE UP (ref 135–145)
SP GR SPEC: 1 — LOW (ref 1.01–1.02)
TROPONIN I, HIGH SENSITIVITY RESULT: 106 NG/L — HIGH
TSH SERPL-MCNC: 1.24 UU/ML — SIGNIFICANT CHANGE UP (ref 0.34–4.82)
UROBILINOGEN FLD QL: NEGATIVE — SIGNIFICANT CHANGE UP
WBC # BLD: 6.6 K/UL — SIGNIFICANT CHANGE UP (ref 3.8–10.5)
WBC # FLD AUTO: 6.6 K/UL — SIGNIFICANT CHANGE UP (ref 3.8–10.5)
WBC UR QL: SIGNIFICANT CHANGE UP /HPF (ref 0–5)

## 2022-06-15 PROCEDURE — 71045 X-RAY EXAM CHEST 1 VIEW: CPT | Mod: 26

## 2022-06-15 PROCEDURE — 99223 1ST HOSP IP/OBS HIGH 75: CPT | Mod: GC

## 2022-06-15 PROCEDURE — 99285 EMERGENCY DEPT VISIT HI MDM: CPT | Mod: CS

## 2022-06-15 RX ORDER — ASPIRIN/CALCIUM CARB/MAGNESIUM 324 MG
162 TABLET ORAL ONCE
Refills: 0 | Status: COMPLETED | OUTPATIENT
Start: 2022-06-15 | End: 2022-06-15

## 2022-06-15 RX ORDER — OLMESARTAN MEDOXOMIL 5 MG/1
1 TABLET, FILM COATED ORAL
Qty: 0 | Refills: 0 | DISCHARGE

## 2022-06-15 RX ORDER — ENOXAPARIN SODIUM 100 MG/ML
40 INJECTION SUBCUTANEOUS EVERY 24 HOURS
Refills: 0 | Status: DISCONTINUED | OUTPATIENT
Start: 2022-06-15 | End: 2022-06-21

## 2022-06-15 RX ORDER — AZITHROMYCIN 500 MG/1
500 TABLET, FILM COATED ORAL ONCE
Refills: 0 | Status: COMPLETED | OUTPATIENT
Start: 2022-06-15 | End: 2022-06-15

## 2022-06-15 RX ORDER — SITAGLIPTIN 50 MG/1
1 TABLET, FILM COATED ORAL
Qty: 0 | Refills: 0 | DISCHARGE

## 2022-06-15 RX ORDER — METOPROLOL TARTRATE 50 MG
1 TABLET ORAL
Qty: 0 | Refills: 0 | DISCHARGE

## 2022-06-15 RX ORDER — FUROSEMIDE 40 MG
1 TABLET ORAL
Qty: 0 | Refills: 0 | DISCHARGE

## 2022-06-15 RX ORDER — SIMVASTATIN 20 MG/1
1 TABLET, FILM COATED ORAL
Qty: 0 | Refills: 0 | DISCHARGE

## 2022-06-15 RX ORDER — ACETAMINOPHEN 500 MG
650 TABLET ORAL ONCE
Refills: 0 | Status: COMPLETED | OUTPATIENT
Start: 2022-06-15 | End: 2022-06-15

## 2022-06-15 RX ORDER — ASCORBIC ACID 60 MG
1 TABLET,CHEWABLE ORAL
Qty: 0 | Refills: 0 | DISCHARGE

## 2022-06-15 RX ORDER — PANTOPRAZOLE SODIUM 20 MG/1
1 TABLET, DELAYED RELEASE ORAL
Qty: 0 | Refills: 0 | DISCHARGE

## 2022-06-15 RX ORDER — PANTOPRAZOLE SODIUM 20 MG/1
40 TABLET, DELAYED RELEASE ORAL
Refills: 0 | Status: DISCONTINUED | OUTPATIENT
Start: 2022-06-15 | End: 2022-06-21

## 2022-06-15 RX ORDER — CHROMIUM PICOLINATE 200 MCG
1 TABLET ORAL
Qty: 0 | Refills: 0 | DISCHARGE

## 2022-06-15 RX ORDER — LANOLIN ALCOHOL/MO/W.PET/CERES
1 CREAM (GRAM) TOPICAL
Qty: 0 | Refills: 0 | DISCHARGE

## 2022-06-15 RX ORDER — AMLODIPINE BESYLATE 2.5 MG/1
1 TABLET ORAL
Qty: 0 | Refills: 0 | DISCHARGE

## 2022-06-15 RX ORDER — SODIUM CHLORIDE 9 MG/ML
1000 INJECTION INTRAMUSCULAR; INTRAVENOUS; SUBCUTANEOUS
Refills: 0 | Status: DISCONTINUED | OUTPATIENT
Start: 2022-06-15 | End: 2022-06-17

## 2022-06-15 RX ORDER — ASPIRIN/CALCIUM CARB/MAGNESIUM 324 MG
1 TABLET ORAL
Qty: 0 | Refills: 0 | DISCHARGE

## 2022-06-15 RX ORDER — CHOLECALCIFEROL (VITAMIN D3) 125 MCG
1 CAPSULE ORAL
Qty: 0 | Refills: 0 | DISCHARGE

## 2022-06-15 RX ORDER — PREGABALIN 225 MG/1
2 CAPSULE ORAL
Qty: 0 | Refills: 0 | DISCHARGE

## 2022-06-15 RX ORDER — MIRTAZAPINE 45 MG/1
1 TABLET, ORALLY DISINTEGRATING ORAL
Qty: 0 | Refills: 0 | DISCHARGE

## 2022-06-15 RX ORDER — INSULIN LISPRO 100/ML
VIAL (ML) SUBCUTANEOUS AT BEDTIME
Refills: 0 | Status: DISCONTINUED | OUTPATIENT
Start: 2022-06-15 | End: 2022-06-21

## 2022-06-15 RX ORDER — INSULIN LISPRO 100/ML
VIAL (ML) SUBCUTANEOUS
Refills: 0 | Status: DISCONTINUED | OUTPATIENT
Start: 2022-06-15 | End: 2022-06-21

## 2022-06-15 RX ORDER — CEFTRIAXONE 500 MG/1
1000 INJECTION, POWDER, FOR SOLUTION INTRAMUSCULAR; INTRAVENOUS ONCE
Refills: 0 | Status: COMPLETED | OUTPATIENT
Start: 2022-06-15 | End: 2022-06-15

## 2022-06-15 RX ORDER — LANOLIN ALCOHOL/MO/W.PET/CERES
3 CREAM (GRAM) TOPICAL AT BEDTIME
Refills: 0 | Status: DISCONTINUED | OUTPATIENT
Start: 2022-06-15 | End: 2022-06-21

## 2022-06-15 RX ORDER — METFORMIN HYDROCHLORIDE 850 MG/1
1 TABLET ORAL
Qty: 0 | Refills: 0 | DISCHARGE

## 2022-06-15 RX ORDER — EMPAGLIFLOZIN 10 MG/1
1 TABLET, FILM COATED ORAL
Qty: 0 | Refills: 0 | DISCHARGE

## 2022-06-15 RX ORDER — LOSARTAN POTASSIUM 100 MG/1
100 TABLET, FILM COATED ORAL DAILY
Refills: 0 | Status: DISCONTINUED | OUTPATIENT
Start: 2022-06-15 | End: 2022-06-21

## 2022-06-15 RX ORDER — ASPIRIN/CALCIUM CARB/MAGNESIUM 324 MG
81 TABLET ORAL DAILY
Refills: 0 | Status: DISCONTINUED | OUTPATIENT
Start: 2022-06-15 | End: 2022-06-21

## 2022-06-15 RX ORDER — METOPROLOL TARTRATE 50 MG
25 TABLET ORAL DAILY
Refills: 0 | Status: DISCONTINUED | OUTPATIENT
Start: 2022-06-15 | End: 2022-06-19

## 2022-06-15 RX ORDER — FUROSEMIDE 40 MG
20 TABLET ORAL DAILY
Refills: 0 | Status: DISCONTINUED | OUTPATIENT
Start: 2022-06-15 | End: 2022-06-17

## 2022-06-15 RX ORDER — AMLODIPINE BESYLATE 2.5 MG/1
5 TABLET ORAL DAILY
Refills: 0 | Status: DISCONTINUED | OUTPATIENT
Start: 2022-06-15 | End: 2022-06-21

## 2022-06-15 RX ORDER — SIMVASTATIN 20 MG/1
20 TABLET, FILM COATED ORAL AT BEDTIME
Refills: 0 | Status: DISCONTINUED | OUTPATIENT
Start: 2022-06-15 | End: 2022-06-21

## 2022-06-15 RX ADMIN — CEFTRIAXONE 100 MILLIGRAM(S): 500 INJECTION, POWDER, FOR SOLUTION INTRAMUSCULAR; INTRAVENOUS at 15:48

## 2022-06-15 RX ADMIN — Medication 650 MILLIGRAM(S): at 15:45

## 2022-06-15 RX ADMIN — SODIUM CHLORIDE 125 MILLILITER(S): 9 INJECTION INTRAMUSCULAR; INTRAVENOUS; SUBCUTANEOUS at 15:46

## 2022-06-15 RX ADMIN — AZITHROMYCIN 255 MILLIGRAM(S): 500 TABLET, FILM COATED ORAL at 16:28

## 2022-06-15 RX ADMIN — Medication 650 MILLIGRAM(S): at 16:51

## 2022-06-15 RX ADMIN — Medication 162 MILLIGRAM(S): at 16:28

## 2022-06-15 NOTE — H&P ADULT - NSHPREVIEWOFSYSTEMS_GEN_ALL_CORE
REVIEW OF SYSTEMS:    CONSTITUTIONAL: + weakness, +  chills  EYES/ENT: No visual changes;  No vertigo or throat pain   NECK: No pain or stiffness  RESPIRATORY: + cough, wheezing, hemoptysis; +chronic shortness of breath  CARDIOVASCULAR: No chest pain or palpitations  GASTROINTESTINAL: No abdominal or epigastric pain. No nausea, vomiting, or hematemesis; No diarrhea or constipation. No melena or hematochezia.  GENITOURINARY: No dysuria, frequency or hematuria  NEUROLOGICAL: No numbness or weakness  SKIN: No itching, rashes

## 2022-06-15 NOTE — H&P ADULT - ASSESSMENT
87 y.o. male with h/o NIDDM, HTN, obesity, CVI,  non-vax,  p/w  2 days,  weakness, low PO.  Also presents with a dry cough 2 days complicated by fall,  with girl friend as sick contact, similar episode in the past 3/15/2021 for COVID with  now found with NAD, mild edema, vital stable with some hypertension, NSR, troponin 106, BNP wnl, COVID,  Evelyn-CoV 2 +ve    Admitted for trops and supportive care of COVID

## 2022-06-15 NOTE — H&P ADULT - NSHPPHYSICALEXAM_GEN_ALL_CORE
T(C): 37.7 (06-15-22 @ 15:33), Max: 37.7 (06-15-22 @ 15:33)  T(F): 99.9 (06-15-22 @ 15:33), Max: 99.9 (06-15-22 @ 15:33)  HR: 80 (06-15-22 @ 15:33) (80 - 86)  BP: 155/73 (06-15-22 @ 15:33) (155/73 - 172/84)  RR: 19 (06-15-22 @ 15:33) (17 - 19)  SpO2: 95% (06-15-22 @ 15:33) (93% - 95%)    GENERAL: NAD,sitting at the edge  HEAD:  Atraumatic, Normocephalic  EYES: EOMI, PERRLA, conjunctiva and sclera clear  ENT: Moist mucous membranes  NECK: Supple, No JVD  CHEST/LUNG: Clear to auscultation bilaterally; + coughing, or rubs. Unlabored respirations  HEART: Regular rate and rhythm; No murmurs, rubs, or gallops  ABDOMEN: Bowel sounds present; Soft, Nontender, Nondistended. No hepatomegally  EXTREMITIES:  2+ Peripheral Pulses, brisk capillary refill. No clubbing, cyanosis, or edema  NERVOUS SYSTEM:  Alert & Oriented X3, speech clear. No deficits   MSK: FROM all 4 extremities, full and equal strength  SKIN: No rashes or lesions

## 2022-06-15 NOTE — H&P ADULT - PROBLEM SELECTOR PLAN 1
P/w weakness in the legs  complicated by fall, found to have Evelyn CoV 2 +ve   No chest pain  no hx of MI  trop 106 BNP WNL  -tele  -trend again   -echo

## 2022-06-15 NOTE — ED ADULT NURSE NOTE - VOIDING
Prednisone 3mg daily x1week, then 2mg daily x1week, then 1mg daily x1week, then stop.      Keep taking methotrexate 10mg (4 tablets) once weekly    Keep taking folic acid 1mg daily    Talk with your dentist about completing any dental work so that Fosamax (alendronate) may be started.     Rheumatology    Dr. Niles Bateman [facility]   (Monday)  [address] [facility] [address]  [place] [address]       [facility]   (Tuesday)  [address]  [place] [address]    [facility]   (Wed., Thurs., and Friday)  [address] [place] [address]    Phone number: [phone]  Thank you for choosing [facility].  Kinjal Quinn [facility]     Applied without difficulty

## 2022-06-15 NOTE — H&P ADULT - HISTORY OF PRESENT ILLNESS
87 y.o. male with h/o NIDDM, last dose this am, BIBA as per HHA pt with fever for 2 days, chills, dry cough, weakness, no appetite, no sob, n/v, dysuria.  Pt fell backward while walking down the stairs, denies any injury, LOC, pt never had COVID vaccine.  Pt's girlfriend had fever on Sun.  Pt took Tylenol @ 8am    in the ED: /84 HR 84 sats 93% on RA  EKG: NSR, with PRWP V1-V4  Exam:   Labs: unremarkable other than troponemia 106, BNP wnl, COVID,  Evelyn-CoV 2 +ve 87 y.o. male with h/o NIDDM, HTN, obesity, CVI,  non-vax,  p/w  2 days,  weakness, low PO.  Also presents with a dry cough 2 days prompting him to go down the stairs (due to broken elevator) to get COVID tested,  at which point he fell on his buttock.   States that yesterday  his legs felt weak, and he decided to "sit down" on the steps. Denies any injury, LOC. NO travel.  Pt's girlfriend had fever on Sun, visited over the weekend. Patient felt warm this morning, and took tylenol.  Patient has had similar symptoms when he was admitted 3/15/2021 for COVID with superimposed PNA, that required oxygen, and had "fluid in the lungs" requiring diuretic, Pt was eventually dc on home O2. Now he reports that since then he has chronic shortness of breath after walking a couple blocks, chest pain, sob at rest, changes in bowel bladder function.     in the ED: /84 HR 84 sats 93% on RA  EKG: NSR, with PRWP V1-V4  Exam: 2+ pitting edema otherwise unremarkable  Labs: unremarkable other than troponin 106, BNP wnl,  Evelyn-CoV 2 +ve    Admitted for trops and supportive care of COVID

## 2022-06-15 NOTE — H&P ADULT - ATTENDING COMMENTS
87 y.o. male with h/o NIDDM, HTN, obesity, CVI,  non-vax,  p/w  2 days,  weakness, low PO.  Also presents with a dry cough 2 days prompting him to go down the stairs (due to broken elevator) to get COVID tested,  at which point he fell on his buttock.   States that yesterday  his legs felt weak, and he decided to "sit down" on the steps. Denies any injury, LOC. NO travel.  Pt's girlfriend had fever on Sun, visited over the weekend. Patient felt warm this morning, and took tylenol.  Patient has had similar symptoms when he was admitted 3/15/2021 for COVID with superimposed PNA, that required oxygen, and had "fluid in the lungs" requiring diuretic, Pt was eventually dc on home O2. Now he reports that since then he has chronic shortness of breath after walking a couple blocks, chest pain, sob at rest, changes in bowel bladder function.     Feeling better over the last several hours.     Alert man, in NAD  Vital Signs Last 24 Hrs  T(C): 37.7 (15 Kei 2022 15:33), Max: 37.7 (15 Kei 2022 15:33)  T(F): 99.9 (15 Kei 2022 15:33), Max: 99.9 (15 Kei 2022 15:33)  HR: 80 (15 Kei 2022 15:33) (80 - 86)  BP: 155/73 (15 Kei 2022 15:33) (155/73 - 172/84)  BP(mean): --  RR: 19 (15 Kei 2022 15:33) (17 - 19)  SpO2: 95% (15 Kei 2022 15:33) (93% - 95%)  Lungs, clear  Cor, RRR  ABdomen, soft  Neurological, intact                        14.0   6.60  )-----------( 176      ( 15 Kei 2022 15:12 )             42.7   06-15    138  |  108  |  15  ----------------------------<  134<H>  4.2   |  26  |  0.82    Ca    8.2<L>      15 Kei 2022 15:12  Mg     2.2     06-15    TPro  6.2  /  Alb  3.1<L>  /  TBili  0.5  /  DBili  x   /  AST  14  /  ALT  16  /  AlkPhos  81  06-15  < from: Xray Chest 1 View-PORTABLE IMMEDIATE (06.15.22 @ 17:01) >    IMPRESSION:  1. Discoid atelectasis in the left lower lobe is not present on the prior   exam.  2. Elevated right hemidiaphragm  3. No evidence of pneumonia, pleural effusion or pulmonary edema, however   there are areas of nodularity along the right heart border, likely in the   medial segment of the right middle lobe, which could representsequela   from previous Covid and is suggested on the prior exam as well.    < end of copied text >    EKG: NSR, with PRWP V1-V4  Exam: 2+ pitting edema otherwise unremarkable  Labs: unremarkable other than troponin 106, BNP wnl,  Evelyn-CoV 2 +ve    IMP: Admitted for trops and supportive care of COVID; CXR and sats are much improved from last year         elevated right hemidiaphragm  Plan: Encourage oral intake          Repeat CXR in AM to evaluate right hemidiapragm.          If he continues to improve, early discharge with [facility] post-COVID care.

## 2022-06-15 NOTE — ED PROVIDER NOTE - NSTIMEPROVIDERCAREINITIATE_GEN_ER
Quality 226: Preventive Care And Screening: Tobacco Use: Screening And Cessation Intervention: Patient screened for tobacco use and is an ex/non-smoker Quality 431: Preventive Care And Screening: Unhealthy Alcohol Use - Screening: Patient screened for unhealthy alcohol use using a single question and scores less than 2 times per year Detail Level: Detailed 15-Kei-2022 14:08

## 2022-06-15 NOTE — ED PROVIDER NOTE - OBJECTIVE STATEMENT
87 y.o. male with h/o NIDDM, last dose this am, BIBA as per HHA pt with fever for 2 days, chills, dry cough, weakness, no appetite, no sob, n/v, dysuria.  Pt fell backward while walking down the stairs, denies any injury, LOC, pt never had COVID vaccine.  Pt's girlfriend had fever on Sun.  Pt took Tylenol @ 8am

## 2022-06-15 NOTE — ED PROVIDER NOTE - CLINICAL SUMMARY MEDICAL DECISION MAKING FREE TEXT BOX
fever, coughing, weakness, concern for infectious process, will get labs, give meds., poss admission

## 2022-06-15 NOTE — H&P ADULT - PROBLEM SELECTOR PROBLEM 7
DVT prophylaxis [Alert] : alert [No Acute Distress] : no acute distress [Normocephalic] : normocephalic [EOMI Bilateral] : EOMI bilateral [Clear tympanic membranes with bony landmarks and light reflex present bilaterally] : clear tympanic membranes with bony landmarks and light reflex present bilaterally  [Pink Nasal Mucosa] : pink nasal mucosa [Nonerythematous Oropharynx] : nonerythematous oropharynx [Supple, full passive range of motion] : supple, full passive range of motion [No Palpable Masses] : no palpable masses [Clear to Auscultation Bilaterally] : clear to auscultation bilaterally [Regular Rate and Rhythm] : regular rate and rhythm [Normal S1, S2 audible] : normal S1, S2 audible [No Murmurs] : no murmurs [+2 Femoral Pulses] : +2 femoral pulses [Soft] : soft [NonTender] : non tender [Non Distended] : non distended [Normoactive Bowel Sounds] : normoactive bowel sounds [No Hepatomegaly] : no hepatomegaly [No Splenomegaly] : no splenomegaly [Marcus: ____] : Marcus [unfilled] [No Masses] : no masses [Marcus: _____] : Marcus [unfilled] [No Abnormal Lymph Nodes Palpated] : no abnormal lymph nodes palpated [Normal Muscle Tone] : normal muscle tone [No Gait Asymmetry] : no gait asymmetry [No pain or deformities with palpation of bone, muscles, joints] : no pain or deformities with palpation of bone, muscles, joints [Straight] : straight [+2 Patella DTR] : +2 patella DTR [Cranial Nerves Grossly Intact] : cranial nerves grossly intact [No Rash or Lesions] : no rash or lesions [FreeTextEntry1] : unhealthy weight

## 2022-06-15 NOTE — ED ADULT NURSE NOTE - NSIMPLEMENTINTERV_GEN_ALL_ED
Implemented All Fall with Harm Risk Interventions:  [place] to call system. Call bell, personal items and telephone within reach. Instruct patient to call for assistance. Room bathroom lighting operational. Non-slip footwear when patient is off stretcher. Physically safe environment: no spills, clutter or unnecessary equipment. Stretcher in lowest position, wheels locked, appropriate side rails in place. Provide visual cue, wrist band, yellow gown, etc. Monitor gait and stability. Monitor for mental status changes and reorient to person, place, and time. Review medications for side effects contributing to fall risk. Reinforce activity limits and safety measures with patient and family. Provide visual clues: red socks.

## 2022-06-15 NOTE — H&P ADULT - PROBLEM SELECTOR PLAN 3
"Fell" to sit down due to weakness in his legs  Weakness due to COVID  no dizziness  no head trauma  no pain after fall  no work up needed

## 2022-06-15 NOTE — H&P ADULT - NSHPLABSRESULTS_GEN_ALL_CORE
14.0   6.60  )-----------( 176      ( 15 Kei 2022 15:12 )             42.7     06-15    138  |  108  |  15  ----------------------------<  134<H>  4.2   |  26  |  0.82    Ca    8.2<L>      15 Kei 2022 15:12  Mg     2.2     06-15    TPro  6.2  /  Alb  3.1<L>  /  TBili  0.5  /  DBili  x   /  AST  14  /  ALT  16  /  AlkPhos  81  06-15        LIVER FUNCTIONS - ( 15 Kei 2022 15:12 )  Alb: 3.1 g/dL / Pro: 6.2 g/dL / ALK PHOS: 81 U/L / ALT: 16 U/L DA / AST: 14 U/L / GGT: x                 Lactate, Blood: 1.7 mmol/L (06-15 @ 15:10)          EKG: NSR with prwp in lead V1-v4    RADIOLOGY STUDIES:

## 2022-06-15 NOTE — ED ADULT TRIAGE NOTE - MEANS OF ARRIVAL
Infusion Nursing Note    Jewels Vidal Presents to [facility] today for: Actemra    Due to : KIRAN (juvenile idiopathic arthritis), oligoarthritis, extended (H)    Intravenous Access/Labs: PIV placed in left hand using j-tip without issues. Labs drawn as ordered.    Coping: Child Family Life declined    Infusion Note: Pt arrived to clinic with mom. Mom denies any recent fever/infections-see checklist below. Actemra infused over 1 hour without complications. After infusion completed, PIV was removed. VSS.    Discharge Plan: Mother verbalized understanding of discharge instructions. Pt left [facility] in stable condition.         Checklist for Pediatric Rheumatology Patients in [facility]    PRIOR TO INFUSION OF ANY OF THESE MEDICATIONS LISTED OR OTHER BIOLOGICAL MEDICATIONS (INCLUDING BIOSIMILARS):      Actemra (tocilizumab)    Benlysta (belimumab)    Orencia (abatacept)    Remicade (infliximab)    Rituxan (rituximab)    Cytoxan (cyclosphosphamide)    1. Current infection needing anti-viral, anti-bacterial (antibiotic), or anti-fungal therapy  NO    2. Temperature over 100.5 on arrival or within the last 24 hours  NO    3. Fever (undocumented), chills, or other symptoms such as:  a. Ear pain, sinus pain, or congestion  b. Throat pain or enlarged or tender lymph nodes  c. Cough or other lower respiratory symptoms  d. Nausea, vomiting, diarrhea, or unexpected weight loss  e. Urinary symptoms (pain, urgency, frequency)  f. Skin or nail infections  NO    4. Recent live vaccines (such as MMR, varicella, intranasal polio, Yellow Fever)  NO    5. Recent unexpected hospitalizations or surgeries (for example, ruptured appendicitis)  NO    6. New or worsened depression or other mental health concerns  NO    7. Confirmed pregnancy or possible pregnancy (but not yet tested)  NO   stretcher

## 2022-06-15 NOTE — H&P ADULT - NSHPSOCIALHISTORY_GEN_ALL_CORE
Cardiology: Aurea Connor Dora cardiologist in [place]  [phone]  HCP: Jaylene Mac [phone]

## 2022-06-15 NOTE — H&P ADULT - PROBLEM SELECTOR PLAN 2
Patient had weakness   found with ANTONIA-COV-2 +ve   he had similar episode in the past  however this episode has no hypoxia  CXR shows sequale from previous pro  Elevated hemidiaphragm on the right  -repeat CXR in the AM sitting upright  -Monitor for hypoxia  -Hydration gently  -no Remdesivir decadrone for now  -supportive care  -PT consult

## 2022-06-16 LAB
A1C WITH ESTIMATED AVERAGE GLUCOSE RESULT: 7 % — HIGH (ref 4–5.6)
ALBUMIN SERPL ELPH-MCNC: 2.6 G/DL — LOW (ref 3.5–5)
ALBUMIN SERPL ELPH-MCNC: 2.9 G/DL — LOW (ref 3.5–5)
ALP SERPL-CCNC: 71 U/L — SIGNIFICANT CHANGE UP (ref 40–120)
ALP SERPL-CCNC: 76 U/L — SIGNIFICANT CHANGE UP (ref 40–120)
ALT FLD-CCNC: 12 U/L DA — SIGNIFICANT CHANGE UP (ref 10–60)
ALT FLD-CCNC: 14 U/L DA — SIGNIFICANT CHANGE UP (ref 10–60)
ANION GAP SERPL CALC-SCNC: 5 MMOL/L — SIGNIFICANT CHANGE UP (ref 5–17)
AST SERPL-CCNC: 11 U/L — SIGNIFICANT CHANGE UP (ref 10–40)
AST SERPL-CCNC: 8 U/L — LOW (ref 10–40)
BASOPHILS # BLD AUTO: 0.02 K/UL — SIGNIFICANT CHANGE UP (ref 0–0.2)
BASOPHILS NFR BLD AUTO: 0.4 % — SIGNIFICANT CHANGE UP (ref 0–2)
BILIRUB DIRECT SERPL-MCNC: 0.1 MG/DL — SIGNIFICANT CHANGE UP (ref 0–0.3)
BILIRUB INDIRECT FLD-MCNC: 0.2 MG/DL — SIGNIFICANT CHANGE UP (ref 0.2–1)
BILIRUB SERPL-MCNC: 0.3 MG/DL — SIGNIFICANT CHANGE UP (ref 0.2–1.2)
BILIRUB SERPL-MCNC: 0.5 MG/DL — SIGNIFICANT CHANGE UP (ref 0.2–1.2)
BUN SERPL-MCNC: 12 MG/DL — SIGNIFICANT CHANGE UP (ref 7–18)
CALCIUM SERPL-MCNC: 8.4 MG/DL — SIGNIFICANT CHANGE UP (ref 8.4–10.5)
CHLORIDE SERPL-SCNC: 108 MMOL/L — SIGNIFICANT CHANGE UP (ref 96–108)
CO2 SERPL-SCNC: 27 MMOL/L — SIGNIFICANT CHANGE UP (ref 22–31)
CREAT SERPL-MCNC: 0.78 MG/DL — SIGNIFICANT CHANGE UP (ref 0.5–1.3)
CREAT SERPL-MCNC: 0.78 MG/DL — SIGNIFICANT CHANGE UP (ref 0.5–1.3)
CULTURE RESULTS: SIGNIFICANT CHANGE UP
EGFR: 87 ML/MIN/1.73M2 — SIGNIFICANT CHANGE UP
EGFR: 87 ML/MIN/1.73M2 — SIGNIFICANT CHANGE UP
EOSINOPHIL # BLD AUTO: 0.01 K/UL — SIGNIFICANT CHANGE UP (ref 0–0.5)
EOSINOPHIL NFR BLD AUTO: 0.2 % — SIGNIFICANT CHANGE UP (ref 0–6)
ESTIMATED AVERAGE GLUCOSE: 154 MG/DL — HIGH (ref 68–114)
FOLATE SERPL-MCNC: 12.6 NG/ML — SIGNIFICANT CHANGE UP
GLUCOSE BLDC GLUCOMTR-MCNC: 123 MG/DL — HIGH (ref 70–99)
GLUCOSE BLDC GLUCOMTR-MCNC: 133 MG/DL — HIGH (ref 70–99)
GLUCOSE BLDC GLUCOMTR-MCNC: 136 MG/DL — HIGH (ref 70–99)
GLUCOSE BLDC GLUCOMTR-MCNC: 145 MG/DL — HIGH (ref 70–99)
GLUCOSE BLDC GLUCOMTR-MCNC: 164 MG/DL — HIGH (ref 70–99)
GLUCOSE SERPL-MCNC: 123 MG/DL — HIGH (ref 70–99)
HCT VFR BLD CALC: 40.2 % — SIGNIFICANT CHANGE UP (ref 39–50)
HGB BLD-MCNC: 13.2 G/DL — SIGNIFICANT CHANGE UP (ref 13–17)
IMM GRANULOCYTES NFR BLD AUTO: 0.6 % — SIGNIFICANT CHANGE UP (ref 0–1.5)
INR BLD: 1.01 RATIO — SIGNIFICANT CHANGE UP (ref 0.88–1.16)
LYMPHOCYTES # BLD AUTO: 0.81 K/UL — LOW (ref 1–3.3)
LYMPHOCYTES # BLD AUTO: 16.5 % — SIGNIFICANT CHANGE UP (ref 13–44)
MAGNESIUM SERPL-MCNC: 2 MG/DL — SIGNIFICANT CHANGE UP (ref 1.6–2.6)
MCHC RBC-ENTMCNC: 31 PG — SIGNIFICANT CHANGE UP (ref 27–34)
MCHC RBC-ENTMCNC: 32.8 GM/DL — SIGNIFICANT CHANGE UP (ref 32–36)
MCV RBC AUTO: 94.4 FL — SIGNIFICANT CHANGE UP (ref 80–100)
MONOCYTES # BLD AUTO: 0.67 K/UL — SIGNIFICANT CHANGE UP (ref 0–0.9)
MONOCYTES NFR BLD AUTO: 13.6 % — SIGNIFICANT CHANGE UP (ref 2–14)
NEUTROPHILS # BLD AUTO: 3.38 K/UL — SIGNIFICANT CHANGE UP (ref 1.8–7.4)
NEUTROPHILS NFR BLD AUTO: 68.7 % — SIGNIFICANT CHANGE UP (ref 43–77)
NRBC # BLD: 0 /100 WBCS — SIGNIFICANT CHANGE UP (ref 0–0)
PHOSPHATE SERPL-MCNC: 2.1 MG/DL — LOW (ref 2.5–4.5)
PLATELET # BLD AUTO: 152 K/UL — SIGNIFICANT CHANGE UP (ref 150–400)
POTASSIUM SERPL-MCNC: 4.2 MMOL/L — SIGNIFICANT CHANGE UP (ref 3.5–5.3)
POTASSIUM SERPL-SCNC: 4.2 MMOL/L — SIGNIFICANT CHANGE UP (ref 3.5–5.3)
PROT SERPL-MCNC: 5.6 G/DL — LOW (ref 6–8.3)
PROT SERPL-MCNC: 5.9 G/DL — LOW (ref 6–8.3)
PROTHROM AB SERPL-ACNC: 12 SEC — SIGNIFICANT CHANGE UP (ref 10.5–13.4)
RBC # BLD: 4.26 M/UL — SIGNIFICANT CHANGE UP (ref 4.2–5.8)
RBC # FLD: 12.7 % — SIGNIFICANT CHANGE UP (ref 10.3–14.5)
SODIUM SERPL-SCNC: 140 MMOL/L — SIGNIFICANT CHANGE UP (ref 135–145)
SPECIMEN SOURCE: SIGNIFICANT CHANGE UP
TROPONIN I, HIGH SENSITIVITY RESULT: 103 NG/L — HIGH
VIT B12 SERPL-MCNC: 1892 PG/ML — HIGH (ref 232–1245)
WBC # BLD: 4.92 K/UL — SIGNIFICANT CHANGE UP (ref 3.8–10.5)
WBC # FLD AUTO: 4.92 K/UL — SIGNIFICANT CHANGE UP (ref 3.8–10.5)

## 2022-06-16 PROCEDURE — 71045 X-RAY EXAM CHEST 1 VIEW: CPT | Mod: 26

## 2022-06-16 RX ORDER — REMDESIVIR 5 MG/ML
INJECTION INTRAVENOUS
Refills: 0 | Status: COMPLETED | OUTPATIENT
Start: 2022-06-16 | End: 2022-06-19

## 2022-06-16 RX ORDER — REMDESIVIR 5 MG/ML
200 INJECTION INTRAVENOUS EVERY 24 HOURS
Refills: 0 | Status: COMPLETED | OUTPATIENT
Start: 2022-06-16 | End: 2022-06-17

## 2022-06-16 RX ADMIN — SIMVASTATIN 20 MILLIGRAM(S): 20 TABLET, FILM COATED ORAL at 21:58

## 2022-06-16 RX ADMIN — PANTOPRAZOLE SODIUM 40 MILLIGRAM(S): 20 TABLET, DELAYED RELEASE ORAL at 10:04

## 2022-06-16 RX ADMIN — SODIUM CHLORIDE 125 MILLILITER(S): 9 INJECTION INTRAMUSCULAR; INTRAVENOUS; SUBCUTANEOUS at 05:30

## 2022-06-16 RX ADMIN — Medication 81 MILLIGRAM(S): at 11:28

## 2022-06-16 RX ADMIN — SIMVASTATIN 20 MILLIGRAM(S): 20 TABLET, FILM COATED ORAL at 00:27

## 2022-06-16 RX ADMIN — LOSARTAN POTASSIUM 100 MILLIGRAM(S): 100 TABLET, FILM COATED ORAL at 05:31

## 2022-06-16 RX ADMIN — ENOXAPARIN SODIUM 40 MILLIGRAM(S): 100 INJECTION SUBCUTANEOUS at 05:31

## 2022-06-16 RX ADMIN — Medication 25 MILLIGRAM(S): at 05:30

## 2022-06-16 RX ADMIN — AMLODIPINE BESYLATE 5 MILLIGRAM(S): 2.5 TABLET ORAL at 05:31

## 2022-06-16 RX ADMIN — Medication 20 MILLIGRAM(S): at 05:31

## 2022-06-16 RX ADMIN — Medication 3 MILLIGRAM(S): at 03:27

## 2022-06-16 NOTE — CONSULT NOTE ADULT - SUBJECTIVE AND OBJECTIVE BOX
C A R D I O L O G Y  *********************    DATE OF SERVICE: 06-16-22    HISTORY OF PRESENT ILLNESS: HPI:  87 y.o. male with h/o NIDDM, HTN, obesity, normal LV and R function on echo performed in our office 1 year ago, normal perfusion on a treadmill nuclear stress 12/21, non-vax,  p/w  2 days,  weakness, low PO.  Also presents with a dry cough 2 days prompting him to go down the stairs (due to broken elevator) to get COVID tested,  at which point he fell on his buttock.   States that yesterday  his legs felt weak, and he decided to "sit down" on the steps. Denies any injury, LOC. NO travel.  Pt's girlfriend had fever on Sun, visited over the weekend. Patient felt warm this morning, and took tylenol.  Patient has had similar symptoms when he was admitted 3/15/2021 for COVID with superimposed PNA, that required oxygen, and had "fluid in the lungs" requiring diuretic, Pt was eventually dc on home O2. Now he reports that since then he has chronic shortness of breath after walking a couple blocks, but denies chest pain, sob at rest, changes in bowel bladder function.   Denies Angina    in the ED: /84 HR 84 sats 93% on RA  EKG: NSR, with PRWP V1-V4  Exam: 2+ pitting edema otherwise unremarkable  Labs: unremarkable other than troponin 106, BNP wnl,  Evelyn-CoV 2 +ve    Admitted for trops and supportive care of COVID     (15 Kei 2022 17:40)      PAST MEDICAL & SURGICAL HISTORY:  Diabetes mellitus      HTN (hypertension)      Hypercholesterolemia      S/P hernia repair      History of prostate surgery              MEDICATIONS:  MEDICATIONS  (STANDING):  amLODIPine   Tablet 5 milliGRAM(s) Oral daily  aspirin  chewable 81 milliGRAM(s) Oral daily  enoxaparin Injectable 40 milliGRAM(s) SubCutaneous every 24 hours  furosemide    Tablet 20 milliGRAM(s) Oral daily  insulin lispro (ADMELOG) corrective regimen sliding scale   SubCutaneous three times a day before meals  insulin lispro (ADMELOG) corrective regimen sliding scale   SubCutaneous at bedtime  losartan 100 milliGRAM(s) Oral daily  melatonin 3 milliGRAM(s) Oral at bedtime  metoprolol succinate ER 25 milliGRAM(s) Oral daily  pantoprazole    Tablet 40 milliGRAM(s) Oral before breakfast  simvastatin 20 milliGRAM(s) Oral at bedtime  sodium chloride 0.9%. 1000 milliLiter(s) (125 mL/Hr) IV Continuous <Continuous>      Allergies    No Known Allergies    Intolerances        FAMILY HISTORY:    Non-contributary for premature coronary disease or sudden cardiac death    SOCIAL HISTORY:    [X ] Non-smoker  [ ] Smoker  [ ] Alcohol        REVIEW OF SYSTEMS:  [ ]chest pain  [  ]shortness of breath  [  ]palpitations  [  ]syncope  [ ]near syncope [ ]upper extremity weakness   [ ] lower extremity weakness  [  ]diplopia  [  ]altered mental status   [  ]fevers  [ ]chills [ ]nausea  [ ]vomitting  [  ]dysphagia    [ ]abdominal pain  [ ]melena  [ ]BRBPR    [  ]epistaxis  [  ]rash    [ ]lower extremity edema        [X] All others negative	  [ ] Unable to obtain      LABS:	 	    CARDIAC MARKERS:  CARDIAC MARKERS ( 15 Kei 2022 21:09 )  x     / x     / 65 U/L / x     / x            Troponin I, High Sensitivity Result: 103.0 ng/L (06-16-22 @ 01:33)  Troponin I, High Sensitivity Result: 106.0 ng/L (06-15-22 @ 15:12)                            13.2   4.92  )-----------( 152      ( 16 Jun 2022 06:14 )             40.2     Hb Trend: 13.2<--, 14.0<--    06-16    140  |  108  |  12  ----------------------------<  123<H>  4.2   |  27  |  0.78    Ca    8.4      16 Jun 2022 06:14  Phos  2.1     06-16  Mg     2.0     06-16    TPro  5.9<L>  /  Alb  2.9<L>  /  TBili  0.5  /  DBili  x   /  AST  8<L>  /  ALT  12  /  AlkPhos  76  06-16    Creatinine Trend: 0.78<--, 0.82<--    Coags:      proBNP: Serum Pro-Brain Natriuretic Peptide: 374 pg/mL (06-15 @ 15:12)    Lipid Profile:   HgA1c:   TSH: Thyroid Stimulating Hormone, Serum: 1.24 uU/mL (06-15 @ 21:09)          PHYSICAL EXAM:  T(C): 37.8 (06-16-22 @ 08:40), Max: 37.8 (06-16-22 @ 08:40)  HR: 61 (06-16-22 @ 08:40) (61 - 86)  BP: 145/74 (06-16-22 @ 08:40) (144/67 - 172/84)  RR: 18 (06-16-22 @ 08:40) (17 - 19)  SpO2: 94% (06-16-22 @ 08:40) (93% - 95%)  Wt(kg): --   BMI (kg/m2): 23.4 (06-15-22 @ 13:37)  I&O's Summary      HEENT:  (-)icterus (-)pallor  CV: N S1 S2 1/6 REBEKAH (+)2 Pulses B/l  Resp:  Clear to ausculatation B/L, normal effort  GI: (+) BS Soft, NT, ND  Lymph:  (-)Edema, (-)obvious lymphadenopathy  Skin: Warm to touch, Normal turgor  Psych: Appropriate mood and affect      ECG:  	Sinus 74 BPM, LAFB    RADIOLOGY:         CXR:   < from: Xray Chest 1 View-PORTABLE IMMEDIATE (06.15.22 @ 17:01) >  1. Discoid atelectasis in the left lower lobe is not present on the prior   exam.  2. Elevated right hemidiaphragm  3. No evidence of pneumonia, pleural effusion or pulmonary edema, however   there are areas of nodularity along the right heart border, likely in the   medial segment of the right middle lobe, which could representsequela   from previous Covid and is suggested on the prior exam as well.    < end of copied text >      ASSESSMENT/PLAN: 	87y Male  NIDDM, HTN, obesity, normal LV and R function on echo performed in our office 1 year ago, normal perfusion on a treadmill nuclear stress 12/21, non-vax,  p/w  2 days,  weakness, low PO covid (+) trace (+) trop of questionable clinical significance,     - clinical presentation is inconsistent with ACS.  Positive trop ? due to falling on Buttock  - Please check DDimmer  - treatment of COVID per medical team  - will follow with you    I once again thank you for allowing me to participate in the care of your patient.  If you have any questions or concerns please do not hesitate to contact me.    Boogie Jack MD, [facility]  BEEPER [phone]

## 2022-06-16 NOTE — PROGRESS NOTE ADULT - SUBJECTIVE AND OBJECTIVE BOX
NP Note discussed with Primary Attending.    Patient is a 87y old  Male who presents with a chief complaint of Troponemia (2022 11:18)      INTERVAL HPI/OVERNIGHT EVENTS: no new complaints    MEDICATIONS  (STANDING):  amLODIPine   Tablet 5 milliGRAM(s) Oral daily  aspirin  chewable 81 milliGRAM(s) Oral daily  enoxaparin Injectable 40 milliGRAM(s) SubCutaneous every 24 hours  furosemide    Tablet 20 milliGRAM(s) Oral daily  insulin lispro (ADMELOG) corrective regimen sliding scale   SubCutaneous three times a day before meals  insulin lispro (ADMELOG) corrective regimen sliding scale   SubCutaneous at bedtime  losartan 100 milliGRAM(s) Oral daily  melatonin 3 milliGRAM(s) Oral at bedtime  metoprolol succinate ER 25 milliGRAM(s) Oral daily  pantoprazole    Tablet 40 milliGRAM(s) Oral before breakfast  simvastatin 20 milliGRAM(s) Oral at bedtime  sodium chloride 0.9%. 1000 milliLiter(s) (125 mL/Hr) IV Continuous <Continuous>    MEDICATIONS  (PRN):      __________________________________________________  REVIEW OF SYSTEMS:    CONSTITUTIONAL: No fever,   EYES: no acute visual disturbances  NECK: No pain or stiffness  RESPIRATORY: No cough; No shortness of breath  CARDIOVASCULAR: No chest pain, no palpitations  GASTROINTESTINAL: No pain. No nausea or vomiting; No diarrhea   NEUROLOGICAL: No headache or numbness, no tremors  MUSCULOSKELETAL: No joint pain, no muscle pain  GENITOURINARY: no dysuria, no frequency, no hesitancy  PSYCHIATRY: no depression , no anxiety  ALL OTHER  ROS negative        Vital Signs Last 24 Hrs  T(C): 37.8 (2022 08:40), Max: 37.8 (2022 08:40)  T(F): 100 (2022 08:40), Max: 100 (2022 08:40)  HR: 61 (2022 08:40) (61 - 80)  BP: 145/74 (2022 08:40) (144/67 - 155/73)  BP(mean): --  RR: 18 (2022 08:40) (18 - 19)  SpO2: 94% (2022 08:40) (94% - 95%)    ________________________________________________  PHYSICAL EXAM:  GENERAL: NAD  HEENT: Normocephalic;  conjunctivae and sclerae clear; moist mucous membranes;   NECK : supple  CHEST/LUNG: Clear to auscultation bilaterally with good air entry   HEART: S1 S2  regular; no murmurs, gallops or rubs  ABDOMEN: Soft, Nontender, Nondistended; Bowel sounds present  EXTREMITIES: no cyanosis; no edema; no calf tenderness  SKIN: warm and dry; no rash  NERVOUS SYSTEM:  Awake and alert; Oriented  to place, person and time ; no new deficits    _________________________________________________  LABS:                        13.2   4.92  )-----------( 152      ( 2022 06:14 )             40.2     06-16    140  |  108  |  12  ----------------------------<  123<H>  4.2   |  27  |  0.78    Ca    8.4      2022 06:14  Phos  2.1     06-16  Mg     2.0     06-16    TPro  5.9<L>  /  Alb  2.9<L>  /  TBili  0.5  /  DBili  x   /  AST  8<L>  /  ALT  12  /  AlkPhos  76  06-16      Urinalysis Basic - ( 15 Kei 2022 21:09 )    Color: Yellow / Appearance: Clear / S.005 / pH: x  Gluc: x / Ketone: Negative  / Bili: Negative / Urobili: Negative   Blood: x / Protein: 15 / Nitrite: Negative   Leuk Esterase: Negative / RBC: 0-2 /HPF / WBC 0-2 /HPF   Sq Epi: x / Non Sq Epi: x / Bacteria: Few /HPF      CAPILLARY BLOOD GLUCOSE      POCT Blood Glucose.: 145 mg/dL (2022 11:20)  POCT Blood Glucose.: 136 mg/dL (2022 08:06)  POCT Blood Glucose.: 123 mg/dL (2022 03:30)        RADIOLOGY & ADDITIONAL TESTS:  ACC: [id number] EXAM:  XR CHEST PORTABLE IMMED 1V                          PROCEDURE DATE:  06/15/2022          INTERPRETATION:  INDICATION: Shortness of breath    COMPARISON: 3/27/2021    FINDINGS:  A semiupright portable chest x-ray shows the patient rotated toward the   right along with  a somewhat shallow inspiratory effort. Discoid   atelectasis is suspected in the left lower lobe, not present previously.   There is an elevated right hemidiaphragm. No definite evidence of   infiltrate is seen, however linear are areas of nodularity along the   right heart border, likely in the right middle lobe, and could represent   sequela from previous Covid. This is suggested previously as well. There   is no pneumothorax. There are no pleural effusions. There is a tortuous   descending thoracic aorta. The heart does not appear enlarged. There is   no pulmonary edema. The bony thorax remains unchanged.    IMPRESSION:  1. Discoid atelectasis in the left lower lobe is not present on the prior   exam.  2. Elevated right hemidiaphragm  3. No evidence of pneumonia, pleural effusion or pulmonary edema, however   there are areas of nodularity along the right heart border, likely in the   medial segment of the right middle lobe, which could representsequela   from previous Covid and is suggested on the prior exam as well.    --- End of Report ---      Imaging  Reviewed:  YES/NO    Consultant(s) Notes Reviewed:   YES/ No      Plan of care was discussed with patient and /or primary care giver; all questions and concerns were addressed

## 2022-06-16 NOTE — PROGRESS NOTE ADULT - PROBLEM SELECTOR PLAN 1
P/w weakness in the legs  complicated by fall, found to have Evelyn CoV 2 +ve   No chest pain  no hx of MI  trop 106 BNP WNL  -tele  -trend again   -echo.  - Cardio dr. Jack consulted

## 2022-06-16 NOTE — PROGRESS NOTE ADULT - NS ATTEND AMEND GEN_ALL_CORE FT
Patient seen and examined  Says he feels better, denies any SOB. Saturating well on room air currently.  Does state he feels weak, has 4 flight of stairs in his apartment building and the elevator not working. Was unable to go up the stairs.   Also lives alone at home    A/P  #COVID infection  #Generalized weakness  #Tropinemia  #Fall  #Diabetes, with hyperglycemia  #HTN  #HLD  - will start remdesvir for 3 days given patient is high risk with his age and comorbidities  - PT eval pending   - monitor O2 sat  - continue home meds  - lovenox BID

## 2022-06-16 NOTE — PROGRESS NOTE ADULT - PROBLEM SELECTOR PLAN 2
Patient had weakness   found with ANTONIA-COV-2 +ve   CXR shows sequale from previous pro  Elevated hemidiaphragm on the right  -repeat CXR in the AM   -Monitor for hypoxia  -Hydration gently  -no indication for Remdesivir decadrone for now  -supportive care  -PT consult.

## 2022-06-17 ENCOUNTER — TRANSCRIPTION ENCOUNTER (OUTPATIENT)
Age: 87
End: 2022-06-17

## 2022-06-17 LAB
ALBUMIN SERPL ELPH-MCNC: 3 G/DL — LOW (ref 3.5–5)
ALP SERPL-CCNC: 86 U/L — SIGNIFICANT CHANGE UP (ref 40–120)
ALT FLD-CCNC: 18 U/L DA — SIGNIFICANT CHANGE UP (ref 10–60)
ANION GAP SERPL CALC-SCNC: 7 MMOL/L — SIGNIFICANT CHANGE UP (ref 5–17)
AST SERPL-CCNC: 14 U/L — SIGNIFICANT CHANGE UP (ref 10–40)
BILIRUB SERPL-MCNC: 0.5 MG/DL — SIGNIFICANT CHANGE UP (ref 0.2–1.2)
BUN SERPL-MCNC: 10 MG/DL — SIGNIFICANT CHANGE UP (ref 7–18)
CALCIUM SERPL-MCNC: 8.9 MG/DL — SIGNIFICANT CHANGE UP (ref 8.4–10.5)
CHLORIDE SERPL-SCNC: 107 MMOL/L — SIGNIFICANT CHANGE UP (ref 96–108)
CO2 SERPL-SCNC: 28 MMOL/L — SIGNIFICANT CHANGE UP (ref 22–31)
CREAT SERPL-MCNC: 0.78 MG/DL — SIGNIFICANT CHANGE UP (ref 0.5–1.3)
EGFR: 87 ML/MIN/1.73M2 — SIGNIFICANT CHANGE UP
GLUCOSE BLDC GLUCOMTR-MCNC: 125 MG/DL — HIGH (ref 70–99)
GLUCOSE BLDC GLUCOMTR-MCNC: 132 MG/DL — HIGH (ref 70–99)
GLUCOSE BLDC GLUCOMTR-MCNC: 151 MG/DL — HIGH (ref 70–99)
GLUCOSE BLDC GLUCOMTR-MCNC: 155 MG/DL — HIGH (ref 70–99)
GLUCOSE SERPL-MCNC: 154 MG/DL — HIGH (ref 70–99)
HCT VFR BLD CALC: 44.5 % — SIGNIFICANT CHANGE UP (ref 39–50)
HGB BLD-MCNC: 15 G/DL — SIGNIFICANT CHANGE UP (ref 13–17)
INR BLD: 1 RATIO — SIGNIFICANT CHANGE UP (ref 0.88–1.16)
MCHC RBC-ENTMCNC: 31.1 PG — SIGNIFICANT CHANGE UP (ref 27–34)
MCHC RBC-ENTMCNC: 33.7 GM/DL — SIGNIFICANT CHANGE UP (ref 32–36)
MCV RBC AUTO: 92.1 FL — SIGNIFICANT CHANGE UP (ref 80–100)
NRBC # BLD: 0 /100 WBCS — SIGNIFICANT CHANGE UP (ref 0–0)
PLATELET # BLD AUTO: 176 K/UL — SIGNIFICANT CHANGE UP (ref 150–400)
POTASSIUM SERPL-MCNC: 3.8 MMOL/L — SIGNIFICANT CHANGE UP (ref 3.5–5.3)
POTASSIUM SERPL-SCNC: 3.8 MMOL/L — SIGNIFICANT CHANGE UP (ref 3.5–5.3)
PROT SERPL-MCNC: 7 G/DL — SIGNIFICANT CHANGE UP (ref 6–8.3)
PROTHROM AB SERPL-ACNC: 11.9 SEC — SIGNIFICANT CHANGE UP (ref 10.5–13.4)
RBC # BLD: 4.83 M/UL — SIGNIFICANT CHANGE UP (ref 4.2–5.8)
RBC # FLD: 12.8 % — SIGNIFICANT CHANGE UP (ref 10.3–14.5)
SODIUM SERPL-SCNC: 142 MMOL/L — SIGNIFICANT CHANGE UP (ref 135–145)
WBC # BLD: 3.73 K/UL — LOW (ref 3.8–10.5)
WBC # FLD AUTO: 3.73 K/UL — LOW (ref 3.8–10.5)

## 2022-06-17 PROCEDURE — 99233 SBSQ HOSP IP/OBS HIGH 50: CPT | Mod: GC

## 2022-06-17 RX ORDER — METFORMIN HYDROCHLORIDE 850 MG/1
500 TABLET ORAL
Refills: 0 | Status: DISCONTINUED | OUTPATIENT
Start: 2022-06-17 | End: 2022-06-21

## 2022-06-17 RX ORDER — REMDESIVIR 5 MG/ML
100 INJECTION INTRAVENOUS EVERY 24 HOURS
Refills: 0 | Status: COMPLETED | OUTPATIENT
Start: 2022-06-18 | End: 2022-06-19

## 2022-06-17 RX ADMIN — Medication 81 MILLIGRAM(S): at 11:50

## 2022-06-17 RX ADMIN — Medication 3 MILLIGRAM(S): at 21:59

## 2022-06-17 RX ADMIN — Medication 25 MILLIGRAM(S): at 05:54

## 2022-06-17 RX ADMIN — LOSARTAN POTASSIUM 100 MILLIGRAM(S): 100 TABLET, FILM COATED ORAL at 05:52

## 2022-06-17 RX ADMIN — PANTOPRAZOLE SODIUM 40 MILLIGRAM(S): 20 TABLET, DELAYED RELEASE ORAL at 06:02

## 2022-06-17 RX ADMIN — Medication 1: at 11:50

## 2022-06-17 RX ADMIN — Medication 20 MILLIGRAM(S): at 05:52

## 2022-06-17 RX ADMIN — ENOXAPARIN SODIUM 40 MILLIGRAM(S): 100 INJECTION SUBCUTANEOUS at 05:52

## 2022-06-17 RX ADMIN — METFORMIN HYDROCHLORIDE 500 MILLIGRAM(S): 850 TABLET ORAL at 17:16

## 2022-06-17 RX ADMIN — Medication 1: at 08:24

## 2022-06-17 RX ADMIN — SIMVASTATIN 20 MILLIGRAM(S): 20 TABLET, FILM COATED ORAL at 21:59

## 2022-06-17 RX ADMIN — SODIUM CHLORIDE 125 MILLILITER(S): 9 INJECTION INTRAMUSCULAR; INTRAVENOUS; SUBCUTANEOUS at 01:52

## 2022-06-17 RX ADMIN — REMDESIVIR 500 MILLIGRAM(S): 5 INJECTION INTRAVENOUS at 02:39

## 2022-06-17 RX ADMIN — Medication 600 MILLIGRAM(S): at 11:50

## 2022-06-17 RX ADMIN — AMLODIPINE BESYLATE 5 MILLIGRAM(S): 2.5 TABLET ORAL at 05:53

## 2022-06-17 RX ADMIN — Medication 600 MILLIGRAM(S): at 17:16

## 2022-06-17 NOTE — DISCHARGE NOTE PROVIDER - NSDCMRMEDTOKEN_GEN_ALL_CORE_FT
amLODIPine 5 mg oral tablet: 1 tab(s) orally once a day  aspirin 81 mg oral tablet, chewable: 1 tab(s) orally once a day  chromium picolinate 200 mcg oral tablet: 1 tab(s) orally once a day  Nicolasa-C 500 mg oral tablet: 1 tab(s) orally once a day  furosemide 20 mg oral tablet: 1 tab(s) orally once a day  Jardiance 10 mg oral tablet: 1 tab(s) orally once  Melatonin 3 mg oral tablet: 1 tab(s) orally once a day (at bedtime)  metFORMIN 500 mg oral tablet: 1 tab(s) orally once a day  metoprolol succinate 25 mg oral capsule, extended release: 1 cap(s) orally once a day  olmesartan 40 mg oral tablet: 1 tab(s) orally once a day  pantoprazole 40 mg oral delayed release tablet: 1 tab(s) orally once a day  Vitamin D3 25 mcg (1000 intl units) oral tablet: 1 tab(s) orally once a day  Zocor 20 mg oral tablet: 1 tab(s) orally once a day (at bedtime)   amLODIPine 5 mg oral tablet: 1 tab(s) orally once a day  aspirin 81 mg oral tablet, chewable: 1 tab(s) orally once a day  chromium picolinate 200 mcg oral tablet: 1 tab(s) orally once a day  Nicolasa-C 500 mg oral tablet: 1 tab(s) orally once a day  furosemide 20 mg oral tablet: 1 tab(s) orally once a day  guaiFENesin 600 mg oral tablet, extended release: 1 tab(s) orally every 12 hours  Jardiance 10 mg oral tablet: 1 tab(s) orally once  Melatonin 3 mg oral tablet: 1 tab(s) orally once a day (at bedtime)  metFORMIN 500 mg oral tablet: 1 tab(s) orally once a day  metoprolol succinate 25 mg oral capsule, extended release: 1 cap(s) orally once a day  olmesartan 40 mg oral tablet: 1 tab(s) orally once a day  pantoprazole 40 mg oral delayed release tablet: 1 tab(s) orally once a day  Vitamin D3 25 mcg (1000 intl units) oral tablet: 1 tab(s) orally once a day  Zocor 20 mg oral tablet: 1 tab(s) orally once a day (at bedtime)

## 2022-06-17 NOTE — PATIENT PROFILE ADULT - VISION (WITH CORRECTIVE LENSES IF THE PATIENT USUALLY WEARS THEM):
First attempt to contact patient with results, left voicemail requesting patient call urgent care back.  - Urine culture was positive for infection.  Per Karen NP, pt was treated in clinic and no further antibiotics are needed.  Bacteria resulted as \"Neissurua gonorrheae.\"  -Gonorrhea results were positive.    -Chlamydia was negative.  -Trichomonas was negative.    Pt was treated at urgent care for STI with azithromycin and rocephin.  Per provider note, pt should follow up with PCP for worsening symptoms.  
Second attempt to contact patient with results, mailbox is full and RN unable to leave voicemail.  Letter sent.    - Urine culture was positive for infection.  Per Karen NP, pt was treated in clinic and no further antibiotics are needed.  Bacteria resulted as \"Neissurua gonorrheae.\"  -Gonorrhea results were positive.    -Chlamydia was negative.  -Trichomonas was negative.     Pt was treated at urgent care for STI with azithromycin and rocephin.  Per provider note, pt should follow up with PCP.  
Normal vision: sees adequately in most situations; can see medication labels, newsprint

## 2022-06-17 NOTE — PROGRESS NOTE ADULT - SUBJECTIVE AND OBJECTIVE BOX
PGY-1 Progress Note discussed with attending    PAGER #: [phone]] TILL 5:00 PM  PLEASE CONTACT ON CALL TEAM:  - On Call Team (Please refer to Toni) FROM 5:00 PM - 8:30PM  - Nightfloat Team FROM 8:30 -7:30 AM    CHIEF COMPLAINT & BRIEF HOSPITAL COURSE:    INTERVAL HPI/OVERNIGHT EVENTS:   Patient seen and examined at bedside. No acute events overnight.    REVIEW OF SYSTEMS:  CONSTITUTIONAL: No fever, weight loss, or fatigue  RESPIRATORY: No cough, wheezing, chills or hemoptysis; No shortness of breath  CARDIOVASCULAR: No chest pain, palpitations, dizziness, or leg swelling  GASTROINTESTINAL: No abdominal pain. No nausea, vomiting, or hematemesis; No diarrhea or constipation. No melena or hematochezia.  GENITOURINARY: No dysuria or hematuria, urinary frequency  NEUROLOGICAL: No headaches, memory loss, loss of strength, numbness, or tremors  SKIN: No itching, burning, rashes, or lesions     amLODIPine   Tablet 5 milliGRAM(s) Oral daily  aspirin  chewable 81 milliGRAM(s) Oral daily  enoxaparin Injectable 40 milliGRAM(s) SubCutaneous every 24 hours  furosemide    Tablet 20 milliGRAM(s) Oral daily  insulin lispro (ADMELOG) corrective regimen sliding scale   SubCutaneous three times a day before meals  insulin lispro (ADMELOG) corrective regimen sliding scale   SubCutaneous at bedtime  losartan 100 milliGRAM(s) Oral daily  melatonin 3 milliGRAM(s) Oral at bedtime  metoprolol succinate ER 25 milliGRAM(s) Oral daily  pantoprazole    Tablet 40 milliGRAM(s) Oral before breakfast  remdesivir  IVPB   IV Intermittent   simvastatin 20 milliGRAM(s) Oral at bedtime      Vital Signs Last 24 Hrs  T(C): 36.8 (17 Jun 2022 05:28), Max: 37.8 (16 Jun 2022 08:40)  T(F): 98.3 (17 Jun 2022 05:28), Max: 100 (16 Jun 2022 08:40)  HR: 63 (17 Jun 2022 05:28) (60 - 72)  BP: 162/84 (17 Jun 2022 05:28) (132/73 - 162/84)  BP(mean): --  RR: 18 (17 Jun 2022 05:28) (18 - 18)  SpO2: 92% (17 Jun 2022 05:28) (92% - 95%)    PHYSICAL EXAMINATION:  GENERAL: NAD  HEAD:  Atraumatic, Normocephalic  EYES:  conjunctiva and sclera clear  NECK: Supple, No JVD, Normal thyroid  CHEST/LUNG: Clear to auscultation; No rales, rhonchi, wheezing, or rubs  HEART: Regular rate and rhythm; No murmurs, rubs, or gallops  ABDOMEN: Soft, Nontender, Nondistended; Bowel sounds present  NERVOUS SYSTEM:  Alert & Oriented X3,   EXTREMITIES:  2+ Peripheral Pulses, No clubbing, cyanosis, or edema  SKIN: warm dry                          15.0   3.73  )-----------( 176      ( 17 Jun 2022 08:01 )             44.5     06-16    x   |  x   |  x   ----------------------------<  x   x    |  x   |  0.78    Ca    8.4      16 Jun 2022 06:14  Phos  2.1     06-16  Mg     2.0     06-16    TPro  5.6<L>  /  Alb  2.6<L>  /  TBili  0.3  /  DBili  0.1  /  AST  11  /  ALT  14  /  AlkPhos  71  06-16    LIVER FUNCTIONS - ( 16 Jun 2022 18:56 )  Alb: 2.6 g/dL / Pro: 5.6 g/dL / ALK PHOS: 71 U/L / ALT: 14 U/L DA / AST: 11 U/L / GGT: x           CARDIAC MARKERS ( 15 Kei 2022 21:09 )  x     / x     / 65 U/L / x     / x          PT/INR - ( 17 Jun 2022 08:01 )   PT: 11.9 sec;   INR: 1.00 ratio             CAPILLARY BLOOD GLUCOSE      RADIOLOGY & ADDITIONAL TESTS:           PGY-1 Progress Note discussed with attending    PAGER #: [[phone]] TILL 5:00 PM  PLEASE CONTACT ON CALL TEAM:  - On Call Team (Please refer to Toni) FROM 5:00 PM - 8:30PM  - Nightfloat Team FROM 8:30 -7:30 AM    CHIEF COMPLAINT & BRIEF HOSPITAL COURSE:    INTERVAL HPI/OVERNIGHT EVENTS:   Patient seen and examined at bedside. No acute events overnight. Patient denies any complaints    REVIEW OF SYSTEMS:  CONSTITUTIONAL: No fever, weight loss, or fatigue  RESPIRATORY: No cough, wheezing, chills or hemoptysis; No shortness of breath  CARDIOVASCULAR: No chest pain, palpitations, dizziness, or leg swelling  GASTROINTESTINAL: No abdominal pain. No nausea, vomiting, or hematemesis; No diarrhea or constipation. No melena or hematochezia.  GENITOURINARY: No dysuria or hematuria, urinary frequency  NEUROLOGICAL: No headaches, memory loss, loss of strength, numbness, or tremors  SKIN: No itching, burning, rashes, or lesions     amLODIPine   Tablet 5 milliGRAM(s) Oral daily  aspirin  chewable 81 milliGRAM(s) Oral daily  enoxaparin Injectable 40 milliGRAM(s) SubCutaneous every 24 hours  furosemide    Tablet 20 milliGRAM(s) Oral daily  insulin lispro (ADMELOG) corrective regimen sliding scale   SubCutaneous three times a day before meals  insulin lispro (ADMELOG) corrective regimen sliding scale   SubCutaneous at bedtime  losartan 100 milliGRAM(s) Oral daily  melatonin 3 milliGRAM(s) Oral at bedtime  metoprolol succinate ER 25 milliGRAM(s) Oral daily  pantoprazole    Tablet 40 milliGRAM(s) Oral before breakfast  remdesivir  IVPB   IV Intermittent   simvastatin 20 milliGRAM(s) Oral at bedtime      Vital Signs Last 24 Hrs  T(C): 36.8 (17 Jun 2022 05:28), Max: 37.8 (16 Jun 2022 08:40)  T(F): 98.3 (17 Jun 2022 05:28), Max: 100 (16 Jun 2022 08:40)  HR: 63 (17 Jun 2022 05:28) (60 - 72)  BP: 162/84 (17 Jun 2022 05:28) (132/73 - 162/84)  BP(mean): --  RR: 18 (17 Jun 2022 05:28) (18 - 18)  SpO2: 92% (17 Jun 2022 05:28) (92% - 95%)    PHYSICAL EXAMINATION:  GENERAL: NAD  HEAD:  Atraumatic, Normocephalic  EYES:  conjunctiva and sclera clear  NECK: Supple, No JVD, Normal thyroid  CHEST/LUNG: Decreased BS on L; No rales, rhonchi, wheezing, or rubs  HEART: Regular rate and rhythm; No murmurs, rubs, or gallops  ABDOMEN: Soft, Nontender, Nondistended; Bowel sounds present  NERVOUS SYSTEM:  Alert & Oriented X3,   EXTREMITIES:  2+ Peripheral Pulses, No clubbing, cyanosis, or edema  SKIN: warm dry                          15.0   3.73  )-----------( 176      ( 17 Jun 2022 08:01 )             44.5     06-16    x   |  x   |  x   ----------------------------<  x   x    |  x   |  0.78    Ca    8.4      16 Jun 2022 06:14  Phos  2.1     06-16  Mg     2.0     06-16    TPro  5.6<L>  /  Alb  2.6<L>  /  TBili  0.3  /  DBili  0.1  /  AST  11  /  ALT  14  /  AlkPhos  71  06-16    LIVER FUNCTIONS - ( 16 Jun 2022 18:56 )  Alb: 2.6 g/dL / Pro: 5.6 g/dL / ALK PHOS: 71 U/L / ALT: 14 U/L DA / AST: 11 U/L / GGT: x           CARDIAC MARKERS ( 15 Kei 2022 21:09 )  x     / x     / 65 U/L / x     / x          PT/INR - ( 17 Jun 2022 08:01 )   PT: 11.9 sec;   INR: 1.00 ratio             CAPILLARY BLOOD GLUCOSE      RADIOLOGY & ADDITIONAL TESTS:

## 2022-06-17 NOTE — PATIENT PROFILE ADULT - FALL HARM RISK - HARM RISK INTERVENTIONS

## 2022-06-17 NOTE — DISCHARGE NOTE PROVIDER - NSDCPNSUBOBJ_GEN_ALL_CORE
Patient seen earlier this morning and translated by girlfriend Jaylene over phone.   Patient seen and examined 6/21/22 around 11AM    87 y.o. male with PMH of NIDDM, HTN, obesity, admitted with  2 days of generalized weakness and low PO intake  Also presents with a dry cough 2 days complicated by fall, with girl friend as sick contact, similar episode in the past 3/15/2021 for COVID with  now found again with COVID,  Evelyn-CoV 2 +ve. mildly elevated troponin 106, BNP wnl, Admitted for elevated trops and supportive care of COVID    Patient doing well; cough improved; minimal now; No other complaints. Saturating well on room air currently.  Patient lives alone has 4 flight of stairs in his apartment building and the elevator not working. Was unable to go up the stairs. patient has 5 hrs HHA 7 days a week which needs reinstated and was informed by  on Friday still awaiting HHA availability    Patient denies fever, chills, SOB, palpitations, chest pain, nausea, vomiting, diarrhea, constipation, dizziness    Vital Signs Last 24 Hrs  T(C): 36.6 (21 Jun 2022 11:32), Max: 36.6 (21 Jun 2022 11:32)  T(F): 97.8 (21 Jun 2022 11:32), Max: 97.8 (21 Jun 2022 11:32)  BP: 148/82 (21 Jun 2022 11:32) (148/82 - 148/82)  BP(mean): 104 (21 Jun 2022 11:32) (104 - 104)  RR: 18 (21 Jun 2022 11:32) (18 - 18)  SpO2: 96% (21 Jun 2022 11:32) (96% - 96%)    P/E:   Psych: AAO x3  Neuro: No gross focal deficits; Power and sensation intact  CVS: S1S2 present, regular, no edema  Resp: BLAE+, No wheeze or Rhonchi  GI: Soft, BS+, Non tender, non distended  Extr: No  calf tenderness B/L Lower extremities  Skin: Warm and moist without any rashes    Labs:                        14.0   4.40  )-----------( 204      ( 21 Jun 2022 06:16 )             41.7   06-21    142  |  108  |  15  ----------------------------<  120<H>  3.8   |  27  |  0.64    Ca    8.8      21 Jun 2022 06:16  Phos  3.1     06-21  Mg     2.0     06-21    TPro  5.9<L>  /  Alb  2.8<L>  /  TBili  0.7  /  DBili  x   /  AST  13  /  ALT  17  /  AlkPhos  76  06-21  D-Dimer Assay, Quantitative (06.15.22 @ 21:09) D-Dimer Assay, Quantitative: 418:    Xray Chest 1 View- PORTABLE-Routine (Xray Chest 1 View- PORTABLE-Routine in AM.) (06.16.22 @ 15:15) >  IMPRESSION: Basilar clearing    D/D:  COVID infection with very mild symptoms  Generalized weakness due to underlying COVID infection resolving  Elevated troponin likely Demand ischemia s/p Fall and nonspecific  s/p Mechanical Fall  Type 2 DM controlled well  HTN controlled  Hx Hyperlipidemia    Plan:  Patient completed Remdesevir; no hypoxia; no need for steroids  PT eval independent no skilled needs  monitor O2 sat: saturating well on RA  Continue home meds; Metoprolol, Amlodipine and Losartan  Continue Metformin; ISS; Resume Jardiance and Januvia on discharge (currently not available inhouse pharmacy); A1c 7.0  Sugars are low normal at this time as patient in a controlled setting with diet despite not on Jardiance and Januvia here but can resume on discharge  Cardio eval noted; No inpatient work up indicated; off telemetry   Labs stable   D-dimer appropriate for age    Discussed with patient findings and plan of care; D/C Plan Home with HHA resumed;  d/w ; aide at home 12 PM so arrange transport for 11 AM.   Discussed with friend Jaylene over the phone and updated with clinical status and stable for discharge; given all lab and imaging reports to patient as requested by friend  Discussed with  PGY1 Dr. Howard and RN.

## 2022-06-17 NOTE — PROGRESS NOTE ADULT - NS ATTEND AMEND GEN_ALL_CORE FT
Patient seen and examined this afternoon; [place]  [employer] ID # used  Vladimr    87 y.o. male with PMH of NIDDM, HTN, obesity,   p/w  2 days of generalized weakness and low PO intake  Also presents with a dry cough 2 days complicated by fall, with girl friend as sick contact, similar episode in the past 3/15/2021 for COVID with  now found again with COVID,  Evelyn-CoV 2 +ve. mildly elevated troponin 106, BNP wnl, Admitted for elevated trops and supportive care of COVID    Patient doing well; has significant cough but improved with Mucinex given earlier this morning.  Saturating well on room air currently.  Patient lives alone and still feeling weak,  has 4 flight of stairs in his apartment building and the elevator not working. Was unable to go up the stairs.   No other complaints.     Vital Signs Last 24 Hrs  T(C): 36.9 (17 Jun 2022 15:56), Max: 37 (17 Jun 2022 11:17)  T(F): 98.5 (17 Jun 2022 15:56), Max: 98.6 (17 Jun 2022 11:17)  HR: 58 (17 Jun 2022 15:56) (57 - 73)  BP: 150/67 (17 Jun 2022 15:56) (120/79 - 162/84)  RR: 19 (17 Jun 2022 15:56) (18 - 19)  SpO2: 94% (17 Jun 2022 15:56) (92% - 95%)    P/E:  elderly male, appears in good shape for stated age in NAD  Psych: AAO x3  Neuro: No gross focal deficits; Power and sensation intact  CVS: S1S2 present, regular, no edema  Resp: BLAE+, No wheeze or Rhonchi  GI: Soft, BS+, Non tender, non distended  Extr: No  calf tenderness B/L Lower extremities  Skin: Warm and moist without any rashes    Labs:                        15.0   3.73  )-----------( 176      ( 17 Jun 2022 08:01 )             44.5   06-17    142  |  107  |  10  ----------------------------<  154<H>  3.8   |  28  |  0.78  Ca    8.9      17 Jun 2022 08:01  Phos  2.1     06-16  Mg     2.0     06-16  TPro  7.0  /  Alb  3.0<L>  /  TBili  0.5  /  DBili  x   /  AST  14  /  ALT  18  /  AlkPhos  86  06-17    A1C with Estimated Average Glucose (06.16.22 @ 10:31) A1C with Estimated Average Glucose Result: 7.0:  D-Dimer Assay, Quantitative (06.15.22 @ 21:09) D-Dimer Assay, Quantitative: 418:     Xray Chest 1 View- PORTABLE-Routine (Xray Chest 1 View- PORTABLE-Routine in AM.) (06.16.22 @ 15:15) >  Frontal expiratory view of the chest shows the heart to be similar in size. The lungs show partial clearing of the lung bases with small right   effusion and there is no evidence of pneumothorax nor left pleural effusion.    IMPRESSION: Basilar clearing    A/P  COVID infection with very mild symptoms  Generalized weakness due to underlying COVID infection  Elevated troponin likely Demand ischemia s/p Fall and nonspecific  s/p Mechanical Fall  Type 2 DM controlled well  HTN controlled  Hx Hyperlipidemia    Plan:  Continue Remdesvir for while Inhouse given patient is high risk with his age and comorbidities  PT eval independent no skilled needs  monitor O2 sat: saturating well on RA  Continue home meds; resume Metformin; ISS; Resume Jardiance and Januvia on discharge (currently not available inhouse pharmacy)  Continue Lovenox Inhouse; D-dimer appropriate for age  Cardio eval noted; No inpatient work up indicated; D/C telemetry  Rest as per PGY1 note above    D/C Plan next 24 hrs if remain stable; Patient prefers to go home Sunday; given his advanced age and current COVID and living alone in apt with stairs, I will honor his wishes and D/C Sunday. Patient seen and examined this afternoon; Citizen of [place]  [employer] ID # used  Vladimr    87 y.o. male with PMH of NIDDM, HTN, obesity,   p/w  2 days of generalized weakness and low PO intake  Also presents with a dry cough 2 days complicated by fall, with girl friend as sick contact, similar episode in the past 3/15/2021 for COVID with  now found again with COVID,  Evelyn-CoV 2 +ve. mildly elevated troponin 106, BNP wnl, Admitted for elevated trops and supportive care of COVID    Patient doing well; has significant cough but improved with Mucinex given earlier this morning.  Saturating well on room air currently.  Patient lives alone and still feeling weak,  has 4 flight of stairs in his apartment building and the elevator not working. Was unable to go up the stairs.   No other complaints.     Vital Signs Last 24 Hrs  T(C): 36.9 (17 Jun 2022 15:56), Max: 37 (17 Jun 2022 11:17)  T(F): 98.5 (17 Jun 2022 15:56), Max: 98.6 (17 Jun 2022 11:17)  HR: 58 (17 Jun 2022 15:56) (57 - 73)  BP: 150/67 (17 Jun 2022 15:56) (120/79 - 162/84)  RR: 19 (17 Jun 2022 15:56) (18 - 19)  SpO2: 94% (17 Jun 2022 15:56) (92% - 95%)    P/E:  elderly male, appears in good shape for stated age in NAD  Psych: AAO x3  Neuro: No gross focal deficits; Power and sensation intact  CVS: S1S2 present, regular, no edema  Resp: BLAE+, No wheeze or Rhonchi  GI: Soft, BS+, Non tender, non distended  Extr: No  calf tenderness B/L Lower extremities  Skin: Warm and moist without any rashes    Labs:                        15.0   3.73  )-----------( 176      ( 17 Jun 2022 08:01 )             44.5   06-17    142  |  107  |  10  ----------------------------<  154<H>  3.8   |  28  |  0.78  Ca    8.9      17 Jun 2022 08:01  Phos  2.1     06-16  Mg     2.0     06-16  TPro  7.0  /  Alb  3.0<L>  /  TBili  0.5  /  DBili  x   /  AST  14  /  ALT  18  /  AlkPhos  86  06-17    A1C with Estimated Average Glucose (06.16.22 @ 10:31) A1C with Estimated Average Glucose Result: 7.0:  D-Dimer Assay, Quantitative (06.15.22 @ 21:09) D-Dimer Assay, Quantitative: 418:     Xray Chest 1 View- PORTABLE-Routine (Xray Chest 1 View- PORTABLE-Routine in AM.) (06.16.22 @ 15:15) >  Frontal expiratory view of the chest shows the heart to be similar in size. The lungs show partial clearing of the lung bases with small right   effusion and there is no evidence of pneumothorax nor left pleural effusion.    IMPRESSION: Basilar clearing    A/P  COVID infection with very mild symptoms  Generalized weakness due to underlying COVID infection  Elevated troponin likely Demand ischemia s/p Fall and nonspecific  s/p Mechanical Fall  Type 2 DM controlled well  HTN controlled  Hx Hyperlipidemia    Plan:  Continue Remdesvir for while Inhouse given patient is high risk with his age and comorbidities  PT eval independent no skilled needs  monitor O2 sat: saturating well on RA  Continue home meds; Metoprolol, Amlodipine and Losartan  resume Metformin; ISS; Resume Jardiance and Januvia on discharge (currently not available inhouse pharmacy)  Continue Lovenox Inhouse; D-dimer appropriate for age  Cardio eval noted; No inpatient work up indicated; D/C telemetry  Rest as per PGY1 note above; Labs stable no need to repeat daily unless clinical status changes    Discussed with patient findings and plan of care; D/C Plan next 24 hrs if remain stable; Patient prefers to go home Sunday; given his advanced age and current COVID and living alone in apt with stairs, I will honor his wishes and D/C Sunday. PGY1 updated friend Jaylene.  Discussed with PGY1 Dr. Howard and PGY3 Dr. Samano and RN

## 2022-06-17 NOTE — PROGRESS NOTE ADULT - PROBLEM SELECTOR PLAN 2
- P/w weakness in the legs, may be due to infections  - complicated by fall, found to have Evelyn CoV 2 +ve, will evaluate for other infections  - UCx negative  - BCx NGTD - P/w weakness in the legs, may be due to infections  - complicated by fall, found to have Evelyn CoV 2 +ve, will evaluate for other infections  - UCx negative  - BCx NGTD  - PT consult - P/w weakness in the legs, may be due to infections  - complicated by fall, found to have Evelyn CoV 2 +ve, will evaluate for other infections  - UCx negative  - BCx NGTD  - PT consult = no PT needed

## 2022-06-17 NOTE — PROGRESS NOTE ADULT - PROBLEM SELECTOR PLAN 4
c/w home meds  Norvasc, olmesartan, and metoprolol succinate. - c/w home meds  - Norvasc, olmesartan, and metoprolol succinate

## 2022-06-17 NOTE — DISCHARGE NOTE PROVIDER - PROVIDER TOKENS
FREE:[LAST:[Geeta],FIRST:[Jair],PHONE:[(   )    -],FAX:[(   )    -],FOLLOWUP:[2 weeks],ESTABLISHEDPATIENT:[T]]

## 2022-06-17 NOTE — DISCHARGE NOTE PROVIDER - NSDCCPCAREPLAN_GEN_ALL_CORE_FT
PRINCIPAL DISCHARGE DIAGNOSIS  Diagnosis: Elevated troponin  Assessment and Plan of Treatment: Patient's troponin was 106 and it trended down to 103. Patient had no chest pain. ProBNP was normal. EKG showed left anterior fascicular block, unchanged from 1 year ago. Patient was admitted to telemetry. Cardio dr. Jack was consulted and patient was cleared for discharge.        SECONDARY DISCHARGE DIAGNOSES  Diagnosis: Fall with no injury  Assessment and Plan of Treatment:   Patient complained of weakness in the legs and fell without any injuries. Weakness was likely due to COVID infection. Urine culture was negative. Blood cultures were negative. Physical therapy recommended ____.      Diagnosis: 2019 novel coronavirus disease (COVID-19)  Assessment and Plan of Treatment:   Patient was positive for COVID. Chest xray showed some pleural effusion and repeat x-ray showed partial clearing of the lung bases with small right effusion. There was no indication for Remdesivir, decadron as patient did not need any oxygen supplementation.      Diagnosis: HTN (hypertension)  Assessment and Plan of Treatment:   Patient had history of HTN and home meds were Norvasc, olmesartan, and metoprolol succinate. These were continued.      Diagnosis: HLD (hyperlipidemia)  Assessment and Plan of Treatment:   Patient had history of HLD and home Lipitor was continued.      Diagnosis: Diabetes  Assessment and Plan of Treatment:   Patient had history of diabetes taking januvia, jardiance and metformin at home. A1c was 7.0. Sliding scale was continued in the hospital.     PRINCIPAL DISCHARGE DIAGNOSIS  Diagnosis: Elevated troponin  Assessment and Plan of Treatment: You were admitted to telemetry unit. Your cardiac enzyme troponin was very slightly elevated at 106 and it trended down to 103. You had no chest pain. ProBNP was normal. EKG showed left anterior fascicular block, unchanged from 1 year ago. Cardiologist Dr. Jack was consulted and he cleared for discharge.  Please follow up with your cardiologist's office 2~3 weeks after discharge.      SECONDARY DISCHARGE DIAGNOSES  Diagnosis: Fall with no injury  Assessment and Plan of Treatment: You complained of weakness in the legs and fell without any injuries. Weakness was likely due to COVID infection. Urine culture was negative. Blood cultures were negative. Physical therapy recommended ____.  Please follow up with your primary care doctor 2 weeks after discharge.    Diagnosis: 2019 novel coronavirus disease (COVID-19)  Assessment and Plan of Treatment: You were positive for COVID. Chest xray showed some pleural effusion and repeat x-ray showed partial clearing of the lung bases with small right effusion. There was no indication for Remdesivir, decadron as you did not need any oxygen supplementation. Please follow up with your primary care doctor 2 weeks after discharge.      Diagnosis: HTN (hypertension)  Assessment and Plan of Treatment: You had history of HTN and home meds were Norvasc, olmesartan, lasix, and metoprolol succinate. These were continued. We did not make any changes to your home medication. Please continue taking as prescribed.    Diagnosis: HLD (hyperlipidemia)  Assessment and Plan of Treatment: You had history of HLD and home simvastatin was continued. We did not make any changes to your home medication. Please continue taking as prescribed.    Diagnosis: Diabetes  Assessment and Plan of Treatment: You had history of diabetes taking januvia, jardiance and metformin at home. HgbA1c was 7.0. Sliding scale was continued in the hospital. We did not make any changes to your home medication. Please continue taking your medications as prescribed.     PRINCIPAL DISCHARGE DIAGNOSIS  Diagnosis: Elevated troponin  Assessment and Plan of Treatment: You were admitted to telemetry unit. Your cardiac enzyme troponin was very slightly elevated at 106 and it trended down to 103. You had no chest pain. ProBNP was normal. EKG showed left anterior fascicular block, unchanged from 1 year ago. Cardiologist Dr. Jack was consulted and he cleared for discharge.  Please follow up with your cardiologist's office 2~3 weeks after discharge.      SECONDARY DISCHARGE DIAGNOSES  Diagnosis: Fall with no injury  Assessment and Plan of Treatment: You complained of weakness in the legs and fell without any injuries. Weakness was likely due to COVID infection. Urine culture was negative. Blood cultures were negative. Physical therapy said that you do not need physical therapy.  Please follow up with your primary care doctor 2 weeks after discharge.    Diagnosis: 2019 novel coronavirus disease (COVID-19)  Assessment and Plan of Treatment: You were positive for COVID. Chest xray showed some pleural effusion and repeat x-ray showed partial clearing of the lung bases with small right effusion. There was no indication for Remdesivir, decadron as you did not need any oxygen supplementation. Please follow up with your primary care doctor 2 weeks after discharge.      Diagnosis: HTN (hypertension)  Assessment and Plan of Treatment: You had history of HTN and home meds were Norvasc, olmesartan, lasix, and metoprolol succinate. These were continued. We did not make any changes to your home medication. Please continue taking as prescribed.    Diagnosis: HLD (hyperlipidemia)  Assessment and Plan of Treatment: You had history of HLD and home simvastatin was continued. We did not make any changes to your home medication. Please continue taking as prescribed.    Diagnosis: Diabetes  Assessment and Plan of Treatment: You had history of diabetes taking januvia, jardiance and metformin at home. HgbA1c was 7.0. Sliding scale was continued in the hospital. We did not make any changes to your home medication. Please continue taking your medications as prescribed.     PRINCIPAL DISCHARGE DIAGNOSIS  Diagnosis: Elevated troponin  Assessment and Plan of Treatment: You were admitted to telemetry unit. Your cardiac enzyme troponin was very slightly elevated at 106 and it trended down to 103. You had no chest pain. ProBNP was normal. EKG showed left anterior fascicular block, unchanged from 1 year ago. Cardiologist Dr. Jack was consulted and he cleared for discharge.  Please follow up with your cardiologist's office 2~3 weeks after discharge.      SECONDARY DISCHARGE DIAGNOSES  Diagnosis: Fall with no injury  Assessment and Plan of Treatment: You complained of weakness in the legs and fell without any injuries. Weakness was likely due to COVID infection. Urine culture was negative. Blood cultures were negative. Physical therapy said that you do not need physical therapy.  Please follow up with your primary care doctor 2 weeks after discharge.    Diagnosis: 2019 novel coronavirus disease (COVID-19)  Assessment and Plan of Treatment: You were positive for COVID. Chest xray showed some pleural effusion and repeat x-ray showed partial clearing of the lung bases with small right effusion. There was no indication for Remdesivir, decadron as you did not need any oxygen supplementation. // We are prescribing you Mucinex. Please pick it up from your [facility] and take MUCINEX twice a day. Please follow up with your primary care doctor 2 weeks after discharge.      Diagnosis: HTN (hypertension)  Assessment and Plan of Treatment: You had history of HTN and home meds were Norvasc, olmesartan, lasix, and metoprolol succinate. These were continued. We did not make any changes to your home medication. Please continue taking as prescribed.    Diagnosis: HLD (hyperlipidemia)  Assessment and Plan of Treatment: You had history of HLD and home simvastatin was continued. We did not make any changes to your home medication. Please continue taking as prescribed.    Diagnosis: Diabetes  Assessment and Plan of Treatment: You had history of diabetes taking januvia, jardiance and metformin at home. HgbA1c was 7.0. Sliding scale was continued in the hospital. We did not make any changes to your home medication. Please continue taking your medications as prescribed.     PRINCIPAL DISCHARGE DIAGNOSIS  Diagnosis: 2019 novel coronavirus disease (COVID-19)  Assessment and Plan of Treatment: You presented to hospital with generalized weakness. You were noted to be positive for Coronavirus COVID-19. You had prior hisotry of COVID infection last year. Chest xray showed some pleural effusion and repeat x-ray showed partial clearing of the lung bases of the prior infiltrate with small right effusion. You was not hypoxic (low oxygenation).  There was no indication for Decadron as you did not need any oxygen supplementation. You was given Remdesevir for 5 days. You did had significant cough which much improved with Mucinex.   // We are prescribing you Mucinex. Please pick it up from your [facility] and take MUCINEX twice a day only as needed. Please follow up with your primary care doctor within 2 weeks after discharge.        SECONDARY DISCHARGE DIAGNOSES  Diagnosis: Elevated troponin  Assessment and Plan of Treatment: You were admitted to telemetry unit. Your cardiac enzyme troponin was very slightly elevated at 106 and it trended down to 103.  You had no chest pain or shortness of breath.  ProBNP was normal. EKG showed left anterior fascicular block, unchanged from 1 year ago. Cardiologist Dr. Jack was consulted and likely elevated troponin form Demand ischemia   Please follow up with your cardiologist's office 2~3 weeks after discharge.    Diagnosis: Fall with no injury  Assessment and Plan of Treatment: You complained of weakness in the legs and fell without any injuries. Weakness was likely due to COVID infection. Urine culture was negative. Blood cultures were negative. Physical therapy said that you do not need physical therapy and you was independent at baseline.   Please follow up with your primary care doctor 2 weeks after discharge.    Diagnosis: HTN (hypertension)  Assessment and Plan of Treatment: You had history of HTN and home meds were Norvasc, olmesartan, lasix, and metoprolol succinate. These were continued. We did not make any changes to your home medication. Please continue taking as prescribed.    Diagnosis: HLD (hyperlipidemia)  Assessment and Plan of Treatment: You had history of HLD and home simvastatin was continued. We did not make any changes to your home medication. Please continue taking as prescribed.    Diagnosis: Diabetes  Assessment and Plan of Treatment: You had history of diabetes taking januvia, jardiance and metformin at home. HgbA1c was 7.0. Sliding scale was continued in the hospital. We did not make any changes to your home medication. Please continue taking your medications as prescribed. Exercise as feasible.    Diagnosis: 2019 novel coronavirus disease (COVID-19)  Assessment and Plan of Treatment: You were positive for COVID. Chest xray showed some pleural effusion and repeat x-ray showed partial clearing of the lung bases with small right effusion. There was no indication for Remdesivir, decadron as you did not need any oxygen supplementation. // We are prescribing you Mucinex. Please pick it up from your [facility] and take MUCINEX twice a day. Please follow up with your primary care doctor 2 weeks after discharge.

## 2022-06-17 NOTE — PROGRESS NOTE ADULT - SUBJECTIVE AND OBJECTIVE BOX
C A R D I O L O G Y  **********************************     DATE OF SERVICE: 06-17-22    Patient denies chest pain or shortness of breath.   Review of systems otherwise (-)  	  MEDICATIONS:  MEDICATIONS  (STANDING):  amLODIPine   Tablet 5 milliGRAM(s) Oral daily  aspirin  chewable 81 milliGRAM(s) Oral daily  enoxaparin Injectable 40 milliGRAM(s) SubCutaneous every 24 hours  guaiFENesin  milliGRAM(s) Oral every 12 hours  insulin lispro (ADMELOG) corrective regimen sliding scale   SubCutaneous at bedtime  insulin lispro (ADMELOG) corrective regimen sliding scale   SubCutaneous three times a day before meals  losartan 100 milliGRAM(s) Oral daily  melatonin 3 milliGRAM(s) Oral at bedtime  metFORMIN 500 milliGRAM(s) Oral two times a day  metoprolol succinate ER 25 milliGRAM(s) Oral daily  pantoprazole    Tablet 40 milliGRAM(s) Oral before breakfast  remdesivir  IVPB   IV Intermittent   simvastatin 20 milliGRAM(s) Oral at bedtime      LABS:	 	    CARDIAC MARKERS:  CARDIAC MARKERS ( 15 Kei 2022 21:09 )  x     / x     / 65 U/L / x     / x            Troponin I, High Sensitivity Result: 103.0 ng/L (06-16-22 @ 01:33)                              15.0   3.73  )-----------( 176      ( 17 Jun 2022 08:01 )             44.5     Hemoglobin: 15.0 g/dL (06-17 @ 08:01)  Hemoglobin: 13.2 g/dL (06-16 @ 06:14)  Hemoglobin: 14.0 g/dL (06-15 @ 15:12)      06-17    142  |  107  |  10  ----------------------------<  154<H>  3.8   |  28  |  0.78    Ca    8.9      17 Jun 2022 08:01  Phos  2.1     06-16  Mg     2.0     06-16    TPro  7.0  /  Alb  3.0<L>  /  TBili  0.5  /  DBili  x   /  AST  14  /  ALT  18  /  AlkPhos  86  06-17    Creatinine Trend: 0.78<--, 0.78<--, 0.78<--, 0.82<--    COAGS:   PT/INR - ( 17 Jun 2022 08:01 )   PT: 11.9 sec;   INR: 1.00 ratio             proBNP:   Lipid Profile:   HgA1c:   TSH:       PHYSICAL EXAM:  T(C): 36.9 (06-17-22 @ 15:56), Max: 37 (06-17-22 @ 11:17)  HR: 58 (06-17-22 @ 15:56) (57 - 73)  BP: 150/67 (06-17-22 @ 15:56) (120/79 - 162/84)  RR: 19 (06-17-22 @ 15:56) (18 - 19)  SpO2: 94% (06-17-22 @ 15:56) (92% - 95%)  Wt(kg): --  I&O's Summary        Gen: Appears well in NAD  HEENT:  (-)icterus (-)pallor  CV: N S1 S2 1/6 REBEKAH (+)2 Pulses B/l  Resp:  Clear to ausculatation B/L, normal effort  GI: (+) BS Soft, NT, ND  Lymph:  (-)Edema, (-)obvious lymphadenopathy  Skin: Warm to touch, Normal turgor  Psych: Appropriate mood and affect      TELEMETRY: 	  sinus        ASSESSMENT/PLAN: 	87y  Male  NIDDM, HTN, obesity, normal LV and R function on echo performed in our office 1 year ago, normal perfusion on a treadmill nuclear stress 12/21, non-vax,  p/w  2 days,  weakness, low PO covid (+) trace (+) trop of questionable clinical significance,     - clinical presentation is inconsistent with ACS.  Positive trop ? due to falling on Buttock  - DDimmer appropriate for AGE  - treatment of COVID per medical team  - No need for further inpatient cardiac work up.    Boogie Jack MD, [facility]  BEEPER [phone]

## 2022-06-17 NOTE — PROGRESS NOTE ADULT - PROBLEM SELECTOR PLAN 6
Takes januvia, jiardiance and metformin  A1C 7.0  ISS. - Takes januvia, jiardiance and metformin  - A1C 7.0  - ISS

## 2022-06-17 NOTE — PROGRESS NOTE ADULT - PROBLEM SELECTOR PLAN 1
P/w weakness in the legs  complicated by fall, found to have Evelyn CoV 2 +ve   No chest pain  no hx of MI  trop 106 BNP WNL  -tele  -trend again   -echo.  - Cardio dr. Jack consulted - P/w weakness in the legs  - Trop 106 -> 103  - complicated by fall, found to have Evelyn CoV 2 +ve   - No chest pain  - no hx of MI  - ProBNP wnl  - tele  - EKG - LAFB  - Cardio dr. Jack consulted = cleared for D/C

## 2022-06-17 NOTE — DISCHARGE NOTE PROVIDER - HOSPITAL COURSE
87 y.o. male with h/o NIDDM, HTN, obesity, CVI,  non-vax,  p/w  2 days,  weakness, low PO.  Also presents with a dry cough 2 days complicated by fall,  with girl friend as sick contact, similar episode in the past 3/15/2021 for COVID with now found with NAD, mild edema, vital stable with some hypertension, NSR, troponin 106, BNP wnl, COVID,  Evelyn-CoV 2 +ve. Admitted to telemetry for troponin and supportive care of COVID.    Patient's troponin was 106 and it trended down to 103. Patient had no chest pain. ProBNP was normal. EKG showed left anterior fascicular block, unchanged from 1 year ago. Patient was admitted to telemetry. Cardio dr. Jack was consulted and patient was cleared for discharge.    Patient complained of weakness in the legs and fell without any injuries. Weakness was likely due to COVID infection. Urine culture was negative. Blood cultures were negative. Physical therapy recommended ____.    Patient was positive for COVID. Chest xray showed some pleural effusion and repeat x-ray showed partial clearing of the lung bases with small right effusion. There was no indication for Remdesivir, decadron as patient did not need any oxygen supplementation.    Patient had history of HTN and home meds were Norvasc, olmesartan, and metoprolol succinate. These were continued.    Patient had history of HLD and home Lipitor was continued.    Patient had history of diabetes taking januvia, jardiance and metformin at home. A1c was 7.0. Sliding scale was continued in the hospital.    Patient is stable for discharge. Patient has been advised to follow up as outpatient. Case has been discussed with the attending. 87 y.o. male with h/o NIDDM, HTN, obesity, CVI,  non-vax,  p/w  2 days,  weakness, low PO.  Also presents with a dry cough 2 days complicated by fall,  with girl friend as sick contact, similar episode in the past 3/15/2021 for COVID with now found with NAD, mild edema, vital stable with some hypertension, NSR, troponin 106, BNP wnl, COVID,  Evelyn-CoV 2 +ve. Admitted to telemetry for troponin and supportive care of COVID.    Patient's troponin was 106 and it trended down to 103. Patient had no chest pain. ProBNP was normal. EKG showed left anterior fascicular block, unchanged from 1 year ago. Patient was admitted to telemetry. Cardio dr. Jack was consulted and patient was cleared for discharge.    Patient complained of weakness in the legs and fell without any injuries. Weakness was likely due to COVID infection. Urine culture was negative. Blood cultures were negative. Physical therapy did not recommend any physical therapy.    Patient was positive for COVID. Chest xray showed some pleural effusion and repeat x-ray showed partial clearing of the lung bases with small right effusion. There was no indication for Remdesivir, decadron as patient did not need any oxygen supplementation.    Patient had history of HTN and home meds were Norvasc, olmesartan, and metoprolol succinate. These were continued.    Patient had history of HLD and home Lipitor was continued.    Patient had history of diabetes taking januvia, jardiance and metformin at home. A1c was 7.0. Sliding scale was continued in the hospital.    Patient is stable for discharge. Patient has been advised to follow up as outpatient. Case has been discussed with the attending.

## 2022-06-17 NOTE — PHYSICAL THERAPY INITIAL EVALUATION ADULT - ADDITIONAL COMMENTS
no assistive device used; occasionally uses a single-tip cane due to occasional unsteady balance during gait and transfers

## 2022-06-17 NOTE — ED ADULT NURSE REASSESSMENT NOTE - NS ED NURSE REASSESS COMMENT FT1
1130 pm received pt from evening RN
Pt received from DARSHAN raphael. PT denies any chest pain. IV intact right hand 22G. Pending bed

## 2022-06-17 NOTE — PROGRESS NOTE ADULT - PROBLEM SELECTOR PLAN 3
Patient had weakness   found with ANTONIA-COV-2 +ve   CXR shows sequale from previous pro  Elevated hemidiaphragm on the right  -repeat CXR in the AM   -Monitor for hypoxia  -Hydration gently  -no indication for Remdesivir decadrone for now  -supportive care  -PT consult. - Patient had weakness   - found with ANTONIA-COV-2 +ve   - CXR shows sequale from previous pro  - Elevated hemidiaphragm on the right  - repeat CXR = partial clearing of the lung bases with small right effusion  - Monitor for hypoxia  - Hydration gently  - no indication for Remdesivir, decadron for now - Patient had weakness   - found with ANTONIA-COV-2 +ve   - CXR shows sequale from previous pro  - Elevated hemidiaphragm on the right  - repeat CXR = partial clearing of the lung bases with small right effusion  - Monitor for hypoxia  - D Dimer wnl at 418 (age adjusted D dimer upper limit is 430)  - no indication for Remdesivir, decadron for now

## 2022-06-18 LAB
GLUCOSE BLDC GLUCOMTR-MCNC: 106 MG/DL — HIGH (ref 70–99)
GLUCOSE BLDC GLUCOMTR-MCNC: 119 MG/DL — HIGH (ref 70–99)
GLUCOSE BLDC GLUCOMTR-MCNC: 125 MG/DL — HIGH (ref 70–99)
GLUCOSE BLDC GLUCOMTR-MCNC: 129 MG/DL — HIGH (ref 70–99)

## 2022-06-18 PROCEDURE — 99233 SBSQ HOSP IP/OBS HIGH 50: CPT

## 2022-06-18 RX ADMIN — METFORMIN HYDROCHLORIDE 500 MILLIGRAM(S): 850 TABLET ORAL at 17:24

## 2022-06-18 RX ADMIN — Medication 81 MILLIGRAM(S): at 12:06

## 2022-06-18 RX ADMIN — SIMVASTATIN 20 MILLIGRAM(S): 20 TABLET, FILM COATED ORAL at 22:42

## 2022-06-18 RX ADMIN — Medication 3 MILLIGRAM(S): at 22:41

## 2022-06-18 RX ADMIN — AMLODIPINE BESYLATE 5 MILLIGRAM(S): 2.5 TABLET ORAL at 05:20

## 2022-06-18 RX ADMIN — ENOXAPARIN SODIUM 40 MILLIGRAM(S): 100 INJECTION SUBCUTANEOUS at 05:19

## 2022-06-18 RX ADMIN — REMDESIVIR 500 MILLIGRAM(S): 5 INJECTION INTRAVENOUS at 02:27

## 2022-06-18 RX ADMIN — LOSARTAN POTASSIUM 100 MILLIGRAM(S): 100 TABLET, FILM COATED ORAL at 05:19

## 2022-06-18 RX ADMIN — Medication 600 MILLIGRAM(S): at 17:24

## 2022-06-18 RX ADMIN — Medication 25 MILLIGRAM(S): at 05:19

## 2022-06-18 RX ADMIN — PANTOPRAZOLE SODIUM 40 MILLIGRAM(S): 20 TABLET, DELAYED RELEASE ORAL at 05:17

## 2022-06-18 RX ADMIN — Medication 600 MILLIGRAM(S): at 06:36

## 2022-06-18 RX ADMIN — METFORMIN HYDROCHLORIDE 500 MILLIGRAM(S): 850 TABLET ORAL at 05:19

## 2022-06-18 NOTE — PROGRESS NOTE ADULT - SUBJECTIVE AND OBJECTIVE BOX
C A R D I O L O G Y  **********************************     DATE OF SERVICE: 06-18-22      pt seen and examined, no complaints, ROS - .          amLODIPine   Tablet 5 milliGRAM(s) Oral daily  aspirin  chewable 81 milliGRAM(s) Oral daily  enoxaparin Injectable 40 milliGRAM(s) SubCutaneous every 24 hours  guaiFENesin  milliGRAM(s) Oral every 12 hours  insulin lispro (ADMELOG) corrective regimen sliding scale   SubCutaneous three times a day before meals  insulin lispro (ADMELOG) corrective regimen sliding scale   SubCutaneous at bedtime  losartan 100 milliGRAM(s) Oral daily  melatonin 3 milliGRAM(s) Oral at bedtime  metFORMIN 500 milliGRAM(s) Oral two times a day  metoprolol succinate ER 25 milliGRAM(s) Oral daily  pantoprazole    Tablet 40 milliGRAM(s) Oral before breakfast  remdesivir  IVPB   IV Intermittent   remdesivir  IVPB 100 milliGRAM(s) IV Intermittent every 24 hours  simvastatin 20 milliGRAM(s) Oral at bedtime                            15.0   3.73  )-----------( 176      ( 17 Jun 2022 08:01 )             44.5       Hemoglobin: 15.0 g/dL (06-17 @ 08:01)  Hemoglobin: 13.2 g/dL (06-16 @ 06:14)  Hemoglobin: 14.0 g/dL (06-15 @ 15:12)      06-17    142  |  107  |  10  ----------------------------<  154<H>  3.8   |  28  |  0.78    Ca    8.9      17 Jun 2022 08:01    TPro  7.0  /  Alb  3.0<L>  /  TBili  0.5  /  DBili  x   /  AST  14  /  ALT  18  /  AlkPhos  86  06-17    Creatinine Trend: 0.78<--, 0.78<--, 0.78<--, 0.82<--    COAGS:     CARDIAC MARKERS ( 15 Kei 2022 21:09 )  x     / x     / 65 U/L / x     / x            T(C): 36.9 (06-17-22 @ 15:56), Max: 37 (06-17-22 @ 11:17)  HR: 58 (06-17-22 @ 15:56) (57 - 68)  BP: 150/67 (06-17-22 @ 15:56) (137/64 - 154/84)  RR: 19 (06-17-22 @ 15:56) (18 - 19)  SpO2: 94% (06-17-22 @ 15:56) (92% - 95%)  Wt(kg): --    I&O's Summary    17 Jun 2022 07:01  -  18 Jun 2022 07:00  --------------------------------------------------------  IN: 290 mL / OUT: 0 mL / NET: 290 mL        Gen: Appears well in NAD  HEENT:  (-)icterus (-)pallor  CV: N S1 S2 1/6 REBEKAH (+)2 Pulses B/l  Resp:  Clear to ausculatation B/L, normal effort  GI: (+) BS Soft, NT, ND  Lymph:  (-)Edema, (-)obvious lymphadenopathy  Skin: Warm to touch, Normal turgor  Psych: Appropriate mood and affect      TELEMETRY: 	  sinus        ASSESSMENT/PLAN: 	87y  Male  NIDDM, HTN, obesity, normal LV and R function on echo performed in our office 1 year ago, normal perfusion on a treadmill nuclear stress 12/21, non-vax,  p/w  2 days,  weakness, low PO covid (+) trace (+) trop of questionable clinical significance,     - clinical presentation is inconsistent with ACS.  Positive trop ? due to falling on Buttock  - DDimmer appropriate for AGE  - treatment of COVID per medical team  - No need for further inpatient cardiac work up.    Boogie Jack MD, [facility]  BEEPER [phone]         C A R D I O L O G Y  **********************************     DATE OF SERVICE: 06-18-22      pt seen and examined, no complaints, ROS - .          amLODIPine   Tablet 5 milliGRAM(s) Oral daily  aspirin  chewable 81 milliGRAM(s) Oral daily  enoxaparin Injectable 40 milliGRAM(s) SubCutaneous every 24 hours  guaiFENesin  milliGRAM(s) Oral every 12 hours  insulin lispro (ADMELOG) corrective regimen sliding scale   SubCutaneous three times a day before meals  insulin lispro (ADMELOG) corrective regimen sliding scale   SubCutaneous at bedtime  losartan 100 milliGRAM(s) Oral daily  melatonin 3 milliGRAM(s) Oral at bedtime  metFORMIN 500 milliGRAM(s) Oral two times a day  metoprolol succinate ER 25 milliGRAM(s) Oral daily  pantoprazole    Tablet 40 milliGRAM(s) Oral before breakfast  remdesivir  IVPB   IV Intermittent   remdesivir  IVPB 100 milliGRAM(s) IV Intermittent every 24 hours  simvastatin 20 milliGRAM(s) Oral at bedtime                            15.0   3.73  )-----------( 176      ( 17 Jun 2022 08:01 )             44.5       Hemoglobin: 15.0 g/dL (06-17 @ 08:01)  Hemoglobin: 13.2 g/dL (06-16 @ 06:14)  Hemoglobin: 14.0 g/dL (06-15 @ 15:12)      06-17    142  |  107  |  10  ----------------------------<  154<H>  3.8   |  28  |  0.78    Ca    8.9      17 Jun 2022 08:01    TPro  7.0  /  Alb  3.0<L>  /  TBili  0.5  /  DBili  x   /  AST  14  /  ALT  18  /  AlkPhos  86  06-17    Creatinine Trend: 0.78<--, 0.78<--, 0.78<--, 0.82<--    COAGS:     CARDIAC MARKERS ( 15 Kei 2022 21:09 )  x     / x     / 65 U/L / x     / x            T(C): 36.9 (06-17-22 @ 15:56), Max: 37 (06-17-22 @ 11:17)  HR: 58 (06-17-22 @ 15:56) (57 - 68)  BP: 150/67 (06-17-22 @ 15:56) (137/64 - 154/84)  RR: 19 (06-17-22 @ 15:56) (18 - 19)  SpO2: 94% (06-17-22 @ 15:56) (92% - 95%)  Wt(kg): --    I&O's Summary    17 Jun 2022 07:01  -  18 Jun 2022 07:00  --------------------------------------------------------  IN: 290 mL / OUT: 0 mL / NET: 290 mL        Gen: Appears well in NAD  HEENT:  (-)icterus (-)pallor  CV: N S1 S2 1/6 REBEKAH (+)2 Pulses B/l  Resp:  Clear to ausculatation B/L, normal effort  GI: (+) BS Soft, NT, ND  Lymph:  (-)Edema, (-)obvious lymphadenopathy  Skin: Warm to touch, Normal turgor  Psych: Appropriate mood and affect      TELEMETRY: 	  sinus        ASSESSMENT/PLAN: 	87y  Male  NIDDM, HTN, obesity, normal LV and R function on echo performed in our office 1 year ago, normal perfusion on a treadmill nuclear stress 12/21, non-vax,  p/w  2 days,  weakness, low PO covid (+) trace (+) trop of questionable clinical significance,     - clinical presentation is inconsistent with ACS.  Positive trop ? due to falling on Buttock  - DDimmer appropriate for AGE  - treatment of COVID per medical team  - No need for further inpatient cardiac work up.

## 2022-06-18 NOTE — PROGRESS NOTE ADULT - SUBJECTIVE AND OBJECTIVE BOX
87 y.o. male with PMH of NIDDM, HTN, obesity,   p/w  2 days of generalized weakness and low PO intake  Also presents with a dry cough 2 days complicated by fall, with girl friend as sick contact, similar episode in the past 3/15/2021 for COVID with  now found again with COVID,  Evelyn-CoV 2 +ve. mildly elevated troponin 106, BNP wnl, Admitted for elevated trops and supportive care of COVID    Patient doing well; has significant cough but improved with Mucinex given earlier this morning.  Saturating well on room air currently.  Patient lives alone and still feeling weak,  has 4 flight of stairs in his apartment building and the elevator not working. Was unable to go up the stairs.   No other complaints.     Vital Signs Last 24 Hrs  T(C): 36.4 (18 Jun 2022 15:25), Max: 36.7 (18 Jun 2022 11:52)  T(F): 97.5 (18 Jun 2022 15:25), Max: 98 (18 Jun 2022 11:52)  HR: 53 (18 Jun 2022 15:25) (53 - 62)  BP: 129/75 (18 Jun 2022 15:25) (126/75 - 164/85)  BP(mean): --  RR: 18 (18 Jun 2022 15:25) (18 - 18)  SpO2: 95% (18 Jun 2022 15:25) (92% - 95%)    P/E:  elderly male, appears in good shape for stated age in NAD  Psych: AAO x3  Neuro: No gross focal deficits; Power and sensation intact  CVS: S1S2 present, regular, no edema  Resp: BLAE+, No wheeze or Rhonchi  GI: Soft, BS+, Non tender, non distended  Extr: No  calf tenderness B/L Lower extremities  Skin: Warm and moist without any rashes    Labs:               Xray Chest 1 View- PORTABLE-Routine (Xray Chest 1 View- PORTABLE-Routine in AM.) (06.16.22 @ 15:15) >  Frontal expiratory view of the chest shows the heart to be similar in size. The lungs show partial clearing of the lung bases with small right   effusion and there is no evidence of pneumothorax nor left pleural effusion.    IMPRESSION: Basilar clearing    A/P  COVID infection with very mild symptoms  Generalized weakness due to underlying COVID infection  Elevated troponin likely Demand ischemia s/p Fall and nonspecific  s/p Mechanical Fall  Type 2 DM controlled well  HTN controlled  Hx Hyperlipidemia    Plan:  Continue Remdesvir for while Inhouse given patient is high risk with his age and comorbidities  PT eval independent no skilled needs  monitor O2 sat: saturating well on RA  Continue home meds; Metoprolol, Amlodipine and Losartan  resume Metformin; ISS; Resume Jardiance and Januvia on discharge (currently not available inhouse pharmacy)  Continue Lovenox Inhouse; D-dimer appropriate for age  Cardio eval noted; No inpatient work up indicated; D/C telemetry  Rest as per PGY1 note above; Labs stable no need to repeat daily unless clinical status changes    Discussed with patient findings and plan of care; D/C Plan next 24 hrs if remain stable; Patient prefers to go home Sunday; given his advanced age and current COVID and living alone in apt with stairs, I will honor his wishes and D/C Sunday. PGY1 updated friend Jaylene.  Discussed with PGY1 Dr. Howard and PGY3 Dr. Samano and RN. Patient seen and examined earlier this evening around 5 PM    87 y.o. male with PMH of NIDDM, HTN, obesity,   p/w  2 days of generalized weakness and low PO intake  Also presents with a dry cough 2 days complicated by fall, with girl friend as sick contact, similar episode in the past 3/15/2021 for COVID with  now found again with COVID,  Evelyn-CoV 2 +ve. mildly elevated troponin 106, BNP wnl, Admitted for elevated trops and supportive care of COVID    Patient doing well; cough significantly improved with Mucinex.  Saturating well on room air currently.  Patient lives alone and still feeling weak,  has 4 flight of stairs in his apartment building and the elevator not working. Was unable to go up the stairs. Informed by friend Jaylene that patient has 5 hrs HHA 7 days a week which needs reinstated and was informed by  on Friday that patient has to stay until Monday.   No other complaints.   denies fever, chills, SOB, palpitations, chest pain, nausea, vomiting, diarrhea, constipation, dizziness    MEDICATIONS  (STANDING):  amLODIPine   Tablet 5 milliGRAM(s) Oral daily  aspirin  chewable 81 milliGRAM(s) Oral daily  enoxaparin Injectable 40 milliGRAM(s) SubCutaneous every 24 hours  guaiFENesin  milliGRAM(s) Oral every 12 hours  insulin lispro (ADMELOG) corrective regimen sliding scale   SubCutaneous at bedtime  insulin lispro (ADMELOG) corrective regimen sliding scale   SubCutaneous three times a day before meals  losartan 100 milliGRAM(s) Oral daily  melatonin 3 milliGRAM(s) Oral at bedtime  metFORMIN 500 milliGRAM(s) Oral two times a day  metoprolol succinate ER 25 milliGRAM(s) Oral daily  pantoprazole    Tablet 40 milliGRAM(s) Oral before breakfast  remdesivir  IVPB   IV Intermittent   remdesivir  IVPB 100 milliGRAM(s) IV Intermittent every 24 hours  simvastatin 20 milliGRAM(s) Oral at bedtime    MEDICATIONS  (PRN):      Vital Signs Last 24 Hrs  T(C): 36.4 (18 Jun 2022 15:25), Max: 36.7 (18 Jun 2022 11:52)  T(F): 97.5 (18 Jun 2022 15:25), Max: 98 (18 Jun 2022 11:52)  HR: 53 (18 Jun 2022 15:25) (53 - 62)  BP: 129/75 (18 Jun 2022 15:25) (126/75 - 164/85)  RR: 18 (18 Jun 2022 15:25) (18 - 18)  SpO2: 95% (18 Jun 2022 15:25) (92% - 95%)    P/E:  elderly male, appears in good shape for stated age in NAD  Psych: AAO x3  Neuro: No gross focal deficits; Power and sensation intact  CVS: S1S2 present, regular, no edema  Resp: BLAE+, No wheeze or Rhonchi  GI: Soft, BS+, Non tender, non distended  Extr: No  calf tenderness B/L Lower extremities  Skin: Warm and moist without any rashes    Labs: stable 6/17/22; repeat Monday                        15.0   3.73  )-----------( 176      ( 17 Jun 2022 08:01 )             44.5   06-17    142  |  107  |  10  ----------------------------<  154<H>  3.8   |  28  |  0.78    Ca    8.9      17 Jun 2022 08:01    TPro  7.0  /  Alb  3.0<L>  /  TBili  0.5  /  DBili  x   /  AST  14  /  ALT  18  /  AlkPhos  86  06-17                 Xray Chest 1 View- PORTABLE-Routine (Xray Chest 1 View- PORTABLE-Routine in AM.) (06.16.22 @ 15:15) >  Frontal expiratory view of the chest shows the heart to be similar in size. The lungs show partial clearing of the lung bases with small right   effusion and there is no evidence of pneumothorax nor left pleural effusion.    IMPRESSION: Basilar clearing

## 2022-06-19 LAB
GLUCOSE BLDC GLUCOMTR-MCNC: 115 MG/DL — HIGH (ref 70–99)
GLUCOSE BLDC GLUCOMTR-MCNC: 124 MG/DL — HIGH (ref 70–99)
GLUCOSE BLDC GLUCOMTR-MCNC: 131 MG/DL — HIGH (ref 70–99)
GLUCOSE BLDC GLUCOMTR-MCNC: 133 MG/DL — HIGH (ref 70–99)

## 2022-06-19 PROCEDURE — 99232 SBSQ HOSP IP/OBS MODERATE 35: CPT | Mod: GC

## 2022-06-19 RX ORDER — METOPROLOL TARTRATE 50 MG
25 TABLET ORAL DAILY
Refills: 0 | Status: DISCONTINUED | OUTPATIENT
Start: 2022-06-19 | End: 2022-06-21

## 2022-06-19 RX ADMIN — Medication 25 MILLIGRAM(S): at 12:47

## 2022-06-19 RX ADMIN — PANTOPRAZOLE SODIUM 40 MILLIGRAM(S): 20 TABLET, DELAYED RELEASE ORAL at 06:04

## 2022-06-19 RX ADMIN — Medication 600 MILLIGRAM(S): at 17:39

## 2022-06-19 RX ADMIN — METFORMIN HYDROCHLORIDE 500 MILLIGRAM(S): 850 TABLET ORAL at 17:39

## 2022-06-19 RX ADMIN — Medication 81 MILLIGRAM(S): at 12:45

## 2022-06-19 RX ADMIN — LOSARTAN POTASSIUM 100 MILLIGRAM(S): 100 TABLET, FILM COATED ORAL at 06:04

## 2022-06-19 RX ADMIN — ENOXAPARIN SODIUM 40 MILLIGRAM(S): 100 INJECTION SUBCUTANEOUS at 06:04

## 2022-06-19 RX ADMIN — Medication 600 MILLIGRAM(S): at 06:04

## 2022-06-19 RX ADMIN — METFORMIN HYDROCHLORIDE 500 MILLIGRAM(S): 850 TABLET ORAL at 06:04

## 2022-06-19 RX ADMIN — SIMVASTATIN 20 MILLIGRAM(S): 20 TABLET, FILM COATED ORAL at 21:01

## 2022-06-19 RX ADMIN — REMDESIVIR 500 MILLIGRAM(S): 5 INJECTION INTRAVENOUS at 02:02

## 2022-06-19 RX ADMIN — AMLODIPINE BESYLATE 5 MILLIGRAM(S): 2.5 TABLET ORAL at 06:04

## 2022-06-19 RX ADMIN — Medication 3 MILLIGRAM(S): at 21:01

## 2022-06-19 NOTE — PROGRESS NOTE ADULT - PROBLEM SELECTOR PLAN 2
- P/w weakness in the legs, may be due to infections  - complicated by fall, found to have Evelyn CoV 2 +ve, will evaluate for other infections  - UCx negative  - BCx NGTD  - PT consult = no PT needed

## 2022-06-19 NOTE — PROGRESS NOTE ADULT - PROBLEM SELECTOR PLAN 1
- P/w weakness in the legs  - Trop 106 -> 103  - complicated by fall, found to have Evelyn CoV 2 +ve   - No chest pain  - no hx of MI  - ProBNP wnl  - tele  - EKG - LAFB  - Cardio dr. Jack consulted = cleared for D/C. Not consistent with ACS

## 2022-06-19 NOTE — PROGRESS NOTE ADULT - SUBJECTIVE AND OBJECTIVE BOX
C A R D I O L O G Y  **********************************     DATE OF SERVICE: 06-19-22      S: no chest pain or sob; ros otherwise negative.     amLODIPine   Tablet 5 milliGRAM(s) Oral daily  aspirin  chewable 81 milliGRAM(s) Oral daily  enoxaparin Injectable 40 milliGRAM(s) SubCutaneous every 24 hours  guaiFENesin  milliGRAM(s) Oral every 12 hours  insulin lispro (ADMELOG) corrective regimen sliding scale   SubCutaneous three times a day before meals  insulin lispro (ADMELOG) corrective regimen sliding scale   SubCutaneous at bedtime  losartan 100 milliGRAM(s) Oral daily  melatonin 3 milliGRAM(s) Oral at bedtime  metFORMIN 500 milliGRAM(s) Oral two times a day  metoprolol succinate ER 25 milliGRAM(s) Oral daily  pantoprazole    Tablet 40 milliGRAM(s) Oral before breakfast  simvastatin 20 milliGRAM(s) Oral at bedtime                        T(C): 36.4 (06-19-22 @ 11:38), Max: 36.7 (06-18-22 @ 23:28)  HR: 58 (06-19-22 @ 11:38) (53 - 61)  BP: 147/88 (06-19-22 @ 11:38) (129/75 - 157/78)  RR: 18 (06-19-22 @ 11:38) (18 - 18)  SpO2: 97% (06-19-22 @ 11:38) (93% - 97%)  Wt(kg): --    I&O's Summary      Gen: Appears well in NAD  HEENT:  (-)icterus (-)pallor  CV: N S1 S2 1/6 REBEKAH (+)2 Pulses B/l  Resp:  Clear to ausculatation B/L, normal effort  GI: (+) BS Soft, NT, ND  Lymph:  trace edema, (-)obvious lymphadenopathy  Skin: Warm to touch, Normal turgor  Psych: Appropriate mood and affect            ASSESSMENT/PLAN: 	87y  Male  NIDDM, HTN, obesity, normal LV and R function on echo performed in our office 1 year ago, normal perfusion on a treadmill nuclear stress 12/21, non-vax,  p/w  2 days,  weakness, low PO covid (+) trace (+) trop of questionable clinical significance,     - clinical presentation is inconsistent with ACS.  Positive trop ? due to falling on Buttock  - DDimmer appropriate for AGE  - treatment of COVID per medical team  - No need for further inpatient cardiac work up expected at this time     Edmond Gramajo MD

## 2022-06-19 NOTE — PROGRESS NOTE ADULT - ATTENDING COMMENTS
Patient seen earlier this afternoon    87 y.o. male with PMH of NIDDM, HTN, obesity, admitted with  2 days of generalized weakness and low PO intake  Also presents with a dry cough 2 days complicated by fall, with girl friend as sick contact, similar episode in the past 3/15/2021 for COVID with  now found again with COVID,  Evelyn-CoV 2 +ve. mildly elevated troponin 106, BNP wnl, Admitted for elevated trops and supportive care of COVID    Patient doing well; cough improved with Mucinex but still worried. No other complaints. Saturating well on room air currently.  Patient lives alone has 4 flight of stairs in his apartment building and the elevator not working. Was unable to go up the stairs. Informed by friend Jaylene yesterday that patient has 5 hrs HHA 7 days a week which needs reinstated and was informed by  on Friday that patient has to stay until Monday.     denies fever, chills, SOB, palpitations, chest pain, nausea, vomiting, diarrhea, constipation, dizziness      P/E: as above; clinically unchanged    Labs: stable 6/17/22; repeat Monday            Xray Chest 1 View- PORTABLE-Routine (Xray Chest 1 View- PORTABLE-Routine in AM.) (06.16.22 @ 15:15) >  Frontal expiratory view of the chest shows the heart to be similar in size. The lungs show partial clearing of the lung bases with small right   effusion and there is no evidence of pneumothorax nor left pleural effusion.    IMPRESSION: Basilar clearing    D/D:  COVID infection with very mild symptoms  Generalized weakness due to underlying COVID infection resolving  Elevated troponin likely Demand ischemia s/p Fall and nonspecific  s/p Mechanical Fall  Type 2 DM controlled well  HTN controlled  Hx Hyperlipidemia    Plan:  Patient completed Remdesevir; no hypoxia; no need for steroids  PT eval independent no skilled needs  monitor O2 sat: saturating well on RA  Continue home meds; Metoprolol, Amlodipine and Losartan  resume Metformin; ISS; Resume Jardiance and Januvia on discharge (currently not available inhouse pharmacy); A1c 7.0  Sugars are low normal at this time as patient in a controlled setting with diet despite not on Jardiance and Januvia here but can resume on discharge  Continue Lovenox Inhouse; D-dimer appropriate for age  Cardio eval noted; No inpatient work up indicated; D/C telemetry   Labs stable no need to repeat daily unless clinical status changes can get Monday; can also repeat inflammatory markers    Discussed with patient findings and plan of care; D/C Plan once HHA reinstated;  d/w  Josefina; [facility] with [facility] was trying to get in touch with Home care agency to resume aide services for tomorrow  Discussed with friend Jaylene over the phone and updated with clinical status and stable for discharge; also informed if needs Tele bed patient may be moved to another floor as patient is off tele; asked if CXR needs rpeated; I explained only if clinical status worsens with fever, SOB, WBC count rises..  Discussed with  PGY1 Dr. Howard and RN

## 2022-06-19 NOTE — PROGRESS NOTE ADULT - SUBJECTIVE AND OBJECTIVE BOX
PGY-1 Progress Note discussed with attending    PAGER #: [[phone]] TILL 5:00 PM  PLEASE CONTACT ON CALL TEAM:  - On Call Team (Please refer to Toni) FROM 5:00 PM - 8:30PM  - Nightfloat Team FROM 8:30 -7:30 AM    CHIEF COMPLAINT & BRIEF HOSPITAL COURSE:    INTERVAL HPI/OVERNIGHT EVENTS:   Patient seen and examined at bedside. No acute events overnight.    REVIEW OF SYSTEMS:  CONSTITUTIONAL: No fever, weight loss, or fatigue  RESPIRATORY: No cough, wheezing, chills or hemoptysis; No shortness of breath  CARDIOVASCULAR: No chest pain, palpitations, dizziness, or leg swelling  GASTROINTESTINAL: No abdominal pain. No nausea, vomiting, or hematemesis; No diarrhea or constipation. No melena or hematochezia.  GENITOURINARY: No dysuria or hematuria, urinary frequency  NEUROLOGICAL: No headaches, memory loss, loss of strength, numbness, or tremors  SKIN: No itching, burning, rashes, or lesions     amLODIPine   Tablet 5 milliGRAM(s) Oral daily  aspirin  chewable 81 milliGRAM(s) Oral daily  enoxaparin Injectable 40 milliGRAM(s) SubCutaneous every 24 hours  insulin lispro (ADMELOG) corrective regimen sliding scale   SubCutaneous three times a day before meals  insulin lispro (ADMELOG) corrective regimen sliding scale   SubCutaneous at bedtime  losartan 100 milliGRAM(s) Oral daily  melatonin 3 milliGRAM(s) Oral at bedtime  metFORMIN 500 milliGRAM(s) Oral two times a day  metoprolol succinate ER 25 milliGRAM(s) Oral daily  pantoprazole    Tablet 40 milliGRAM(s) Oral before breakfast  simvastatin 20 milliGRAM(s) Oral at bedtime      Vital Signs Last 24 Hrs  T(C): 36.6 (19 Jun 2022 04:50), Max: 36.7 (18 Jun 2022 11:52)  T(F): 97.8 (19 Jun 2022 04:50), Max: 98.1 (18 Jun 2022 23:28)  HR: 54 (19 Jun 2022 04:50) (53 - 62)  BP: 146/68 (19 Jun 2022 04:50) (129/75 - 164/85)  BP(mean): --  RR: 18 (19 Jun 2022 04:50) (18 - 18)  SpO2: 94% (19 Jun 2022 04:50) (93% - 95%)    PHYSICAL EXAMINATION:  GENERAL: NAD  HEAD:  Atraumatic, Normocephalic  EYES:  conjunctiva and sclera clear  NECK: Supple, No JVD, Normal thyroid  CHEST/LUNG: Clear to auscultation; No rales, rhonchi, wheezing, or rubs  HEART: Regular rate and rhythm; No murmurs, rubs, or gallops  ABDOMEN: Soft, Nontender, Nondistended; Bowel sounds present  NERVOUS SYSTEM:  Alert & Oriented X3,   EXTREMITIES:  2+ Peripheral Pulses, No clubbing, cyanosis, or edema  SKIN: warm dry                          15.0   3.73  )-----------( 176      ( 17 Jun 2022 08:01 )             44.5     06-17    142  |  107  |  10  ----------------------------<  154<H>  3.8   |  28  |  0.78    Ca    8.9      17 Jun 2022 08:01    TPro  7.0  /  Alb  3.0<L>  /  TBili  0.5  /  DBili  x   /  AST  14  /  ALT  18  /  AlkPhos  86  06-17    LIVER FUNCTIONS - ( 17 Jun 2022 08:01 )  Alb: 3.0 g/dL / Pro: 7.0 g/dL / ALK PHOS: 86 U/L / ALT: 18 U/L DA / AST: 14 U/L / GGT: x               PT/INR - ( 17 Jun 2022 08:01 )   PT: 11.9 sec;   INR: 1.00 ratio             CAPILLARY BLOOD GLUCOSE      RADIOLOGY & ADDITIONAL TESTS:           PGY-1 Progress Note discussed with attending    PAGER #: [[phone]] TILL 5:00 PM  PLEASE CONTACT ON CALL TEAM:  - On Call Team (Please refer to Toni) FROM 5:00 PM - 8:30PM  - Nightfloat Team FROM 8:30 -7:30 AM    CHIEF COMPLAINT & BRIEF HOSPITAL COURSE:    INTERVAL HPI/OVERNIGHT EVENTS:   Patient seen and examined at bedside. No acute events overnight. Patient endorses R abdominal pain whenever he coughs.    REVIEW OF SYSTEMS:  CONSTITUTIONAL: No fever, weight loss, or fatigue  RESPIRATORY: No cough, wheezing, chills or hemoptysis; No shortness of breath  CARDIOVASCULAR: No chest pain, palpitations, dizziness, or leg swelling  GASTROINTESTINAL: (+)R abd pain associated with coughing. No nausea, vomiting, or hematemesis; No diarrhea or constipation. No melena or hematochezia.  GENITOURINARY: No dysuria or hematuria, urinary frequency  NEUROLOGICAL: No headaches, memory loss, loss of strength, numbness, or tremors  SKIN: No itching, burning, rashes, or lesions     amLODIPine   Tablet 5 milliGRAM(s) Oral daily  aspirin  chewable 81 milliGRAM(s) Oral daily  enoxaparin Injectable 40 milliGRAM(s) SubCutaneous every 24 hours  insulin lispro (ADMELOG) corrective regimen sliding scale   SubCutaneous three times a day before meals  insulin lispro (ADMELOG) corrective regimen sliding scale   SubCutaneous at bedtime  losartan 100 milliGRAM(s) Oral daily  melatonin 3 milliGRAM(s) Oral at bedtime  metFORMIN 500 milliGRAM(s) Oral two times a day  metoprolol succinate ER 25 milliGRAM(s) Oral daily  pantoprazole    Tablet 40 milliGRAM(s) Oral before breakfast  simvastatin 20 milliGRAM(s) Oral at bedtime      Vital Signs Last 24 Hrs  T(C): 36.6 (19 Jun 2022 04:50), Max: 36.7 (18 Jun 2022 11:52)  T(F): 97.8 (19 Jun 2022 04:50), Max: 98.1 (18 Jun 2022 23:28)  HR: 54 (19 Jun 2022 04:50) (53 - 62)  BP: 146/68 (19 Jun 2022 04:50) (129/75 - 164/85)  BP(mean): --  RR: 18 (19 Jun 2022 04:50) (18 - 18)  SpO2: 94% (19 Jun 2022 04:50) (93% - 95%)    PHYSICAL EXAMINATION:  GENERAL: NAD  HEAD:  Atraumatic, Normocephalic  EYES:  conjunctiva and sclera clear  NECK: Supple, No JVD, Normal thyroid  CHEST/LUNG: Clear to auscultation; No rales, rhonchi, wheezing, or rubs  HEART: Regular rate and rhythm; No murmurs, rubs, or gallops  ABDOMEN: Soft, Nontender, Nondistended; Bowel sounds present  NERVOUS SYSTEM:  Alert & Oriented X3,   EXTREMITIES:  2+ Peripheral Pulses, No clubbing, cyanosis, or edema  SKIN: warm dry                          15.0   3.73  )-----------( 176      ( 17 Jun 2022 08:01 )             44.5     06-17    142  |  107  |  10  ----------------------------<  154<H>  3.8   |  28  |  0.78    Ca    8.9      17 Jun 2022 08:01    TPro  7.0  /  Alb  3.0<L>  /  TBili  0.5  /  DBili  x   /  AST  14  /  ALT  18  /  AlkPhos  86  06-17    LIVER FUNCTIONS - ( 17 Jun 2022 08:01 )  Alb: 3.0 g/dL / Pro: 7.0 g/dL / ALK PHOS: 86 U/L / ALT: 18 U/L DA / AST: 14 U/L / GGT: x               PT/INR - ( 17 Jun 2022 08:01 )   PT: 11.9 sec;   INR: 1.00 ratio             CAPILLARY BLOOD GLUCOSE      RADIOLOGY & ADDITIONAL TESTS:

## 2022-06-19 NOTE — PROGRESS NOTE ADULT - PROBLEM SELECTOR PLAN 3
- Patient had weakness   - found with ANTONIA-COV-2 +ve   - CXR shows sequale from previous pro  - Elevated hemidiaphragm on the right  - repeat CXR = partial clearing of the lung bases with small right effusion  - Monitor for hypoxia  - D Dimer wnl at 418 (age adjusted D dimer upper limit is 430)  - no indication for decadron for now  - Remdesivir for total of 3 days due to age and risk of developing severe covid

## 2022-06-20 DIAGNOSIS — Z02.9 ENCOUNTER FOR ADMINISTRATIVE EXAMINATIONS, UNSPECIFIED: ICD-10-CM

## 2022-06-20 LAB
CULTURE RESULTS: SIGNIFICANT CHANGE UP
CULTURE RESULTS: SIGNIFICANT CHANGE UP
GLUCOSE BLDC GLUCOMTR-MCNC: 107 MG/DL — HIGH (ref 70–99)
GLUCOSE BLDC GLUCOMTR-MCNC: 120 MG/DL — HIGH (ref 70–99)
GLUCOSE BLDC GLUCOMTR-MCNC: 122 MG/DL — HIGH (ref 70–99)
GLUCOSE BLDC GLUCOMTR-MCNC: 143 MG/DL — HIGH (ref 70–99)
SPECIMEN SOURCE: SIGNIFICANT CHANGE UP
SPECIMEN SOURCE: SIGNIFICANT CHANGE UP

## 2022-06-20 PROCEDURE — 99232 SBSQ HOSP IP/OBS MODERATE 35: CPT | Mod: GC

## 2022-06-20 RX ADMIN — ENOXAPARIN SODIUM 40 MILLIGRAM(S): 100 INJECTION SUBCUTANEOUS at 05:54

## 2022-06-20 RX ADMIN — METFORMIN HYDROCHLORIDE 500 MILLIGRAM(S): 850 TABLET ORAL at 05:53

## 2022-06-20 RX ADMIN — PANTOPRAZOLE SODIUM 40 MILLIGRAM(S): 20 TABLET, DELAYED RELEASE ORAL at 06:21

## 2022-06-20 RX ADMIN — Medication 25 MILLIGRAM(S): at 05:53

## 2022-06-20 RX ADMIN — AMLODIPINE BESYLATE 5 MILLIGRAM(S): 2.5 TABLET ORAL at 05:53

## 2022-06-20 RX ADMIN — Medication 600 MILLIGRAM(S): at 05:53

## 2022-06-20 RX ADMIN — Medication 3 MILLIGRAM(S): at 22:52

## 2022-06-20 RX ADMIN — LOSARTAN POTASSIUM 100 MILLIGRAM(S): 100 TABLET, FILM COATED ORAL at 05:52

## 2022-06-20 RX ADMIN — SIMVASTATIN 20 MILLIGRAM(S): 20 TABLET, FILM COATED ORAL at 22:51

## 2022-06-20 RX ADMIN — METFORMIN HYDROCHLORIDE 500 MILLIGRAM(S): 850 TABLET ORAL at 17:05

## 2022-06-20 RX ADMIN — Medication 81 MILLIGRAM(S): at 12:31

## 2022-06-20 NOTE — PROGRESS NOTE ADULT - ATTENDING COMMENTS
Patient seen earlier this afternoon    87 y.o. male with PMH of NIDDM, HTN, obesity, admitted with  2 days of generalized weakness and low PO intake  Also presents with a dry cough 2 days complicated by fall, with girl friend as sick contact, similar episode in the past 3/15/2021 for COVID with  now found again with COVID,  Evelyn-CoV 2 +ve. mildly elevated troponin 106, BNP wnl, Admitted for elevated trops and supportive care of COVID    Patient doing well; cough improved with Mucinex but still worried. No other complaints. Saturating well on room air currently.  Patient lives alone has 4 flight of stairs in his apartment building and the elevator not working. Was unable to go up the stairs. Informed by friend Jaylene yesterday that patient has 5 hrs HHA 7 days a week which needs reinstated and was informed by  on Friday that patient has to stay until Monday.     denies fever, chills, SOB, palpitations, chest pain, nausea, vomiting, diarrhea, constipation, dizziness    Vital Signs Last 24 Hrs  T(C): 36.5 (20 Jun 2022 15:30), Max: 36.7 (20 Jun 2022 04:37)  T(F): 97.7 (20 Jun 2022 15:30), Max: 98 (20 Jun 2022 04:37)  HR: 55 (20 Jun 2022 15:30) (53 - 59)  BP: 148/77 (20 Jun 2022 15:30) (134/63 - 155/81)  RR: 18 (20 Jun 2022 15:30) (18 - 18)  SpO2: 97% (20 Jun 2022 15:30) (94% - 97%)      P/E: as above; clinically unchanged    Labs: stable 6/17/22; repeat Monday              Xray Chest 1 View- PORTABLE-Routine (Xray Chest 1 View- PORTABLE-Routine in AM.) (06.16.22 @ 15:15) >  Frontal expiratory view of the chest shows the heart to be similar in size. The lungs show partial clearing of the lung bases with small right   effusion and there is no evidence of pneumothorax nor left pleural effusion.    IMPRESSION: Basilar clearing    D/D:  COVID infection with very mild symptoms  Generalized weakness due to underlying COVID infection resolving  Elevated troponin likely Demand ischemia s/p Fall and nonspecific  s/p Mechanical Fall  Type 2 DM controlled well  HTN controlled  Hx Hyperlipidemia    Plan:  Patient completed Remdesevir; no hypoxia; no need for steroids  PT eval independent no skilled needs  monitor O2 sat: saturating well on RA  Continue home meds; Metoprolol, Amlodipine and Losartan  resume Metformin; ISS; Resume Jardiance and Januvia on discharge (currently not available inhouse pharmacy); A1c 7.0  Sugars are low normal at this time as patient in a controlled setting with diet despite not on Jardiance and Januvia here but can resume on discharge  Continue Lovenox Inhouse; D-dimer appropriate for age  Cardio eval noted; No inpatient work up indicated; D/C telemetry   Labs stable no need to repeat daily unless clinical status changes can get Monday; can also repeat inflammatory markers    Discussed with patient findings and plan of care; D/C Plan once HHA reinstated;  d/w  Josefina; [facility] get in touch with Home care agency to resume aide services for tomorrow  Discussed with friend Jaylene over the phone and updated with clinical status and stable for discharge; also informed if needs Tele bed patient may be moved to another floor as patient is off tele; asked if CXR needs rpeated; I explained only if clinical status worsens with fever, SOB, WBC count rises..  Discussed with  PGY1 Dr. Howard and RN Patient seen earlier this morning and translated by girlfriend Jaylene over phone.     87 y.o. male with PMH of NIDDM, HTN, obesity, admitted with  2 days of generalized weakness and low PO intake  Also presents with a dry cough 2 days complicated by fall, with girl friend as sick contact, similar episode in the past 3/15/2021 for COVID with  now found again with COVID,  Evelyn-CoV 2 +ve. mildly elevated troponin 106, BNP wnl, Admitted for elevated trops and supportive care of COVID    Patient doing well; cough improved with Mucinex; minimal now; No other complaints. Saturating well on room air currently.  Patient lives alone has 4 flight of stairs in his apartment building and the elevator not working. Was unable to go up the stairs. patient has 5 hrs HHA 7 days a week which needs reinstated and was informed by  on Friday still awaiting HHA availability    Patient denies fever, chills, SOB, palpitations, chest pain, nausea, vomiting, diarrhea, constipation, dizziness    Vital Signs Last 24 Hrs  T(C): 36.5 (20 Jun 2022 15:30), Max: 36.7 (20 Jun 2022 04:37)  T(F): 97.7 (20 Jun 2022 15:30), Max: 98 (20 Jun 2022 04:37)  HR: 55 (20 Jun 2022 15:30) (53 - 59)  BP: 148/77 (20 Jun 2022 15:30) (134/63 - 155/81)  RR: 18 (20 Jun 2022 15:30) (18 - 18)  SpO2: 97% (20 Jun 2022 15:30) (94% - 97%)    P/E: as above; clinically unchanged    Labs: stable 6/17/22; repeat AM prior to D/C;       Xray Chest 1 View- PORTABLE-Routine (Xray Chest 1 View- PORTABLE-Routine in AM.) (06.16.22 @ 15:15) >    IMPRESSION: Basilar clearing    D/D:  COVID infection with very mild symptoms  Generalized weakness due to underlying COVID infection resolving  Elevated troponin likely Demand ischemia s/p Fall and nonspecific  s/p Mechanical Fall  Type 2 DM controlled well  HTN controlled  Hx Hyperlipidemia    Plan:  Patient completed Remdesevir; no hypoxia; no need for steroids  PT eval independent no skilled needs  monitor O2 sat: saturating well on RA  Continue home meds; Metoprolol, Amlodipine and Losartan  Continue Metformin; ISS; Resume Jardiance and Januvia on discharge (currently not available inhouse pharmacy); A1c 7.0  Sugars are low normal at this time as patient in a controlled setting with diet despite not on Jardiance and Januvia here but can resume on discharge  Continue Lovenox Inhouse; D-dimer appropriate for age  Cardio eval noted; No inpatient work up indicated; off telemetry   Labs stable 6/17; repeat AM;  can also repeat inflammatory markers    Discussed with patient findings and plan of care; D/C Plan once HHA reinstated;  d/w  Josefina; spoke with [facility] finally aide will be home 12 PM so will arrange transport for 11 AM.   Discussed with friend Jaylene over the phone and updated with clinical status and stable for discharge;   Discussed with  PGY1 Dr. Howard and RN

## 2022-06-20 NOTE — PROGRESS NOTE ADULT - SUBJECTIVE AND OBJECTIVE BOX
C A R D I O L O G Y  **********************************     DATE OF SERVICE: 06-20-22    Patient denies chest pain or shortness of breath.   Review of symptoms otherwise negative.    amLODIPine   Tablet 5 milliGRAM(s) Oral daily  aspirin  chewable 81 milliGRAM(s) Oral daily  enoxaparin Injectable 40 milliGRAM(s) SubCutaneous every 24 hours  insulin lispro (ADMELOG) corrective regimen sliding scale   SubCutaneous three times a day before meals  insulin lispro (ADMELOG) corrective regimen sliding scale   SubCutaneous at bedtime  losartan 100 milliGRAM(s) Oral daily  melatonin 3 milliGRAM(s) Oral at bedtime  metFORMIN 500 milliGRAM(s) Oral two times a day  metoprolol succinate ER 25 milliGRAM(s) Oral daily  pantoprazole    Tablet 40 milliGRAM(s) Oral before breakfast  simvastatin 20 milliGRAM(s) Oral at bedtime          Hemoglobin: 15.0 g/dL (06-17 @ 08:01)  Hemoglobin: 13.2 g/dL (06-16 @ 06:14)            Creatinine Trend: 0.78<--, 0.78<--, 0.78<--, 0.82<--    COAGS:           T(C): 36.5 (06-20-22 @ 15:30), Max: 36.7 (06-20-22 @ 04:37)  HR: 55 (06-20-22 @ 15:30) (53 - 60)  BP: 148/77 (06-20-22 @ 15:30) (134/63 - 162/82)  RR: 18 (06-20-22 @ 15:30) (18 - 18)  SpO2: 97% (06-20-22 @ 15:30) (94% - 97%)  Wt(kg): --    I&O's Summary    HEENT:  (-)icterus (-)pallor  CV: N S1 S2 1/6 REBEKAH (+)2 Pulses B/l  Resp:  Clear to ausculatation B/L, normal effort  GI: (+) BS Soft, NT, ND  Lymph:  trace edema, (-)obvious lymphadenopathy  Skin: Warm to touch, Normal turgor  Psych: Appropriate mood and affect            ASSESSMENT/PLAN: 	87y  Male  NIDDM, HTN, obesity, normal LV and R function on echo performed in our office 1 year ago, normal perfusion on a treadmill nuclear stress 12/21, non-vax,  p/w  2 days,  weakness, low PO covid (+) trace (+) trop of questionable clinical significance,     - clinical presentation is inconsistent with ACS.  Positive trop ? due to falling on Buttock  - DDimmer appropriate for AGE  - treatment of COVID per medical team  - No need for further inpatient cardiac work up expected at this time   - D/C plan per primary team  - outp cardaiac f/u with Dr. Geeta Jack MD, [facility]  BEEPER [phone]

## 2022-06-20 NOTE — PROGRESS NOTE ADULT - SUBJECTIVE AND OBJECTIVE BOX
PGY-1 Progress Note discussed with attending    PAGER #: [[phone]] TILL 5:00 PM  PLEASE CONTACT ON CALL TEAM:  - On Call Team (Please refer to Toni) FROM 5:00 PM - 8:30PM  - Nightfloat Team FROM 8:30 -7:30 AM    CHIEF COMPLAINT & BRIEF HOSPITAL COURSE:    INTERVAL HPI/OVERNIGHT EVENTS:   Patient seen and examined at bedside. No acute events overnight.    REVIEW OF SYSTEMS:  CONSTITUTIONAL: No fever, weight loss, or fatigue  RESPIRATORY: No cough, wheezing, chills or hemoptysis; No shortness of breath  CARDIOVASCULAR: No chest pain, palpitations, dizziness, or leg swelling  GASTROINTESTINAL: No abdominal pain. No nausea, vomiting, or hematemesis; No diarrhea or constipation. No melena or hematochezia.  GENITOURINARY: No dysuria or hematuria, urinary frequency  NEUROLOGICAL: No headaches, memory loss, loss of strength, numbness, or tremors  SKIN: No itching, burning, rashes, or lesions     amLODIPine   Tablet 5 milliGRAM(s) Oral daily  aspirin  chewable 81 milliGRAM(s) Oral daily  enoxaparin Injectable 40 milliGRAM(s) SubCutaneous every 24 hours  insulin lispro (ADMELOG) corrective regimen sliding scale   SubCutaneous three times a day before meals  insulin lispro (ADMELOG) corrective regimen sliding scale   SubCutaneous at bedtime  losartan 100 milliGRAM(s) Oral daily  melatonin 3 milliGRAM(s) Oral at bedtime  metFORMIN 500 milliGRAM(s) Oral two times a day  metoprolol succinate ER 25 milliGRAM(s) Oral daily  pantoprazole    Tablet 40 milliGRAM(s) Oral before breakfast  simvastatin 20 milliGRAM(s) Oral at bedtime      Vital Signs Last 24 Hrs  T(C): 36.7 (20 Jun 2022 04:37), Max: 36.7 (20 Jun 2022 04:37)  T(F): 98 (20 Jun 2022 04:37), Max: 98 (20 Jun 2022 04:37)  HR: 59 (20 Jun 2022 04:37) (55 - 61)  BP: 134/63 (20 Jun 2022 04:37) (134/63 - 162/82)  BP(mean): 108 (19 Jun 2022 11:38) (103 - 108)  RR: 18 (20 Jun 2022 04:37) (18 - 18)  SpO2: 94% (20 Jun 2022 04:37) (94% - 97%)    PHYSICAL EXAMINATION:  GENERAL: NAD  HEAD:  Atraumatic, Normocephalic  EYES:  conjunctiva and sclera clear  NECK: Supple, No JVD, Normal thyroid  CHEST/LUNG: Clear to auscultation; No rales, rhonchi, wheezing, or rubs  HEART: Regular rate and rhythm; No murmurs, rubs, or gallops  ABDOMEN: Soft, Nontender, Nondistended; Bowel sounds present  NERVOUS SYSTEM:  Alert & Oriented X3,   EXTREMITIES:  2+ Peripheral Pulses, No clubbing, cyanosis, or edema  SKIN: warm dry                      CAPILLARY BLOOD GLUCOSE      RADIOLOGY & ADDITIONAL TESTS:           PGY-1 Progress Note discussed with attending    PAGER #: [[phone]] TILL 5:00 PM  PLEASE CONTACT ON CALL TEAM:  - On Call Team (Please refer to Toni) FROM 5:00 PM - 8:30PM  - Nightfloat Team FROM 8:30 -7:30 AM    CHIEF COMPLAINT & BRIEF HOSPITAL COURSE:    INTERVAL HPI/OVERNIGHT EVENTS:   Patient seen and examined at bedside. No acute events overnight. Patient denies any complaints    REVIEW OF SYSTEMS:  CONSTITUTIONAL: No fever, weight loss, or fatigue  RESPIRATORY: No cough, wheezing, chills or hemoptysis; No shortness of breath  CARDIOVASCULAR: No chest pain, palpitations, dizziness, or leg swelling  GASTROINTESTINAL: No abdominal pain. No nausea, vomiting, or hematemesis; No diarrhea or constipation. No melena or hematochezia.  GENITOURINARY: No dysuria or hematuria, urinary frequency  NEUROLOGICAL: No headaches, memory loss, loss of strength, numbness, or tremors  SKIN: No itching, burning, rashes, or lesions     amLODIPine   Tablet 5 milliGRAM(s) Oral daily  aspirin  chewable 81 milliGRAM(s) Oral daily  enoxaparin Injectable 40 milliGRAM(s) SubCutaneous every 24 hours  insulin lispro (ADMELOG) corrective regimen sliding scale   SubCutaneous three times a day before meals  insulin lispro (ADMELOG) corrective regimen sliding scale   SubCutaneous at bedtime  losartan 100 milliGRAM(s) Oral daily  melatonin 3 milliGRAM(s) Oral at bedtime  metFORMIN 500 milliGRAM(s) Oral two times a day  metoprolol succinate ER 25 milliGRAM(s) Oral daily  pantoprazole    Tablet 40 milliGRAM(s) Oral before breakfast  simvastatin 20 milliGRAM(s) Oral at bedtime      Vital Signs Last 24 Hrs  T(C): 36.7 (20 Jun 2022 04:37), Max: 36.7 (20 Jun 2022 04:37)  T(F): 98 (20 Jun 2022 04:37), Max: 98 (20 Jun 2022 04:37)  HR: 59 (20 Jun 2022 04:37) (55 - 61)  BP: 134/63 (20 Jun 2022 04:37) (134/63 - 162/82)  BP(mean): 108 (19 Jun 2022 11:38) (103 - 108)  RR: 18 (20 Jun 2022 04:37) (18 - 18)  SpO2: 94% (20 Jun 2022 04:37) (94% - 97%)    PHYSICAL EXAMINATION:  GENERAL: NAD  HEAD:  Atraumatic, Normocephalic  EYES:  conjunctiva and sclera clear  NECK: Supple, No JVD, Normal thyroid  CHEST/LUNG: Clear to auscultation; No rales, rhonchi, wheezing, or rubs  HEART: Regular rate and rhythm; No murmurs, rubs, or gallops  ABDOMEN: Soft, Nontender, Nondistended; Bowel sounds present  NERVOUS SYSTEM:  Alert & Oriented X3,   EXTREMITIES:  2+ Peripheral Pulses, No clubbing, cyanosis, or edema  SKIN: warm dry                      CAPILLARY BLOOD GLUCOSE      RADIOLOGY & ADDITIONAL TESTS:

## 2022-06-20 NOTE — PROGRESS NOTE ADULT - ASSESSMENT
Attending addendum:   Agree with above  Treatment of COVID per primary team   No further inpatient cardiac workup needed at this time.     Edmond Gramajo MD 
D/D:  COVID infection with very mild symptoms  Generalized weakness due to underlying COVID infection resolving  Elevated troponin likely Demand ischemia s/p Fall and nonspecific  s/p Mechanical Fall  Type 2 DM controlled well  HTN controlled  Hx Hyperlipidemia    Plan:  Continue Remdesvir for while Inhouse given patient is high risk with his age and comorbidities; last day tomorrow  PT eval independent no skilled needs  monitor O2 sat: saturating well on RA  Continue home meds; Metoprolol, Amlodipine and Losartan  resume Metformin; ISS; Resume Jardiance and Januvia on discharge (currently not available inhouse pharmacy)  Continue Lovenox Inhouse; D-dimer appropriate for age  Cardio eval noted; No inpatient work up indicated; D/C telemetry   Labs stable no need to repeat daily unless clinical status changes can get Monday; can also repeat inflammatory markers    Discussed with patient findings and plan of care; D/C Plan once HHA reinstated;    Discussed with friend Jaylene at bedside and she translated; reviewed clinical status and stable for discharge; also informed if needs Tele bed patient may be moved to another floor  Discussed with  RN.
87 y.o. male with h/o NIDDM, HTN, obesity, CVI,  non-vax,  p/w  2 days,  weakness, low PO.  Also presents with a dry cough 2 days complicated by fall,  with girl friend as sick contact, similar episode in the past 3/15/2021 for COVID with  now found with NAD, mild edema, vital stable with some hypertension, NSR, troponin 106, BNP wnl, COVID,  Evelyn-CoV 2 +ve. Admitted for trops and supportive care of COVID  
Patient is 87 y.o. male with h/o NIDDM, HTN, obesity, CVI,  non-vax,  p/w  2 days,  weakness, low PO.  Also presents with a dry cough 2 days prompting him to go down the stairs (due to broken elevator) to get COVID tested,  at which point he fell on his buttock.   States that yesterday  his legs felt weak, and he decided to "sit down" on the steps. Denies any injury, LOC. NO travel.   Patient felt warm this morning, and took tylenol.  Patient has had similar symptoms when he was admitted 3/15/2021 for COVID with superimposed PNA, that required oxygen, and had "fluid in the lungs" requiring diuretic, Pt was eventually dc on home O2. Now he reports that since then he has chronic shortness of breath after walking a couple blocks, chest pain, sob at rest, changes in bowel bladder function. Patient admitted to medicine for elevated troponin, cardiology consulted, clinical presentation is inconsistent with ACS. EKG negative for ischemia, patient admission complicated by COVID positive. Patient is unvaccinated, but remains asymptomatic saturating 94- 95% on RA. 
87 y.o. male with h/o NIDDM, HTN, obesity, CVI,  non-vax,  p/w  2 days,  weakness, low PO.  Also presents with a dry cough 2 days complicated by fall,  with girl friend as sick contact, similar episode in the past 3/15/2021 for COVID with  now found with NAD, mild edema, vital stable with some hypertension, NSR, troponin 106, BNP wnl, COVID,  Evelyn-CoV 2 +ve. Admitted for trops and supportive care of COVID  
87 y.o. male with h/o NIDDM, HTN, obesity, CVI,  non-vax,  p/w  2 days,  weakness, low PO.  Also presents with a dry cough 2 days complicated by fall,  with girl friend as sick contact, similar episode in the past 3/15/2021 for COVID with  now found with NAD, mild edema, vital stable with some hypertension, NSR, troponin 106, BNP wnl, COVID,  Evelyn-CoV 2 +ve. Admitted for trops and supportive care of COVID

## 2022-06-21 ENCOUNTER — TRANSCRIPTION ENCOUNTER (OUTPATIENT)
Age: 87
End: 2022-06-21

## 2022-06-21 VITALS
RESPIRATION RATE: 18 BRPM | SYSTOLIC BLOOD PRESSURE: 148 MMHG | DIASTOLIC BLOOD PRESSURE: 82 MMHG | OXYGEN SATURATION: 96 % | TEMPERATURE: 98 F

## 2022-06-21 LAB
ALBUMIN SERPL ELPH-MCNC: 2.8 G/DL — LOW (ref 3.5–5)
ALP SERPL-CCNC: 76 U/L — SIGNIFICANT CHANGE UP (ref 40–120)
ALT FLD-CCNC: 17 U/L DA — SIGNIFICANT CHANGE UP (ref 10–60)
ANION GAP SERPL CALC-SCNC: 7 MMOL/L — SIGNIFICANT CHANGE UP (ref 5–17)
AST SERPL-CCNC: 13 U/L — SIGNIFICANT CHANGE UP (ref 10–40)
BILIRUB SERPL-MCNC: 0.7 MG/DL — SIGNIFICANT CHANGE UP (ref 0.2–1.2)
BUN SERPL-MCNC: 15 MG/DL — SIGNIFICANT CHANGE UP (ref 7–18)
CALCIUM SERPL-MCNC: 8.8 MG/DL — SIGNIFICANT CHANGE UP (ref 8.4–10.5)
CHLORIDE SERPL-SCNC: 108 MMOL/L — SIGNIFICANT CHANGE UP (ref 96–108)
CO2 SERPL-SCNC: 27 MMOL/L — SIGNIFICANT CHANGE UP (ref 22–31)
CREAT SERPL-MCNC: 0.64 MG/DL — SIGNIFICANT CHANGE UP (ref 0.5–1.3)
EGFR: 92 ML/MIN/1.73M2 — SIGNIFICANT CHANGE UP
GLUCOSE BLDC GLUCOMTR-MCNC: 115 MG/DL — HIGH (ref 70–99)
GLUCOSE BLDC GLUCOMTR-MCNC: 119 MG/DL — HIGH (ref 70–99)
GLUCOSE SERPL-MCNC: 120 MG/DL — HIGH (ref 70–99)
HCT VFR BLD CALC: 41.7 % — SIGNIFICANT CHANGE UP (ref 39–50)
HGB BLD-MCNC: 14 G/DL — SIGNIFICANT CHANGE UP (ref 13–17)
MAGNESIUM SERPL-MCNC: 2 MG/DL — SIGNIFICANT CHANGE UP (ref 1.6–2.6)
MCHC RBC-ENTMCNC: 30.4 PG — SIGNIFICANT CHANGE UP (ref 27–34)
MCHC RBC-ENTMCNC: 33.6 GM/DL — SIGNIFICANT CHANGE UP (ref 32–36)
MCV RBC AUTO: 90.7 FL — SIGNIFICANT CHANGE UP (ref 80–100)
NRBC # BLD: 0 /100 WBCS — SIGNIFICANT CHANGE UP (ref 0–0)
PHOSPHATE SERPL-MCNC: 3.1 MG/DL — SIGNIFICANT CHANGE UP (ref 2.5–4.5)
PLATELET # BLD AUTO: 204 K/UL — SIGNIFICANT CHANGE UP (ref 150–400)
POTASSIUM SERPL-MCNC: 3.8 MMOL/L — SIGNIFICANT CHANGE UP (ref 3.5–5.3)
POTASSIUM SERPL-SCNC: 3.8 MMOL/L — SIGNIFICANT CHANGE UP (ref 3.5–5.3)
PROT SERPL-MCNC: 5.9 G/DL — LOW (ref 6–8.3)
RBC # BLD: 4.6 M/UL — SIGNIFICANT CHANGE UP (ref 4.2–5.8)
RBC # FLD: 12.3 % — SIGNIFICANT CHANGE UP (ref 10.3–14.5)
SODIUM SERPL-SCNC: 142 MMOL/L — SIGNIFICANT CHANGE UP (ref 135–145)
WBC # BLD: 4.4 K/UL — SIGNIFICANT CHANGE UP (ref 3.8–10.5)
WBC # FLD AUTO: 4.4 K/UL — SIGNIFICANT CHANGE UP (ref 3.8–10.5)

## 2022-06-21 PROCEDURE — 82607 VITAMIN B-12: CPT

## 2022-06-21 PROCEDURE — 99285 EMERGENCY DEPT VISIT HI MDM: CPT | Mod: 25

## 2022-06-21 PROCEDURE — 83880 ASSAY OF NATRIURETIC PEPTIDE: CPT

## 2022-06-21 PROCEDURE — 81001 URINALYSIS AUTO W/SCOPE: CPT

## 2022-06-21 PROCEDURE — 80053 COMPREHEN METABOLIC PANEL: CPT

## 2022-06-21 PROCEDURE — 85379 FIBRIN DEGRADATION QUANT: CPT

## 2022-06-21 PROCEDURE — 82565 ASSAY OF CREATININE: CPT

## 2022-06-21 PROCEDURE — 84443 ASSAY THYROID STIM HORMONE: CPT

## 2022-06-21 PROCEDURE — 93005 ELECTROCARDIOGRAM TRACING: CPT

## 2022-06-21 PROCEDURE — 85025 COMPLETE CBC W/AUTO DIFF WBC: CPT

## 2022-06-21 PROCEDURE — 99239 HOSP IP/OBS DSCHRG MGMT >30: CPT

## 2022-06-21 PROCEDURE — 82009 KETONE BODYS QUAL: CPT

## 2022-06-21 PROCEDURE — 85610 PROTHROMBIN TIME: CPT

## 2022-06-21 PROCEDURE — 84484 ASSAY OF TROPONIN QUANT: CPT

## 2022-06-21 PROCEDURE — 83036 HEMOGLOBIN GLYCOSYLATED A1C: CPT

## 2022-06-21 PROCEDURE — 84100 ASSAY OF PHOSPHORUS: CPT

## 2022-06-21 PROCEDURE — 87086 URINE CULTURE/COLONY COUNT: CPT

## 2022-06-21 PROCEDURE — 83605 ASSAY OF LACTIC ACID: CPT

## 2022-06-21 PROCEDURE — 36415 COLL VENOUS BLD VENIPUNCTURE: CPT

## 2022-06-21 PROCEDURE — 82550 ASSAY OF CK (CPK): CPT

## 2022-06-21 PROCEDURE — 96375 TX/PRO/DX INJ NEW DRUG ADDON: CPT

## 2022-06-21 PROCEDURE — 71045 X-RAY EXAM CHEST 1 VIEW: CPT

## 2022-06-21 PROCEDURE — 82746 ASSAY OF FOLIC ACID SERUM: CPT

## 2022-06-21 PROCEDURE — 80076 HEPATIC FUNCTION PANEL: CPT

## 2022-06-21 PROCEDURE — 87040 BLOOD CULTURE FOR BACTERIA: CPT

## 2022-06-21 PROCEDURE — 96374 THER/PROPH/DIAG INJ IV PUSH: CPT

## 2022-06-21 PROCEDURE — 83735 ASSAY OF MAGNESIUM: CPT

## 2022-06-21 PROCEDURE — 82962 GLUCOSE BLOOD TEST: CPT

## 2022-06-21 PROCEDURE — 85027 COMPLETE CBC AUTOMATED: CPT

## 2022-06-21 PROCEDURE — 86703 HIV-1/HIV-2 1 RESULT ANTBDY: CPT

## 2022-06-21 PROCEDURE — 0225U NFCT DS DNA&RNA 21 SARSCOV2: CPT

## 2022-06-21 RX ORDER — AMLODIPINE BESYLATE 2.5 MG/1
5 TABLET ORAL ONCE
Refills: 0 | Status: COMPLETED | OUTPATIENT
Start: 2022-06-21 | End: 2022-06-21

## 2022-06-21 RX ADMIN — METFORMIN HYDROCHLORIDE 500 MILLIGRAM(S): 850 TABLET ORAL at 06:00

## 2022-06-21 RX ADMIN — Medication 25 MILLIGRAM(S): at 06:05

## 2022-06-21 RX ADMIN — Medication 81 MILLIGRAM(S): at 11:09

## 2022-06-21 RX ADMIN — ENOXAPARIN SODIUM 40 MILLIGRAM(S): 100 INJECTION SUBCUTANEOUS at 05:59

## 2022-06-21 RX ADMIN — PANTOPRAZOLE SODIUM 40 MILLIGRAM(S): 20 TABLET, DELAYED RELEASE ORAL at 06:06

## 2022-06-21 RX ADMIN — AMLODIPINE BESYLATE 5 MILLIGRAM(S): 2.5 TABLET ORAL at 11:09

## 2022-06-21 RX ADMIN — LOSARTAN POTASSIUM 100 MILLIGRAM(S): 100 TABLET, FILM COATED ORAL at 05:59

## 2022-06-21 RX ADMIN — AMLODIPINE BESYLATE 5 MILLIGRAM(S): 2.5 TABLET ORAL at 05:59

## 2022-06-21 NOTE — DIETITIAN INITIAL EVALUATION ADULT - ADD RECOMMEND
1. Continue  1. Continue current diet order as tolerated. 2. Recommend Glucerna 1 oz can TID. 3. Recommend multivitamin. 4. Monitor po intake, weights, labs, skin integrity, and GI status.

## 2022-06-21 NOTE — DIETITIAN INITIAL EVALUATION ADULT - REASON FOR ADMISSION
Per chart, "Pt 88 y/o male with h/o NIDDM, HTN, obesity, CVI,  non-vax,  p/w  2 days of weakness and low po intake, and dry cough. Pt Covid +. Admitted other specified abnormalities of plasma proteins."

## 2022-06-21 NOTE — DIETITIAN INITIAL EVALUATION ADULT - OBTAIN WEEKLY WEIGHT
Abdominal pain 2/2 acute pancreatitis  liver lesions concerning for liver abscesses with recent cholecystotomy removal and bilary stent placement  Renal failure HD dependant  CAD  HTN  HLD  dm 2  AOCD    Plan:  Blood cx, IV ozsyn renal dosed  LR at 75ml/hr, clear liquids  holding insulin  GI consulted  resumed home meds  ??MRCP as oer gi  dvt/gi ppx  guarded prog yes

## 2022-06-21 NOTE — DIETITIAN INITIAL EVALUATION ADULT - SIGNS/SYMPTOMS
Pt with 2 days of weakness and Pt reports poor intake PTA. Pt with 2 days of weakness and reports poor intake PTA. Pt with SARS-CoV-2 positive. Pt with 2 days of weakness and reports poor intake PTA and in hospital.

## 2022-06-21 NOTE — DIETITIAN INITIAL EVALUATION ADULT - PERTINENT MEDS FT
MEDICATIONS  (STANDING):  amLODIPine   Tablet 5 milliGRAM(s) Oral daily  amLODIPine   Tablet 5 milliGRAM(s) Oral once  aspirin  chewable 81 milliGRAM(s) Oral daily  enoxaparin Injectable 40 milliGRAM(s) SubCutaneous every 24 hours  insulin lispro (ADMELOG) corrective regimen sliding scale   SubCutaneous three times a day before meals  insulin lispro (ADMELOG) corrective regimen sliding scale   SubCutaneous at bedtime  losartan 100 milliGRAM(s) Oral daily  melatonin 3 milliGRAM(s) Oral at bedtime  metFORMIN 500 milliGRAM(s) Oral two times a day  metoprolol succinate ER 25 milliGRAM(s) Oral daily  pantoprazole    Tablet 40 milliGRAM(s) Oral before breakfast  simvastatin 20 milliGRAM(s) Oral at bedtime    MEDICATIONS  (PRN):

## 2022-06-21 NOTE — PROGRESS NOTE ADULT - SUBJECTIVE AND OBJECTIVE BOX
C A R D I O L O G Y  **********************************     DATE OF SERVICE: 06-21-22    Patient denies chest pain or shortness of breath.   Review of symptoms otherwise negative.    amLODIPine   Tablet 5 milliGRAM(s) Oral daily  aspirin  chewable 81 milliGRAM(s) Oral daily  enoxaparin Injectable 40 milliGRAM(s) SubCutaneous every 24 hours  insulin lispro (ADMELOG) corrective regimen sliding scale   SubCutaneous three times a day before meals  insulin lispro (ADMELOG) corrective regimen sliding scale   SubCutaneous at bedtime  losartan 100 milliGRAM(s) Oral daily  melatonin 3 milliGRAM(s) Oral at bedtime  metFORMIN 500 milliGRAM(s) Oral two times a day  metoprolol succinate ER 25 milliGRAM(s) Oral daily  pantoprazole    Tablet 40 milliGRAM(s) Oral before breakfast  simvastatin 20 milliGRAM(s) Oral at bedtime                            14.0   4.40  )-----------( 204      ( 21 Jun 2022 06:16 )             41.7       Hemoglobin: 14.0 g/dL (06-21 @ 06:16)  Hemoglobin: 15.0 g/dL (06-17 @ 08:01)      06-21    142  |  108  |  15  ----------------------------<  120<H>  3.8   |  27  |  0.64    Ca    8.8      21 Jun 2022 06:16  Phos  3.1     06-21  Mg     2.0     06-21    TPro  5.9<L>  /  Alb  2.8<L>  /  TBili  0.7  /  DBili  x   /  AST  13  /  ALT  17  /  AlkPhos  76  06-21    Creatinine Trend: 0.64<--, 0.78<--, 0.78<--, 0.78<--, 0.82<--    COAGS:           T(C): 36.6 (06-21-22 @ 11:32), Max: 36.7 (06-20-22 @ 19:58)  HR: 60 (06-21-22 @ 09:37) (56 - 61)  BP: 148/82 (06-21-22 @ 11:32) (142/81 - 184/86)  RR: 18 (06-21-22 @ 11:32) (18 - 18)  SpO2: 96% (06-21-22 @ 11:32) (94% - 97%)  Wt(kg): --    I&O's Summary      HEENT:  (-)icterus (-)pallor  CV: N S1 S2 1/6 REBEKAH (+)2 Pulses B/l  Resp:  Clear to ausculatation B/L, normal effort  GI: (+) BS Soft, NT, ND  Lymph:  trace edema, (-)obvious lymphadenopathy  Skin: Warm to touch, Normal turgor  Psych: Appropriate mood and affect            ASSESSMENT/PLAN: 	87y  Male  NIDDM, HTN, obesity, normal LV and R function on echo performed in our office 1 year ago, normal perfusion on a treadmill nuclear stress 12/21, non-vax,  p/w  2 days,  weakness, low PO covid (+) trace (+) trop of questionable clinical significance,     - clinical presentation is inconsistent with ACS.  Positive trop ? due to falling on Buttock  - DDimmer appropriate for AGE  - treatment of COVID per medical team  - No need for further inpatient cardiac work up expected at this time   - D/C plan per primary team  - outp alin f/u with Dr. Geeta Jack MD, [facility]  BEEPER [phone]

## 2022-06-21 NOTE — DIETITIAN INITIAL EVALUATION ADULT - PERTINENT LABORATORY DATA
06-21    142  |  108  |  15  ----------------------------<  120<H>  3.8   |  27  |  0.64    Ca    8.8      21 Jun 2022 06:16  Phos  3.1     06-21  Mg     2.0     06-21    TPro  5.9<L>  /  Alb  2.8<L>  /  TBili  0.7  /  DBili  x   /  AST  13  /  ALT  17  /  AlkPhos  76  06-21  POCT Blood Glucose.: 119 mg/dL (06-21-22 @ 07:41)  A1C with Estimated Average Glucose Result: 7.0 % (06-16-22 @ 10:31)

## 2022-06-21 NOTE — DIETITIAN INITIAL EVALUATION ADULT - LITERATURE/VIDEOS GIVEN
Provided Heart Healthy Consistent Carbohydrate, My Plate, Carbohydrate Counting for People with Diabetes Nutritional Handouts.

## 2022-06-21 NOTE — DIETITIAN INITIAL EVALUATION ADULT - ORAL INTAKE PTA/DIET HISTORY
Unable to visit Pt due to Pt being in isolation.  Spoke with Pt's Welsh Speaking friend via phone with Pt on cell phone. At times it was difficult to understand the friend due to a language barrier. Pt's friend reports Pt with poor appetite/po intake, consuming < 50% of meals. Pt lives at home and has a HHS who does the food shopping and cooking. Pt has NKFA.  Pt taking vitamin C, B12 and D supplements; denies any oral nutritional supplementation.  Unable to visit Pt due to Pt being in isolation.  Spoke with Pt's English Speaking friend via phone with Pt on cell phone. At times it was difficult to understand the friend due to language barrier. Pt's friend reports Pt with poor appetite/po intake, consuming < 50% of meals. Pt lives at home and has [facility] who does the food shopping and cooking. Pt has NKFA.  Pt taking vitamin C, B12 and D supplements; denies any oral nutritional supplementation.  Unable to visit Pt due to Pt being in isolation.  Spoke with Pt's Japanese/English Speaking friend/HCP POA via phone with Pt permission on cell phone. Pt's friend reports Pt with poor appetite/po intake, consuming < 50% of meals. Per H&P Pt with low po intake x2 days, PTA. Pt lives at home and has [facility] who does the food shopping and cooking. Pt has NKFA.  Pt taking vitamin C, B12 and D supplements; denies any oral nutritional supplementation.

## 2022-06-21 NOTE — DISCHARGE NOTE NURSING/CASE MANAGEMENT/SOCIAL WORK - PATIENT PORTAL LINK FT
You can access the FollowMyHealth Patient Portal offered by [facility] by registering at the following website: [email or web address]. By joining [facility]’s FollowMyHealth portal, you will also be able to view your health information using other applications (apps) compatible with our system.

## 2022-06-21 NOTE — DIETITIAN INITIAL EVALUATION ADULT - OTHER INFO
Pt reports po intake is poor, states he doesn't eat much. Recommended Glucerna oral nutritional supplementation. Pt's friends accepted.      Pt with T2DM, diagnosed ~ 23 years ago. Pt manages his blood glucose levels with Metformin and checks fingersticks twice day; readings are unstable.    Pt reports UBW 170lbs, date unknown. Dosing weight 145lbs, 6/15/22, daily weight 243.3lbs 6/19/22? questionable accuracy. Of note Pt with fluid accumulation.    Asked Pt's friend if she would like Nutrition Education for Pt. See accepted. Left with at nurse' station Heart Healthy Consistent Carbohydrate, My Plate, Carbohydrate Counting for People with Diabetes Nutritional Handouts.     Pt reports poor po intake, states he doesn't eat much. Recommended Glucerna oral nutritional supplementation. Pt's friends accepted.      Pt with T2DM, diagnosed ~ 23 years ago. A1c 7.0%. Pt states he manages his blood glucose levels with Metformin and check fingersticks twice day at home; readings are unstable.    Pt reports UBW 170lbs, date unknown. Dosing weight 145lbs, 6/15/22, daily weight 243.3lbs 6/19/22? questionable accuracy. Of note Pt with fluid accumulation.    Asked Pt's friend if she would like Nutrition Education for Pt. She accepted. Left at nurse's station Heart Healthy Consistent Carbohydrate, My Plate, Carbohydrate Counting for People with Diabetes Nutritional Handouts.

## 2022-06-21 NOTE — PROGRESS NOTE ADULT - PROVIDER SPECIALTY LIST ADULT
Cardiology
Hospitalist
Internal Medicine

## 2022-06-22 ENCOUNTER — TRANSCRIPTION ENCOUNTER (OUTPATIENT)
Age: 87
End: 2022-06-22

## 2022-07-28 NOTE — H&P ADULT - NSICDXPASTMEDICALHX_GEN_ALL_CORE_FT
Eat between 1200 and 1400 Calories per day to loose weight  Carbs limit to 30 to 45g per meal  Fats limit to 60g per day (no more than 20g of saturated fats)  Cholesterol limit to no more than 300mg per day  Sodium less than 2000mg per day    MyWealthsimplePal or similar application (or track by journal) to monitor calories and macronutrients. This is the most critical component to a heart healthy, balanced diet: honesty, keeping oneself accountable, ensure you are tracking daily goals and meeting them. Food is medicine! Counseled the patient on importance of cardiovascular and resistance training. Make every attempt to engage in a level of activity, sustained for at least 30 minutes, where you saturate your undergarments with sweat. This should be done, at a minimum, three times per week.     HR target 130bpm for 15 min straight 5 times per week at a minimum (ideally 110bpm to 130bpm 30 min straight 5 times per week)
PAST MEDICAL HISTORY:  Diabetes mellitus     HTN (hypertension)     Hypercholesterolemia

## 2022-11-07 NOTE — ED ADULT NURSE NOTE - CAS TRG GEN SKIN CONDITION
LESLIE Heard     Warm/Dry

## 2022-11-09 NOTE — ED ADULT TRIAGE NOTE - ESI TRIAGE ACUITY LEVEL, MLM
3 no sex nothing in vagina no heavy lifting no pushing eat high fiber food ambulation daily as tolerated shower daily clean wound well and keep dry after; see your gynecologist in 1-2wks for follow up

## 2022-12-26 NOTE — PATIENT PROFILE ADULT - DOES PATIENT HAVE ADVANCE DIRECTIVE
Goal Outcome Evaluation:                 Problem: Adult Inpatient Plan of Care  Goal: Absence of Hospital-Acquired Illness or Injury  Intervention: Identify and Manage Fall Risk  Recent Flowsheet Documentation  Taken 12/26/2022 0200 by Delvin Gray RN  Safety Promotion/Fall Prevention:   activity supervised   safety round/check completed   toileting scheduled  Taken 12/26/2022 0000 by Delvin Gray RN  Safety Promotion/Fall Prevention:   activity supervised   safety round/check completed   toileting scheduled  Taken 12/25/2022 2200 by Delvin Gray RN  Safety Promotion/Fall Prevention:   safety round/check completed   toileting scheduled   activity supervised  Taken 12/25/2022 2000 by Delvin Gray RN  Safety Promotion/Fall Prevention:   activity supervised   safety round/check completed   toileting scheduled  Intervention: Prevent Skin Injury  Recent Flowsheet Documentation  Taken 12/26/2022 0200 by Delvin Gray RN  Body Position: supine  Skin Protection: adhesive use limited  Taken 12/26/2022 0000 by Delvin Gray RN  Body Position: supine  Skin Protection: adhesive use limited  Taken 12/25/2022 2200 by Delvin Gray RN  Body Position: supine  Skin Protection: adhesive use limited  Taken 12/25/2022 2000 by Delvin Gray RN  Body Position: supine  Skin Protection: adhesive use limited  Intervention: Prevent and Manage VTE (Venous Thromboembolism) Risk  Recent Flowsheet Documentation  Taken 12/26/2022 0200 by Delvin Gray RN  VTE Prevention/Management: patient refused intervention  Taken 12/26/2022 0000 by Delvin Gray RN  VTE Prevention/Management: patient refused intervention  Taken 12/25/2022 2200 by Delvin Gray RN  VTE Prevention/Management: patient refused intervention  Taken 12/25/2022 2000 by Delvin Gray RN  VTE Prevention/Management: patient refused intervention  Intervention: Prevent Infection  Recent Flowsheet Documentation  Taken 12/26/2022 0000 by Delvin Gray  GEOVANNY, RN  Infection Prevention: environmental surveillance performed  Taken 12/25/2022 2000 by Delvin Gray RN  Infection Prevention: environmental surveillance performed  Goal: Optimal Comfort and Wellbeing  Intervention: Monitor Pain and Promote Comfort  Recent Flowsheet Documentation  Taken 12/26/2022 0200 by Delvin Gray RN  Pain Management Interventions: quiet environment facilitated  Taken 12/26/2022 0000 by Delvin Gray RN  Pain Management Interventions: quiet environment facilitated  Taken 12/25/2022 2200 by Delvin Gray RN  Pain Management Interventions: quiet environment facilitated  Taken 12/25/2022 2000 by Delvin Gray RN  Pain Management Interventions: quiet environment facilitated  Intervention: Provide Person-Centered Care  Recent Flowsheet Documentation  Taken 12/26/2022 0200 by Delvin Gray RN  Trust Relationship/Rapport: care explained  Taken 12/26/2022 0000 by Delvin Gray RN  Trust Relationship/Rapport: care explained  Taken 12/25/2022 2200 by Delvin rGay RN  Trust Relationship/Rapport: care explained  Taken 12/25/2022 2000 by Delvin Gray RN  Trust Relationship/Rapport: care explained     VSS, voids well, rested throughout the night, will continue to monitor for changes.    No

## 2023-01-30 NOTE — PATIENT PROFILE ADULT - NSASFALLNEEDSASSIST_GEN_A_NUR
Medication(s) Requested:   lisdexamfetamine (Vyvanse) 30 MG capsule 30 capsule 0 12/30/2022     Sig - Route: Take 1 capsule by mouth every morning. - Oral        Last office visit: 12/16/22 ADHD    Is the patient due for refill of this medication(s): Yes  PDMP review: Criteria met. Forwarded to Physician/ANGIE for signature.     
PDMP reviewed. Therapy appropriate.  Prescription signed.    
yes

## 2023-10-09 NOTE — ED ADULT NURSE NOTE - NS PRO PASSIVE SMOKE EXP
201 Signed- Rights explained, bed search explained- Faxed to [facility]- Original on patient chart.     Zuleyka Vila  Crisis Intervention Specialist II  10/09/23 Unknown

## 2023-12-28 NOTE — ED ADULT NURSE NOTE - CAS EDP DISCH DISPOSITION ADMI
.  ENDOSCOPIES colonoscopy- 3/2021- 1/3 ta, hyperplastic egd- biopsies negative for h pylori 3/2021  LABS   IMAGES ct 3/2023- no obstruction, peritoneal mets , left anterior cardiophrenic and right pericardial nodes, mildl enlarged portocaval node 507 A/Telemetry

## 2024-07-31 NOTE — PATIENT PROFILE ADULT - NSPROGENSOURCEINFO_GEN_A_NUR
Problem: Orthopaedic Fracture  Goal: Absence of Bleeding  Intervention: Monitor and Manage Fracture Bleeding  Recent Flowsheet Documentation  Taken 7/31/2024 0100 by Wanda Schmitz RN  Bleeding Management: dressing monitored     Problem: Orthopaedic Fracture  Goal: Optimal Functional Ability  Intervention: Optimize Functional Ability  Recent Flowsheet Documentation  Taken 7/31/2024 0100 by Wanda Schmitz, RN  Activity Management:   activity adjusted per tolerance   activity encouraged  Positioning/Transfer Devices:   pillows   in use     Alert, oriented only to self however able to communicate needs. VSS on 1L O2. Incontinence cares done. Denies pain overnight. Taking pills whole with applesauce. Dressing is CDI. Patient states she has baseline numbness in BLE, CMS intact otherwise.    Wife, Jaylene/patient/family/significant other

## 2024-12-27 NOTE — ED ADULT NURSE NOTE - EXTENSIONS OF SELF_ADULT
Medication Name: Lansoprazole    Paid claim by pharmacy, refill too soon. No PA needed at this time.         Medication Prior Authorization team will close this encounter     Cane

## 2025-01-16 NOTE — H&P ADULT - PROBLEM SELECTOR PLAN 4
Outcome: Successful outpatient ophthalmic surgical procedure  Preprinted Instructions given to patient.  Regular diet.  Activity: No restrictions  Meds: see Med Rec  Condition: stable  Follow up: 1 day with Dr Calvo  Disposition: Home  Diagnosis: s/p eye surgery  Date of discharge: 01/16/2025   Continue home medications   Dash diet

## 2025-05-05 NOTE — DISCHARGE NOTE NURSING/CASE MANAGEMENT/SOCIAL WORK - NSSCNAMETXT_GEN_ALL_CORE
is not in acute distress.     Appearance: Normal appearance.   HENT:      Head: Normocephalic and atraumatic.   Cardiovascular:      Rate and Rhythm: Normal rate and regular rhythm.   Pulmonary:      Effort: Pulmonary effort is normal.   Abdominal:      General: There is no distension.      Palpations: Abdomen is soft.      Tenderness: There is no abdominal tenderness.   Musculoskeletal:         General: Normal range of motion.   Skin:     General: Skin is warm and dry.   Neurological:      General: No focal deficit present.      Mental Status: She is alert and oriented to person, place, and time.   Psychiatric:         Mood and Affect: Mood normal.        Vascular: 2+ radial, 2+ dorsalis pedis, 2+ posterial tibial, and 2+ femoral    Vitals  Vitals:    25 1513   BP: 126/70   BP Site: Left Upper Arm   Patient Position: Sitting   BP Cuff Size: Medium Adult   Pulse: 89   SpO2: 96%   Weight: 74.4 kg (164 lb)   Height: 1.6 m (5' 2.99\")       Imaging  Vascular US Duplex Lower Extremity Venous Bilateral    Patient Name: Daniela Pemberton   Patient MRN: [id number]   Patient : 1959 (65 y.o.)   Gender Identity: Female   Height: 1.6 m (5' 2.99\")   Weight: 89.4 kg (197 lb 3.2 oz)   BSA: 1.99 m²   Blood Pressure: Not recorded    Accession Number: [id number]   Date of Study: 2024 ( 4:20 PM)   Ordering Provider: Kp Bazan MD   Clinical Indications: Bilateral leg swelling, assess for acute or chronic DVT       Reading Physicians  Performing Staff   Vascular: Kp Bazan MD    Tech/Nurse: PRIMO, SCOOBY, RVT         Vascular History    Vascular duplex lower extremity venous bilateral (Order #[id number]) on 24     Reason for Exam  Priority: Routine  Bilateral leg swelling, assess for acute or chronic DVT   Dx: Leg swelling [M79.89 (ICD-10-CM)]; Venous insufficiency [I87.2 (ICD-10-CM)]     PACS Images     Show images for Vascular duplex lower extremity venous bilateral  Interpretation Summary  Show Result 
[facility]. [phone]